# Patient Record
Sex: MALE | Race: WHITE | NOT HISPANIC OR LATINO | Employment: FULL TIME | URBAN - METROPOLITAN AREA
[De-identification: names, ages, dates, MRNs, and addresses within clinical notes are randomized per-mention and may not be internally consistent; named-entity substitution may affect disease eponyms.]

---

## 2017-07-18 ENCOUNTER — LAB VISIT (OUTPATIENT)
Dept: LAB | Facility: HOSPITAL | Age: 67
End: 2017-07-18
Attending: INTERNAL MEDICINE
Payer: COMMERCIAL

## 2017-07-18 DIAGNOSIS — I10 HYPERTENSION: Primary | ICD-10-CM

## 2017-07-18 LAB — POTASSIUM SERPL-SCNC: 3.1 MMOL/L

## 2017-07-18 PROCEDURE — 84132 ASSAY OF SERUM POTASSIUM: CPT

## 2017-07-18 PROCEDURE — 36415 COLL VENOUS BLD VENIPUNCTURE: CPT

## 2017-11-28 ENCOUNTER — LAB VISIT (OUTPATIENT)
Dept: LAB | Facility: HOSPITAL | Age: 67
End: 2017-11-28
Attending: INTERNAL MEDICINE
Payer: MEDICARE

## 2017-11-28 DIAGNOSIS — F52.21 ERECTILE DYSFUNCTION OF NONORGANIC ORIGIN: ICD-10-CM

## 2017-11-28 DIAGNOSIS — I25.118 ATHSCL HEART DISEASE OF NATIVE COR ART W OTH ANG PCTRS: ICD-10-CM

## 2017-11-28 DIAGNOSIS — I10 HYPERTENSION: Primary | ICD-10-CM

## 2017-11-28 LAB
ALBUMIN SERPL BCP-MCNC: 4 G/DL
ALP SERPL-CCNC: 83 U/L
ALT SERPL W/O P-5'-P-CCNC: 36 U/L
ANION GAP SERPL CALC-SCNC: 12 MMOL/L
AST SERPL-CCNC: 21 U/L
BILIRUB SERPL-MCNC: 0.6 MG/DL
BUN SERPL-MCNC: 17 MG/DL
CALCIUM SERPL-MCNC: 9.3 MG/DL
CHLORIDE SERPL-SCNC: 102 MMOL/L
CO2 SERPL-SCNC: 30 MMOL/L
CREAT SERPL-MCNC: 0.8 MG/DL
EST. GFR  (AFRICAN AMERICAN): >60 ML/MIN/1.73 M^2
EST. GFR  (NON AFRICAN AMERICAN): >60 ML/MIN/1.73 M^2
GLUCOSE SERPL-MCNC: 83 MG/DL
POTASSIUM SERPL-SCNC: 3 MMOL/L
PROT SERPL-MCNC: 7.1 G/DL
SODIUM SERPL-SCNC: 144 MMOL/L

## 2017-11-28 PROCEDURE — 36415 COLL VENOUS BLD VENIPUNCTURE: CPT

## 2017-11-28 PROCEDURE — 80053 COMPREHEN METABOLIC PANEL: CPT

## 2017-11-28 PROCEDURE — 84402 ASSAY OF FREE TESTOSTERONE: CPT

## 2017-11-29 ENCOUNTER — TELEPHONE (OUTPATIENT)
Dept: UROLOGY | Facility: CLINIC | Age: 67
End: 2017-11-29

## 2017-11-29 NOTE — TELEPHONE ENCOUNTER
----- Message from Lizzette Carmona sent at 11/29/2017 11:39 AM CST -----  Contact: pt  Pt states that he got a letter to come in 12/14 for his annual appointment also having kidney issues,only thing coming up in the system is 12/29.Pt would like to be seen sooner than that...[phone] (home)

## 2017-11-30 ENCOUNTER — OFFICE VISIT (OUTPATIENT)
Dept: UROLOGY | Facility: CLINIC | Age: 67
End: 2017-11-30
Payer: MEDICARE

## 2017-11-30 ENCOUNTER — HOSPITAL ENCOUNTER (OUTPATIENT)
Dept: RADIOLOGY | Facility: HOSPITAL | Age: 67
Discharge: HOME | End: 2017-11-30
Attending: INTERNAL MEDICINE
Payer: MEDICARE

## 2017-11-30 VITALS
WEIGHT: 251.13 LBS | RESPIRATION RATE: 18 BRPM | SYSTOLIC BLOOD PRESSURE: 151 MMHG | DIASTOLIC BLOOD PRESSURE: 87 MMHG | HEART RATE: 73 BPM | HEIGHT: 70 IN | BODY MASS INDEX: 35.95 KG/M2

## 2017-11-30 DIAGNOSIS — N40.0 BENIGN PROSTATIC HYPERPLASIA, UNSPECIFIED WHETHER LOWER URINARY TRACT SYMPTOMS PRESENT: ICD-10-CM

## 2017-11-30 DIAGNOSIS — Z87.442 PERSONAL HISTORY OF KIDNEY STONES: ICD-10-CM

## 2017-11-30 DIAGNOSIS — R10.9 ACUTE LEFT FLANK PAIN: Primary | ICD-10-CM

## 2017-11-30 DIAGNOSIS — R10.10 UPPER ABDOMINAL PAIN: ICD-10-CM

## 2017-11-30 DIAGNOSIS — R10.10 UPPER ABDOMINAL PAIN: Primary | ICD-10-CM

## 2017-11-30 LAB
BILIRUB SERPL-MCNC: NORMAL MG/DL
BLOOD URINE, POC: NORMAL
COLOR, POC UA: NORMAL
GLUCOSE UR QL STRIP: NORMAL
KETONES UR QL STRIP: NORMAL
LEUKOCYTE ESTERASE URINE, POC: NORMAL
NITRITE, POC UA: NORMAL
PH, POC UA: 5
POC RESIDUAL URINE VOLUME: 37 ML (ref 0–100)
PROTEIN, POC: NORMAL
SPECIFIC GRAVITY, POC UA: 1
UROBILINOGEN, POC UA: NORMAL

## 2017-11-30 PROCEDURE — 81001 URINALYSIS AUTO W/SCOPE: CPT | Mod: S$GLB,,, | Performed by: NURSE PRACTITIONER

## 2017-11-30 PROCEDURE — 51798 US URINE CAPACITY MEASURE: CPT | Mod: S$GLB,,, | Performed by: NURSE PRACTITIONER

## 2017-11-30 PROCEDURE — 74000 XR KUB: CPT | Mod: TC

## 2017-11-30 PROCEDURE — 99214 OFFICE O/P EST MOD 30 MIN: CPT | Mod: 25,S$GLB,, | Performed by: NURSE PRACTITIONER

## 2017-11-30 PROCEDURE — 99999 PR PBB SHADOW E&M-EST. PATIENT-LVL IV: CPT | Mod: PBBFAC,,, | Performed by: NURSE PRACTITIONER

## 2017-11-30 PROCEDURE — 74000 XR KUB: CPT | Mod: 26,,, | Performed by: RADIOLOGY

## 2017-11-30 NOTE — PROGRESS NOTES
[facility] Note  Staff: SU Campos    Referring provider and please cc:   PCP: Dr. Phill Balderrama    Chief Complaint: Annual exam, left flank pain    Subjective:        HPI: Sammy Muniz is a 67 y.o. male presents today for annual exam of prostate and recent onset of acute left flank pain which began three days ago.    The patient was last seen by Dr. Veronica on 12/15/16 for routine check in regards to history of BPH.  Pt underwent a laser vaporization of the prostate years ago, and in general he is doing well.  He   refers that he is urinating with a good flow and good control, have nocturia x2-x3.  Denies dysuria or hematuria.  He feels that he empties the bladder satisfactory with no hesitation or terminal dribbling, but his postvoid residual urine was measured today and was found to be at 37 mL.    The pt saw Dr. Phill Balderrama this am which ordered him a KUB and the results showed the following:  Findings: A stone is seen overlying the lower pole of the left kidney measuring 4 mm. No additional stones are seen overlying the renal shadows or along the expected course of ureters. Previously present stone in the midpole of the left kidney is not seen on this exam.  IMPRESSION:    Left nephrolithiasis.    Last PSA Screening:   Lab Results   Component Value Date    PSA 0.32 01/08/2013    PSADIAG 0.54 12/07/2016    PSADIAG 0.59 08/31/2015    PSADIAG 0.40 07/22/2014     REVIEW OF SYSTEMS:  Review of Systems   Constitutional: Negative for chills, diaphoresis, fever and weight loss.   HENT: Negative for congestion, hearing loss, nosebleeds and sore throat.    Eyes: Negative for blurred vision and pain.   Respiratory: Negative for cough and wheezing.    Cardiovascular: Negative for chest pain, palpitations and leg swelling.   Gastrointestinal: Negative for abdominal pain, heartburn, nausea and vomiting.   Genitourinary: Positive for flank pain (Left). Negative for dysuria,  frequency, hematuria and urgency.   Musculoskeletal: Negative for back pain, joint pain, myalgias and neck pain.   Skin: Negative for itching and rash.   Neurological: Negative for dizziness, tremors, sensory change, seizures, loss of consciousness, weakness and headaches.   Endo/Heme/Allergies: Does not bruise/bleed easily.   Psychiatric/Behavioral: Negative for depression and suicidal ideas. The patient is not nervous/anxious.      Physical Exam    PMHx:  Past Medical History:   Diagnosis Date    Arthritis     left knee; right shoulder    Bladder tumor     BPH (benign prostatic hyperplasia)     Hypertension     Sleep apnea     Doews not use C-pap     Kidney stones: Yes     PSHx:  Past Surgical History:   Procedure Laterality Date    CERVICAL FUSION      CYSTOSCOPY      GASTRIC BYPASS  2012    HERNIA REPAIR      ventral hernia    JOINT REPLACEMENT      Bilateral knees & Rt shoulder    PROSTATE SURGERY      greenlight laser    TONSILLECTOMY      TOTAL KNEE ARTHROPLASTY  left    TOTAL SHOULDER ARTHROPLASTY  right    TYMPANOPLASTY  left    VASECTOMY       Allergies:  Iodinated contrast- oral and iv dye    Medications: reviewed   Anticoagulation: No    Objective:     Vitals:    11/30/17 1339   BP: (!) 151/87   Pulse: 73   Resp: 18     General:WDWN in NAD  Eyes: PERRLA, normal conjunctiva  Respiratory: no increased work on breathing, clear to auscultation  Cardiovascular: regular rate and rhythm. No obvious extremity edema.  GI: palpation of masses. No tenderness. No hepatosplenomegaly to palpation.  Musculoskeletal: normal range of motion of bilateral upper extremities. Normal muscle strength and tone.  Skin: no obvious rashes or lesions. No tightening of skin noted.  Neurologic: CN grossly normal. Normal sensation.   Psychiatric: awake, alert and oriented x 3. Mood and affect normal. Cooperative.    :  Inspection of anus and perineum normal  No scrotal rashes, cysts or lesions  Epididymis normal in  size, no tenderness  Testes normal and size, equal size bilaterally, no masses, movable palpable pinpoint cysts felt.  Urethral meatus normal without discharge  Penis is circumcised,    ANDREW: Normal prostate gland without masses, tenderness. SV not palpable. Normal sphincter tone.   No bilateral inguinal hernias noted     LABS REVIEW:  UA today:  Color, UA  yellow,clear    Spec Grav UA 1.005    pH, UA 5.0    WBC, UA neg    Nitrite, UA neg    Protein trace    Glucose, UA norm    Ketones, UA neg\    Urobilinogen, UA neg    Bilirubin neg    Blood, UA neg      Assessment:       1. Acute left flank pain    2. Benign prostatic hyperplasia, unspecified whether lower urinary tract symptoms present    3. Personal history of kidney stones          Plan:     Left flank pain-due to pt's pain we will order CT RSS for further evaluation and consult Dr. Veronica if necessary for treatment if stone.    Draw PSA level today.  Continue Flomax as previously prescribed.    F/up:TBD after imaging results are reviewed.    Gita Lehman, BEVERLYP-C

## 2017-12-01 ENCOUNTER — HOSPITAL ENCOUNTER (OUTPATIENT)
Dept: RADIOLOGY | Facility: HOSPITAL | Age: 67
Discharge: HOME | End: 2017-12-01
Attending: NURSE PRACTITIONER
Payer: MEDICARE

## 2017-12-01 DIAGNOSIS — R10.9 ACUTE LEFT FLANK PAIN: ICD-10-CM

## 2017-12-01 DIAGNOSIS — N40.0 BENIGN PROSTATIC HYPERPLASIA, UNSPECIFIED WHETHER LOWER URINARY TRACT SYMPTOMS PRESENT: ICD-10-CM

## 2017-12-01 LAB — TESTOST FREE SERPL-MCNC: 5.5 PG/ML

## 2017-12-01 PROCEDURE — 74176 CT ABD & PELVIS W/O CONTRAST: CPT | Mod: TC

## 2017-12-01 PROCEDURE — 74176 CT ABD & PELVIS W/O CONTRAST: CPT | Mod: 26,,, | Performed by: RADIOLOGY

## 2018-01-17 ENCOUNTER — PATIENT MESSAGE (OUTPATIENT)
Dept: UROLOGY | Facility: CLINIC | Age: 68
End: 2018-01-17

## 2018-01-18 ENCOUNTER — HOSPITAL ENCOUNTER (OUTPATIENT)
Dept: RADIOLOGY | Facility: HOSPITAL | Age: 68
Discharge: HOME | End: 2018-01-18
Attending: NURSE PRACTITIONER
Payer: MEDICARE

## 2018-01-18 ENCOUNTER — OFFICE VISIT (OUTPATIENT)
Dept: UROLOGY | Facility: CLINIC | Age: 68
End: 2018-01-18
Payer: MEDICARE

## 2018-01-18 ENCOUNTER — DOCUMENTATION ONLY (OUTPATIENT)
Dept: UROLOGY | Facility: CLINIC | Age: 68
End: 2018-01-18

## 2018-01-18 VITALS
HEIGHT: 70 IN | HEART RATE: 77 BPM | SYSTOLIC BLOOD PRESSURE: 153 MMHG | WEIGHT: 251.13 LBS | DIASTOLIC BLOOD PRESSURE: 86 MMHG | RESPIRATION RATE: 18 BRPM | BODY MASS INDEX: 35.95 KG/M2

## 2018-01-18 DIAGNOSIS — R10.9 FLANK PAIN: ICD-10-CM

## 2018-01-18 DIAGNOSIS — N20.0 NEPHROLITHIASIS: ICD-10-CM

## 2018-01-18 DIAGNOSIS — R10.9 FLANK PAIN: Primary | ICD-10-CM

## 2018-01-18 LAB
BILIRUB SERPL-MCNC: ABNORMAL MG/DL
BLOOD URINE, POC: ABNORMAL
COLOR, POC UA: CLEAR
GLUCOSE UR QL STRIP: ABNORMAL
KETONES UR QL STRIP: ABNORMAL
LEUKOCYTE ESTERASE URINE, POC: ABNORMAL
NITRITE, POC UA: ABNORMAL
PH, POC UA: 5
PROTEIN, POC: 30
SPECIFIC GRAVITY, POC UA: 1.01
UROBILINOGEN, POC UA: ABNORMAL

## 2018-01-18 PROCEDURE — 99214 OFFICE O/P EST MOD 30 MIN: CPT | Mod: 25,S$GLB,, | Performed by: NURSE PRACTITIONER

## 2018-01-18 PROCEDURE — 81001 URINALYSIS AUTO W/SCOPE: CPT | Mod: S$GLB,,, | Performed by: NURSE PRACTITIONER

## 2018-01-18 PROCEDURE — 74018 RADEX ABDOMEN 1 VIEW: CPT | Mod: 26,,, | Performed by: RADIOLOGY

## 2018-01-18 PROCEDURE — 74018 RADEX ABDOMEN 1 VIEW: CPT | Mod: TC,FY

## 2018-01-18 PROCEDURE — 76770 US EXAM ABDO BACK WALL COMP: CPT | Mod: 26,,, | Performed by: RADIOLOGY

## 2018-01-18 PROCEDURE — 99999 PR PBB SHADOW E&M-EST. PATIENT-LVL V: CPT | Mod: PBBFAC,,, | Performed by: NURSE PRACTITIONER

## 2018-01-18 PROCEDURE — 76770 US EXAM ABDO BACK WALL COMP: CPT | Mod: TC

## 2018-01-18 RX ORDER — SULFAMETHOXAZOLE AND TRIMETHOPRIM 800; 160 MG/1; MG/1
1 TABLET ORAL 2 TIMES DAILY
Qty: 42 TABLET | Refills: 0 | Status: SHIPPED | OUTPATIENT
Start: 2018-01-18 | End: 2018-02-08

## 2018-01-18 RX ORDER — OXYCODONE AND ACETAMINOPHEN 5; 325 MG/1; MG/1
1 TABLET ORAL
Qty: 30 TABLET | Refills: 0 | Status: SHIPPED | OUTPATIENT
Start: 2018-01-18 | End: 2018-01-28

## 2018-01-18 NOTE — PROGRESS NOTES
I HAVE PERSONALLY CALLED THIS PATIENT MYSELF THIS EVENING AND REVIEWED RESULTS OF KUB XR AND RENAL US WITH HIM TODAY.  FROM A UROLOGICAL STANDPOINT I EXPLAINED TO PT THAT I AM UNSURE AT THIS TIME AFTER SEEING RESULTS IF HIS CURRENT PAIN IS UROLOGY RELATED.    AT THIS TIME, I AM SENDING IN A PRESCRIPTION FOR BACTRIM -160 1 P.O. BID X 3 WEEKS TO BEGIN TONIGHT TO RULE OUT ANY FORM OF PROSTATE/TESTICLE INFECTION RELATED TO SYMPTOMS OF PAIN GOING DOWN INTO LEFT TESTICLE AREA.  IF PATIENT SYMPTOMS ARE UNCHANGED AFTER ANTIBIOTICS I WILL SCHEDULE HIM TO SEE DR. HERRERA FOR OPINION AND MAY ORDER CT UROGRAM.  I ALSO EXPLAINED TO PT TODAY TO GO AHEAD AND CONSULT WITH GENERAL SURGEON FOR FURTHER EVALUATION OF UMBILICAL HERNIA  WHICH CONTAINS BOWEL FROM PREVIOUS CT SCAN.  PT VERBALIZED UNDERSTANDING.      WE WILL FOLLOW-UP ACCORDINGLY.    (IF NEEDED IN FUTURE, PT DID STATE ALLERGY CONTRAST IN PAST CONSISTED OF MILD ITCHING AFTER PROCEDURE)

## 2018-01-18 NOTE — PROGRESS NOTES
[facility] Progress Note  Staff: SU Campos    Chief Complaint:   Chief Complaint   Patient presents with    Follow-up    Flank Pain    Hx of kidney stones      HPI: Sammy Muniz is a 67 y.o. male here with complaints of left sided flank pain which he describes as intermittently sharp and sometimes radiates around to lower left stomach region and into left testicle.  This pain began one week ago and he is here today for further evaluation of symptoms.  Pt states today the pain varies--today it is localized in left back area.    Pt denies dysuria, hematuria, or problems with urinary habits.  No recent fever, nausea or vomiting.    The patient was last seen by me on 11/30/2017 for his annual exam with hx of BPH and kidney stones.  At last visit he was c/o left flank pain and CT RSS was performed:    CT Renal Stone Study:  IMPRESSION:  1. Nonobstructive left nephrolithiasis.  2. Hepatic steatosis.  3. Status post gastric sleeve.  4. Umbilical hernia containing a loop of small bowel without associated complication.    PSA tests:  12/01/2017:  0.37  12/07/2016:  0.54  08/31/2015   0.54    Relevant Labs:   UA today:    Color:  Clear, Yellow  Spec. Grav. 1.015  PH  5.0  +Protein    Review of Systems   Constitutional: Negative for chills, diaphoresis, fever and weight loss.   HENT: Negative for congestion, hearing loss, nosebleeds and sore throat.    Eyes: Negative for blurred vision and pain.   Respiratory: Negative for cough and wheezing.    Cardiovascular: Negative for chest pain, palpitations and leg swelling.   Gastrointestinal: Positive for abdominal pain. Negative for heartburn, nausea and vomiting.        Hx of gastric sleeve.  +Umbilical hernia containing loop of small bowel   Genitourinary: Positive for flank pain (Left sided flank pain radiating to left abdominal area.). Negative for dysuria, frequency, hematuria and urgency.   Musculoskeletal: Negative for back pain,  joint pain, myalgias and neck pain.   Skin: Negative for itching and rash.   Neurological: Negative for dizziness, tremors, sensory change, seizures, loss of consciousness, weakness and headaches.   Endo/Heme/Allergies: Does not bruise/bleed easily.   Psychiatric/Behavioral: Negative for depression and suicidal ideas. The patient is not nervous/anxious.      Physical Exam   Vitals reviewed.  Constitutional: He is oriented to person, place, and time. He appears well-developed and well-nourished.   HENT:   Head: Normocephalic and atraumatic.   Right Ear: External ear normal.   Left Ear: External ear normal.   Nose: Nose normal.   Mouth/Throat: Oropharynx is clear and moist.   Eyes: Conjunctivae and EOM are normal. Pupils are equal, round, and reactive to light.   Neck: Normal range of motion. Neck supple.   Cardiovascular: Normal rate, regular rhythm, normal heart sounds and intact distal pulses.    Pulmonary/Chest: Effort normal and breath sounds normal.   Abdominal: Soft. Bowel sounds are normal.   No CVA tenderness on palpation on exam.   Musculoskeletal: Normal range of motion.   Neurological: He is alert and oriented to person, place, and time. He has normal reflexes.   Skin: Skin is warm and dry.     Psychiatric: He has a normal mood and affect. His behavior is normal. Judgment and thought content normal.     A) Sammy Muniz is a 67 y.o. male with   1. Flank pain    2. Nephrolithiasis      Plan)   1. KUB and Renal US today.  2. In the meantime, I will prescribe pt Percocet 5-325 mg prn pain.    F/up after reviewing imaging results.    I have spent 30 minutes with this patient and over 50% of visit was spent counseling with the patient.    Gita BLUE

## 2018-01-18 NOTE — PATIENT INSTRUCTIONS
Uncertain Causes of Abdominal Pain (Male)  Based on your visit today, the exact cause of your abdominal pain is not clear. Your exam and tests do not suggest a dangerous cause at this time. However, the signs of a serious problem may take more time to appear. Although your evaluation was reassuring today, sometimes early in the course of many conditions, exam and lab tests can appear normal. Therefore, it is important for you to watch for any new symptoms or worsening of your condition.  It may not be obvious what caused your symptoms. Pay attention to things that do seem to make your symptoms worse or better and discuss this with your doctor when you follow up.  The evaluation of abdominal pain in the emergency department may only require an exam by the doctor or it may include blood, urine or imaging studies, depending on many factors. Sometimes exams and tests can identify a cause but in many cases, a clear cause is not found. Further testing at follow up visits may help to suggest a clear diagnosis.  Home care  · Rest as much as you can until your next exam.  · Try to avoid any medicines (unless otherwise directed by your doctor), foods, activities, or other factors that may have contributed to your symptoms.  · Try to eat foods that you know that you have tolerated well in the past. Certain diets may be recommended for some conditions that cause abdominal pain. However, since the cause of your symptoms may not be clear, discuss your diet more with your healthcare provider or specialist for further recommendations.   · If you have diarrhea, it may help to avoid dairy (lactose) for the time being. A low fat, low fiber diet can also help.  · Eating several small meals per day as opposed to 2 or 3 larger meals may help.  · Avoid dehydration. Make sure to drink plenty of water. Other options include broth, soup, gelatin, sports drinks, or other clear liquids.  · Watch closely for anything that may make your  symptoms worse or better. Pay close attention to symptoms below that may mean your condition is getting worse.  Follow-up care  Follow up with your healthcare provider if your symptoms are not improving, or as advised. In some cases, you may need more testing.  When to seek medical advice  Call your healthcare provider right away if any of these occur:  · Pain is becoming worse  · You are unable to take your medicines or can't keep water down due to excessive vomiting  · Swelling of the abdomen  · Fever of 100.4ºF (38ºC) or higher, or as directed by your healthcare provider  · Blood in vomit or bowel movements (dark red or black color)  · Jaundice (yellow color of eyes and skin)  · New onset of weakness, dizziness or fainting  · New onset of chest, arm, back, neck or jaw pain  Date Last Reviewed: 12/30/2015  © 0390-5993 [employer]. [address] [place] [address]. All rights reserved. This information is not intended as a substitute for professional medical care. Always follow your healthcare professional's instructions.        Flank Pain, Uncertain Cause  The flank is the area between your upper abdomen and your back. Pain there is often caused by a problem with your kidneys. It might be a kidney infection or a kidney stone. Other causes of flank pain include spinal arthritis, a pinched nerve from a back injury, or a back muscle strain or spasm.  The cause of your flank pain is not certain. You may need other tests.  Home care  Follow these tips when caring for yourself at home:  · You may use acetaminophen or ibuprofen to control pain, unless your health care provider prescribed another medicine. If you have chronic liver or kidney disease, talk with your provider before taking these medicines. Also talk with your provider first if youve ever had a stomach ulcer or GI bleeding.  · If the pain is coming from your muscles, you may get relief with ice or heat. During the first 2 days after  the injury, put an ice pack on the painful area for 20 minutes every 2 to 4 hours. This will reduce swelling and pain. A hot shower, hot bath, or heating pad works well for a muscle spasm. You can start with ice, then switch to heat after 2 days. You might find that alternating ice and heat works well. Use the method that feels the best to you.  Follow-up care  Follow up with your healthcare provider if your symptoms dont get better over the next few days.  When to seek medical advice  Call your healthcare provider right away if any of these happen:  · Repeated vomiting  · Fever of 100.4ºF (38ºC) or higher, or as directed by your health care provider  · Flank pain that gets worse  · Pain that spreads to the front of your belly (abdomen)  · Dizziness, weakness, or fainting  · Blood in your urine  · Burning feeling when you urinate or the need to urinate often  · Pain in one of your legs that gets worse  · Numbness or weakness in a leg  Date Last Reviewed: 10/1/2016  © 9676-3510 [employer]. [address] [place] [address], [place] [address]. All rights reserved. This information is not intended as a substitute for professional medical care. Always follow your healthcare professional's instructions.

## 2018-01-20 ENCOUNTER — HOSPITAL ENCOUNTER (EMERGENCY)
Facility: HOSPITAL | Age: 68
Discharge: HOME | End: 2018-01-20
Attending: EMERGENCY MEDICINE
Payer: MEDICARE

## 2018-01-20 VITALS
RESPIRATION RATE: 16 BRPM | TEMPERATURE: 98 F | SYSTOLIC BLOOD PRESSURE: 136 MMHG | DIASTOLIC BLOOD PRESSURE: 71 MMHG | BODY MASS INDEX: 36.01 KG/M2 | OXYGEN SATURATION: 98 % | HEART RATE: 77 BPM | WEIGHT: 251 LBS

## 2018-01-20 DIAGNOSIS — R10.9 RIGHT FLANK PAIN: Primary | ICD-10-CM

## 2018-01-20 LAB
ALBUMIN SERPL BCP-MCNC: 4.1 G/DL
ALP SERPL-CCNC: 81 U/L
ALT SERPL W/O P-5'-P-CCNC: 32 U/L
ANION GAP SERPL CALC-SCNC: 8 MMOL/L
AST SERPL-CCNC: 21 U/L
BACTERIA #/AREA URNS HPF: ABNORMAL /HPF
BASOPHILS # BLD AUTO: 0 K/UL
BASOPHILS NFR BLD: 0.5 %
BILIRUB SERPL-MCNC: 0.5 MG/DL
BILIRUB UR QL STRIP: ABNORMAL
BUN SERPL-MCNC: 12 MG/DL
CALCIUM SERPL-MCNC: 9.4 MG/DL
CHLORIDE SERPL-SCNC: 105 MMOL/L
CLARITY UR: CLEAR
CO2 SERPL-SCNC: 26 MMOL/L
COLOR UR: YELLOW
CREAT SERPL-MCNC: 0.8 MG/DL
DIFFERENTIAL METHOD: ABNORMAL
EOSINOPHIL # BLD AUTO: 0.1 K/UL
EOSINOPHIL NFR BLD: 1.5 %
ERYTHROCYTE [DISTWIDTH] IN BLOOD BY AUTOMATED COUNT: 12.9 %
EST. GFR  (AFRICAN AMERICAN): >60 ML/MIN/1.73 M^2
EST. GFR  (NON AFRICAN AMERICAN): >60 ML/MIN/1.73 M^2
GLUCOSE SERPL-MCNC: 143 MG/DL
GLUCOSE UR QL STRIP: NEGATIVE
HCT VFR BLD AUTO: 40.9 %
HGB BLD-MCNC: 14.3 G/DL
HGB UR QL STRIP: NEGATIVE
HYALINE CASTS #/AREA URNS LPF: 0 /LPF
KETONES UR QL STRIP: ABNORMAL
LEUKOCYTE ESTERASE UR QL STRIP: NEGATIVE
LYMPHOCYTES # BLD AUTO: 1.4 K/UL
LYMPHOCYTES NFR BLD: 24.4 %
MCH RBC QN AUTO: 31.3 PG
MCHC RBC AUTO-ENTMCNC: 34.9 G/DL
MCV RBC AUTO: 90 FL
MICROSCOPIC COMMENT: ABNORMAL
MONOCYTES # BLD AUTO: 0.3 K/UL
MONOCYTES NFR BLD: 4.7 %
NEUTROPHILS # BLD AUTO: 3.8 K/UL
NEUTROPHILS NFR BLD: 68.9 %
NITRITE UR QL STRIP: NEGATIVE
PH UR STRIP: 7 [PH] (ref 5–8)
PLATELET # BLD AUTO: 253 K/UL
PMV BLD AUTO: 7.4 FL
POTASSIUM SERPL-SCNC: 3.4 MMOL/L
PROT SERPL-MCNC: 7.1 G/DL
PROT UR QL STRIP: ABNORMAL
RBC # BLD AUTO: 4.56 M/UL
RBC #/AREA URNS HPF: 2 /HPF (ref 0–4)
SODIUM SERPL-SCNC: 139 MMOL/L
SP GR UR STRIP: 1.02 (ref 1–1.03)
URN SPEC COLLECT METH UR: ABNORMAL
UROBILINOGEN UR STRIP-ACNC: 1 EU/DL
WBC # BLD AUTO: 5.5 K/UL
WBC #/AREA URNS HPF: 3 /HPF (ref 0–5)

## 2018-01-20 PROCEDURE — 85025 COMPLETE CBC W/AUTO DIFF WBC: CPT

## 2018-01-20 PROCEDURE — 99284 EMERGENCY DEPT VISIT MOD MDM: CPT

## 2018-01-20 PROCEDURE — 80053 COMPREHEN METABOLIC PANEL: CPT

## 2018-01-20 PROCEDURE — 36415 COLL VENOUS BLD VENIPUNCTURE: CPT

## 2018-01-20 PROCEDURE — 81000 URINALYSIS NONAUTO W/SCOPE: CPT

## 2018-01-20 RX ORDER — ONDANSETRON 2 MG/ML
INJECTION INTRAMUSCULAR; INTRAVENOUS
Status: DISCONTINUED
Start: 2018-01-20 | End: 2018-01-20 | Stop reason: WASHOUT

## 2018-01-20 NOTE — ED PROVIDER NOTES
Encounter Date: 1/20/2018    SCRIBE #1 NOTE: I, Anthony Lopez, am scribing for, and in the presence of, Dr. Leong.       History     Chief Complaint   Patient presents with    Flank Pain     left. for almost two weeks. has been seeing Jeremías's NP. states labs, US and xrays. reports taking antibiotics for infection. pt thinks he was told he has a mass or cyst on kidney       01/20/2018 11:24 AM     Chief complaint:       Sammy Muniz is a 67 y.o. male with a history of HTN, Dm, CAD, and COPD who presents to the ED with an onset of left flank pain. The symptoms began about 2 weeks ago and have worsened. The patient states that the pain has begun to radiate down his left side. He adds that he has a history of kidney stones. An ultrasound last week showed that he has a cyst on top of his left kidney but no stone. He is currently taking Flomax.The patient denies any fever, coughing, or usage of Toradol to assist with the passing of a potential stone. No pertinent Shx noted.  No testicle pain or swelling.  No abdominal pain at this time.  No fevers or chills or vomiting.        The history is provided by the patient.     Review of patient's allergies indicates:   Allergen Reactions    Iodinated contrast- oral and iv dye Itching     Past Medical History:   Diagnosis Date    Arthritis     left knee; right shoulder    Bladder tumor     BPH (benign prostatic hyperplasia)     Hypertension     Sleep apnea     Doews not use C-pap     Past Surgical History:   Procedure Laterality Date    CERVICAL FUSION      CYSTOSCOPY      GASTRIC BYPASS  2012    HERNIA REPAIR      ventral hernia    JOINT REPLACEMENT      Bilateral knees & Rt shoulder    PROSTATE SURGERY      greenlight laser    TONSILLECTOMY      TOTAL KNEE ARTHROPLASTY  left    TOTAL SHOULDER ARTHROPLASTY  right    TYMPANOPLASTY  left    VASECTOMY       Family History   Problem Relation Age of Onset    Benign prostatic hyperplasia Brother     Heart  disease Mother     Kidney disease Mother     Arthritis Mother     Mental illness Father 84     dementia    Diabetes Father     Hypertension Father     Heart disease Father     Cancer Father 75     colon    Prostate cancer Neg Hx     Urolithiasis Neg Hx     Kidney cancer Neg Hx      Social History   Substance Use Topics    Smoking status: Never Smoker    Smokeless tobacco: Never Used    Alcohol use No     Review of Systems   Constitutional: Negative for fever.   Respiratory: Negative for cough and wheezing.    Cardiovascular: Negative for chest pain.   Gastrointestinal: Negative for abdominal pain.   Genitourinary: Positive for flank pain (left flank pain). Negative for dysuria.   Musculoskeletal: Negative for joint swelling.   Skin: Negative for rash.   All other systems reviewed and are negative.      Physical Exam     Initial Vitals [01/20/18 1102]   BP Pulse Resp Temp SpO2   (!) 146/93 77 16 97.6 °F (36.4 °C) 98 %      MAP       110.67         Physical Exam    Nursing note and vitals reviewed.  Constitutional: He appears well-developed and well-nourished. He is not diaphoretic.  Non-toxic appearance. He does not have a sickly appearance. He does not appear ill. No distress.   HENT:   Head: Normocephalic and atraumatic.   Eyes: EOM are normal.   Neck: Normal range of motion. Neck supple. Normal range of motion present. No neck rigidity.   Cardiovascular: Normal rate, regular rhythm and normal heart sounds. Exam reveals no gallop and no friction rub.    No murmur heard.  Pulmonary/Chest: Breath sounds normal. No respiratory distress. He has no wheezes. He has no rhonchi. He has no rales.   Abdominal: Soft. He exhibits no distension. There is no tenderness. There is no rebound and no guarding.   No abdominal tenderness   Musculoskeletal: Normal range of motion.   Neurological: He is alert and oriented to person, place, and time.   Skin: Skin is warm and dry. No rash noted.   Patient has no rash to the  left flank   Psychiatric: He has a normal mood and affect. His behavior is normal. Judgment and thought content normal.         ED Course   Procedures  Labs Reviewed   CBC W/ AUTO DIFFERENTIAL - Abnormal; Notable for the following:        Result Value    RBC 4.56 (*)     MCH 31.3 (*)     MPV 7.4 (*)     All other components within normal limits   COMPREHENSIVE METABOLIC PANEL   URINALYSIS             Medical Decision Making:   History:   Old Medical Records: I decided to obtain old medical records.  Clinical Tests:   Lab Tests: Ordered and Reviewed  Radiological Study: Ordered and Reviewed            Scribe Attestation:   Scribe #1: I performed the above scribed service and the documentation accurately describes the services I performed. I attest to the accuracy of the note.    I, Dr. Leong, personally performed the services described in this documentation. All medical record entries made by the scribe were at my direction and in my presence.  I have reviewed the chart and agree that the record reflects my personal performance and is accurate and complete.1:40 PM 01/20/2018            ED Course as of Jan 20 1339   Sat Jan 20, 2018   1333 67-year-old male presents to the ER with intermittent left flank pain radiating to the groin.  Patient has seen urology for this.  Workup today is unremarkable.  Patient states that the pain changes with movement making musculoskeletal cause more likely.  A urine culture has been sent.  The patient is comfortable and does not desire any pain medicine.  I do not suspect renal artery dissection I see no reason for a CT with contrast.  [EF]      ED Course User Index  [EF] Vladislav Leong MD     Clinical Impression:   No diagnosis found.                             Vladislav Leong MD  01/20/18 1343

## 2018-01-21 ENCOUNTER — PATIENT MESSAGE (OUTPATIENT)
Dept: UROLOGY | Facility: CLINIC | Age: 68
End: 2018-01-21

## 2018-04-12 ENCOUNTER — HOSPITAL ENCOUNTER (OUTPATIENT)
Dept: RADIOLOGY | Facility: HOSPITAL | Age: 68
Discharge: HOME | End: 2018-04-12
Attending: INTERNAL MEDICINE
Payer: MEDICARE

## 2018-04-12 DIAGNOSIS — J20.8 VIRAL BRONCHITIS: ICD-10-CM

## 2018-04-12 DIAGNOSIS — J20.8 VIRAL BRONCHITIS: Primary | ICD-10-CM

## 2018-04-12 PROCEDURE — 71046 X-RAY EXAM CHEST 2 VIEWS: CPT | Mod: TC,FY

## 2018-04-12 PROCEDURE — 71046 X-RAY EXAM CHEST 2 VIEWS: CPT | Mod: 26,,, | Performed by: RADIOLOGY

## 2019-01-25 ENCOUNTER — OFFICE VISIT (OUTPATIENT)
Dept: UROLOGY | Facility: CLINIC | Age: 69
End: 2019-01-25
Payer: MEDICARE

## 2019-01-25 ENCOUNTER — HOSPITAL ENCOUNTER (OUTPATIENT)
Dept: RADIOLOGY | Facility: HOSPITAL | Age: 69
Discharge: HOME | End: 2019-01-25
Attending: NURSE PRACTITIONER
Payer: MEDICARE

## 2019-01-25 ENCOUNTER — TELEPHONE (OUTPATIENT)
Dept: UROLOGY | Facility: CLINIC | Age: 69
End: 2019-01-25

## 2019-01-25 VITALS
BODY MASS INDEX: 36.83 KG/M2 | HEART RATE: 70 BPM | DIASTOLIC BLOOD PRESSURE: 72 MMHG | WEIGHT: 257.25 LBS | HEIGHT: 70 IN | SYSTOLIC BLOOD PRESSURE: 122 MMHG | RESPIRATION RATE: 18 BRPM | TEMPERATURE: 98 F

## 2019-01-25 DIAGNOSIS — N23 KIDNEY PAIN: ICD-10-CM

## 2019-01-25 DIAGNOSIS — R10.9 ACUTE LEFT FLANK PAIN: ICD-10-CM

## 2019-01-25 DIAGNOSIS — N23 KIDNEY PAIN: Primary | ICD-10-CM

## 2019-01-25 DIAGNOSIS — R10.9 LEFT FLANK PAIN: Primary | ICD-10-CM

## 2019-01-25 LAB
BILIRUB SERPL-MCNC: ABNORMAL MG/DL
BLOOD URINE, POC: ABNORMAL
COLOR, POC UA: YELLOW
GLUCOSE UR QL STRIP: ABNORMAL
KETONES UR QL STRIP: 15
LEUKOCYTE ESTERASE URINE, POC: ABNORMAL
NITRITE, POC UA: ABNORMAL
PH, POC UA: 5
PROTEIN, POC: ABNORMAL
SPECIFIC GRAVITY, POC UA: 1.01
UROBILINOGEN, POC UA: 0.2

## 2019-01-25 PROCEDURE — 81002 POCT URINE DIPSTICK WITHOUT MICROSCOPE: ICD-10-PCS | Mod: S$GLB,,, | Performed by: NURSE PRACTITIONER

## 2019-01-25 PROCEDURE — 99214 OFFICE O/P EST MOD 30 MIN: CPT | Mod: 25,S$GLB,, | Performed by: NURSE PRACTITIONER

## 2019-01-25 PROCEDURE — 99214 PR OFFICE/OUTPT VISIT, EST, LEVL IV, 30-39 MIN: ICD-10-PCS | Mod: 25,S$GLB,, | Performed by: NURSE PRACTITIONER

## 2019-01-25 PROCEDURE — 3074F SYST BP LT 130 MM HG: CPT | Mod: CPTII,S$GLB,, | Performed by: NURSE PRACTITIONER

## 2019-01-25 PROCEDURE — 3078F DIAST BP <80 MM HG: CPT | Mod: CPTII,S$GLB,, | Performed by: NURSE PRACTITIONER

## 2019-01-25 PROCEDURE — 99999 PR PBB SHADOW E&M-EST. PATIENT-LVL V: ICD-10-PCS | Mod: PBBFAC,,, | Performed by: NURSE PRACTITIONER

## 2019-01-25 PROCEDURE — 74018 XR ABDOMEN AP 1 VIEW: ICD-10-PCS | Mod: 26,,, | Performed by: RADIOLOGY

## 2019-01-25 PROCEDURE — 74018 RADEX ABDOMEN 1 VIEW: CPT | Mod: TC,FY

## 2019-01-25 PROCEDURE — 81002 URINALYSIS NONAUTO W/O SCOPE: CPT | Mod: S$GLB,,, | Performed by: NURSE PRACTITIONER

## 2019-01-25 PROCEDURE — 99999 PR PBB SHADOW E&M-EST. PATIENT-LVL V: CPT | Mod: PBBFAC,,, | Performed by: NURSE PRACTITIONER

## 2019-01-25 PROCEDURE — 74018 RADEX ABDOMEN 1 VIEW: CPT | Mod: 26,,, | Performed by: RADIOLOGY

## 2019-01-25 PROCEDURE — 3078F PR MOST RECENT DIASTOLIC BLOOD PRESSURE < 80 MM HG: ICD-10-PCS | Mod: CPTII,S$GLB,, | Performed by: NURSE PRACTITIONER

## 2019-01-25 PROCEDURE — 3074F PR MOST RECENT SYSTOLIC BLOOD PRESSURE < 130 MM HG: ICD-10-PCS | Mod: CPTII,S$GLB,, | Performed by: NURSE PRACTITIONER

## 2019-01-25 RX ORDER — PREDNISONE 50 MG/1
50 TABLET ORAL DAILY
Qty: 3 TABLET | Refills: 0 | Status: SHIPPED | OUTPATIENT
Start: 2019-01-25 | End: 2019-01-28

## 2019-01-25 RX ORDER — CELECOXIB 100 MG/1
100 CAPSULE ORAL DAILY
COMMUNITY
Start: 2018-12-27 | End: 2021-03-23 | Stop reason: SDUPTHER

## 2019-01-25 RX ORDER — SPIRONOLACTONE 100 MG/1
100 TABLET, FILM COATED ORAL DAILY
COMMUNITY
Start: 2018-12-21 | End: 2021-03-11 | Stop reason: DRUGHIGH

## 2019-01-25 NOTE — PATIENT INSTRUCTIONS
Flank Pain, Uncertain Cause  The flank is the area between your upper abdomen and your back. Pain there is often caused by a problem with your kidneys. It might be a kidney infection or a kidney stone. Other causes of flank pain include spinal arthritis, a pinched nerve from a back injury, or a back muscle strain or spasm.  The cause of your flank pain is not certain. You may need other tests.  Home care  Follow these tips when caring for yourself at home:  · You may use acetaminophen or ibuprofen to control pain, unless your health care provider prescribed another medicine. If you have chronic liver or kidney disease, talk with your provider before taking these medicines. Also talk with your provider first if youve ever had a stomach ulcer or GI bleeding.  · If the pain is coming from your muscles, you may get relief with ice or heat. During the first 2 days after the injury, put an ice pack on the painful area for 20 minutes every 2 to 4 hours. This will reduce swelling and pain. A hot shower, hot bath, or heating pad works well for a muscle spasm. You can start with ice, then switch to heat after 2 days. You might find that alternating ice and heat works well. Use the method that feels the best to you.  Follow-up care  Follow up with your healthcare provider if your symptoms dont get better over the next few days.  When to seek medical advice  Call your healthcare provider right away if any of these happen:  · Repeated vomiting  · Fever of 100.4ºF (38ºC) or higher, or as directed by your health care provider  · Flank pain that gets worse  · Pain that spreads to the front of your belly (abdomen)  · Dizziness, weakness, or fainting  · Blood in your urine  · Burning feeling when you urinate or the need to urinate often  · Pain in one of your legs that gets worse  · Numbness or weakness in a leg  Date Last Reviewed: 10/1/2016  © 7255-8229 [employer]. [address] [place] [address], [place] [address]. All  rights reserved. This information is not intended as a substitute for professional medical care. Always follow your healthcare professional's instructions.

## 2019-01-25 NOTE — TELEPHONE ENCOUNTER
Called patient to give result result of XR and there was no answer so left voice message for call back. Will attempt to call again later.

## 2019-01-25 NOTE — PROGRESS NOTES
[facility] Note  Staff: AMY Campos-C    PCP: Dr. Phill Balderrama    Chief Complaint: Kidney pain, Flank pain    Subjective:        HPI: Sammy Muniz is a 68 y.o. male presents with Left sided flank pain which began 3-4 months ago and has progressively worsened intermittently.  Pt describes pain as aching, deep and dull pains.  He currently denies hematuria, dysuria, but does admit to some recent increased urinary frequency in symptoms.    Last office visit with me was on 01/18/2018 with complaints of similar symptoms.  KUB, Renal US, CT RSS was all performed and from Urology standpoint everything showed normal findings at that time except for 2mm stone sitting within lower pole of left kidney.    The pt was seen by Dr. Veronica in the past for symptomatic BPH.    Last PSA Screening:   Lab Results   Component Value Date    PSA 0.32 01/08/2013    PSADIAG 0.37 12/01/2017    PSADIAG 0.54 12/07/2016    PSADIAG 0.59 08/31/2015     REVIEW OF SYSTEMS:  A comprehensive 10 system review was performed and is negative except as noted above in HPI    PMHx:  Past Medical History:   Diagnosis Date    Arthritis     left knee; right shoulder    Bladder tumor     BPH (benign prostatic hyperplasia)     Hypertension     Sleep apnea     Doews not use C-pap     PSHx:  Past Surgical History:   Procedure Laterality Date    CERVICAL FUSION      CYSTOSCOPY      CYSTOSCOPY WITH RETROGRADE PYELOGRAM Right 9/8/2015    [facility]    EXTRACTION-STONE-URETEROSCOPY Right 9/8/2015    [facility]    GASTRIC BYPASS  2012    HERNIA REPAIR      ventral hernia    JOINT REPLACEMENT      Bilateral knees & Rt shoulder    LITHOTRIPSY-EXTRACORPOREAL SHOCK WAVE Left 11/10/2015    [facility] OR    PROSTATE SURGERY      greenlight laser    RESECTION-MASS  2/7/2012    [facility]     TONSILLECTOMY      TOTAL KNEE ARTHROPLASTY  left    TOTAL SHOULDER ARTHROPLASTY  right    TURP, USING GREEN LIGHT LASER N/A 2/7/2012    [facility]    TYMPANOPLASTY  left    VASECTOMY       Allergies:  Iodinated contrast- oral and iv dye    Medications: reviewed   Anticoagulation: No    Objective:     Vitals:    01/25/19 1302   BP: 122/72   Pulse: 70   Resp: 18   Temp: 98 °F (36.7 °C)     General:WDWN in NAD  Eyes: PERRLA, normal conjunctiva  Respiratory: no increased work on breathing, clear to auscultation  Cardiovascular: regular rate and rhythm. No obvious extremity edema.  GI: palpation of masses. No tenderness. No hepatosplenomegaly to palpation.  Musculoskeletal: normal range of motion of bilateral upper extremities. Normal muscle strength and tone.  Skin: no obvious rashes or lesions. No tightening of skin noted.  Neurologic: CN grossly normal. Normal sensation.   Psychiatric: awake, alert and oriented x 3. Mood and affect normal. Cooperative.    LABS REVIEW:  UA today: Color:Clear, Yellow  Spec. Grav.  1.015  PH  5.0  15 Ketones  Small amt bilirubin  Cr:   Lab Results   Component Value Date    CREATININE 0.8 01/20/2018     Assessment:       1. Kidney pain    2. Acute left flank pain          Plan:   Chronic left sided flank/kidney pain vs. Other unknown pain origin:    KUB XR to be performed today.    F/u TBD determining the KUB results, may get CT Urogram with and without contrast in future.    [facility]: Active    Gita Lehman, SU

## 2019-01-28 ENCOUNTER — TELEPHONE (OUTPATIENT)
Dept: UROLOGY | Facility: CLINIC | Age: 69
End: 2019-01-28

## 2019-01-28 NOTE — TELEPHONE ENCOUNTER
Returned patient's phone call to give xray result and recommendation for further imaging. Patient voiced understanding and and scheduled for CT Urogram 1/29.

## 2019-01-28 NOTE — TELEPHONE ENCOUNTER
----- Message from Lyssa Machado sent at 1/28/2019  8:07 AM CST -----  Pt requesting results / returning call from Friday ... He is stating still in a lot of pain / call [phone] (home)

## 2019-01-29 ENCOUNTER — HOSPITAL ENCOUNTER (OUTPATIENT)
Dept: RADIOLOGY | Facility: HOSPITAL | Age: 69
Discharge: HOME | End: 2019-01-29
Attending: NURSE PRACTITIONER
Payer: MEDICARE

## 2019-01-29 ENCOUNTER — TELEPHONE (OUTPATIENT)
Dept: UROLOGY | Facility: CLINIC | Age: 69
End: 2019-01-29

## 2019-01-29 DIAGNOSIS — R10.9 LEFT FLANK PAIN: ICD-10-CM

## 2019-01-29 PROCEDURE — 25500020 PHARM REV CODE 255

## 2019-01-29 PROCEDURE — 74178 CT ABD&PLV WO CNTR FLWD CNTR: CPT | Mod: 26,,, | Performed by: RADIOLOGY

## 2019-01-29 PROCEDURE — 74178 CT UROGRAM ABD PELVIS W WO: ICD-10-PCS | Mod: 26,,, | Performed by: RADIOLOGY

## 2019-01-29 PROCEDURE — 74178 CT ABD&PLV WO CNTR FLWD CNTR: CPT | Mod: TC

## 2019-01-29 RX ADMIN — IOHEXOL 125 ML: 350 INJECTION, SOLUTION INTRAVENOUS at 09:01

## 2019-01-29 NOTE — TELEPHONE ENCOUNTER
----- Message from Sammy Little sent at 1/29/2019  3:08 PM CST -----  Contact: Patient  Advised returning call to office not sure to whom.  Please call [phone]

## 2019-01-29 NOTE — TELEPHONE ENCOUNTER
Called patient to give imaging results. Patient voiced understanding and agreed to f/u for further evaluation. He stating that he's still hurting often and was scheduled to come in 1/31 to f/u with Dr. Chow.

## 2019-01-31 ENCOUNTER — OFFICE VISIT (OUTPATIENT)
Dept: UROLOGY | Facility: CLINIC | Age: 69
End: 2019-01-31
Payer: MEDICARE

## 2019-01-31 VITALS
WEIGHT: 257.25 LBS | HEIGHT: 70 IN | DIASTOLIC BLOOD PRESSURE: 77 MMHG | SYSTOLIC BLOOD PRESSURE: 127 MMHG | BODY MASS INDEX: 36.83 KG/M2 | HEART RATE: 70 BPM | TEMPERATURE: 98 F | RESPIRATION RATE: 18 BRPM

## 2019-01-31 DIAGNOSIS — N20.0 NEPHROLITHIASIS: ICD-10-CM

## 2019-01-31 DIAGNOSIS — R10.9 FLANK PAIN: Primary | ICD-10-CM

## 2019-01-31 PROCEDURE — 99214 OFFICE O/P EST MOD 30 MIN: CPT | Mod: S$GLB,,, | Performed by: UROLOGY

## 2019-01-31 PROCEDURE — 1101F PR PT FALLS ASSESS DOC 0-1 FALLS W/OUT INJ PAST YR: ICD-10-PCS | Mod: CPTII,S$GLB,, | Performed by: UROLOGY

## 2019-01-31 PROCEDURE — 1101F PT FALLS ASSESS-DOCD LE1/YR: CPT | Mod: CPTII,S$GLB,, | Performed by: UROLOGY

## 2019-01-31 PROCEDURE — 99999 PR PBB SHADOW E&M-EST. PATIENT-LVL III: ICD-10-PCS | Mod: PBBFAC,,, | Performed by: UROLOGY

## 2019-01-31 PROCEDURE — 3074F SYST BP LT 130 MM HG: CPT | Mod: CPTII,S$GLB,, | Performed by: UROLOGY

## 2019-01-31 PROCEDURE — 3078F PR MOST RECENT DIASTOLIC BLOOD PRESSURE < 80 MM HG: ICD-10-PCS | Mod: CPTII,S$GLB,, | Performed by: UROLOGY

## 2019-01-31 PROCEDURE — 99214 PR OFFICE/OUTPT VISIT, EST, LEVL IV, 30-39 MIN: ICD-10-PCS | Mod: S$GLB,,, | Performed by: UROLOGY

## 2019-01-31 PROCEDURE — 99999 PR PBB SHADOW E&M-EST. PATIENT-LVL III: CPT | Mod: PBBFAC,,, | Performed by: UROLOGY

## 2019-01-31 PROCEDURE — 3078F DIAST BP <80 MM HG: CPT | Mod: CPTII,S$GLB,, | Performed by: UROLOGY

## 2019-01-31 PROCEDURE — 3074F PR MOST RECENT SYSTOLIC BLOOD PRESSURE < 130 MM HG: ICD-10-PCS | Mod: CPTII,S$GLB,, | Performed by: UROLOGY

## 2019-01-31 NOTE — PROGRESS NOTES
OFFICE NOTE    CHIEF COMPLAINT:  Renal calculi, left back pain.    HISTORY OF PRESENT ILLNESS:  This 68-year-old male has been experiencing pain in   his back over the past year, which is affected by posture and position.  A CT   urogram on 01/29/2019 did reveal number of left renal calculi, the largest 4 mm,   but none of them causing any obstruction and no renal or urologic cause for the   pain could be identified from the imaging study and in fact the CT scan   essentially unchanged from another one that was done a year prior.  The patient   does mention that the pain is affected by certain positions and postures and it   was mentioned to him this is most likely of a musculoskeletal etiology.    MEDICAL HISTORY UPDATE:  Reveals no other change since his recent visit.    UA done on 01/25/2019 was negative.    PHYSICAL EXAMINATION:  ABDOMEN:  Soft, benign, and nontender.  No masses.  EXTERNAL GENITAL:  Normal phallus with adequate meatus.  Testes descended and   feel normal.  No scrotal masses.  RECTAL:  25 g smooth prostate.  No nodules.  Normal sphincter tone.    His last PSA was 0.37 on 12/07/2017, but he states he has had another one done   recently at another labs and he will obtain the results for us.    FINAL IMPRESSION:   1.  Left renal calculi, nonobstructing.  2.  Left-sided back pain, but that appears to be a musculoskeletal etiology.  3.  BPH.    RECOMMENDATION:  The patient will obtain a copy of the PSA report that he has   had done otherwise he was instructed to follow up with his primary care   physician in terms of the back pain to check for musculoskeletal causes.    Otherwise, routine recheck in six months.      BRENNEN  dd: 01/31/2019 09:46:48 (CST)  td: 02/01/2019 04:37:33 (CST)  Doc ID   #[id number]  Job ID #[id number]    CC:

## 2019-02-08 ENCOUNTER — OFFICE VISIT (OUTPATIENT)
Dept: ORTHOPEDICS | Facility: CLINIC | Age: 69
End: 2019-02-08
Payer: MEDICARE

## 2019-02-08 VITALS
SYSTOLIC BLOOD PRESSURE: 108 MMHG | BODY MASS INDEX: 36.51 KG/M2 | HEART RATE: 67 BPM | WEIGHT: 255 LBS | HEIGHT: 70 IN | DIASTOLIC BLOOD PRESSURE: 72 MMHG

## 2019-02-08 DIAGNOSIS — M51.34 DEGENERATIVE DISC DISEASE, THORACIC: ICD-10-CM

## 2019-02-08 DIAGNOSIS — M54.6 ACUTE THORACIC BACK PAIN, UNSPECIFIED BACK PAIN LATERALITY: ICD-10-CM

## 2019-02-08 DIAGNOSIS — M51.36 DDD (DEGENERATIVE DISC DISEASE), LUMBAR: Primary | ICD-10-CM

## 2019-02-08 DIAGNOSIS — M51.36 DDD (DEGENERATIVE DISC DISEASE), LUMBAR: ICD-10-CM

## 2019-02-08 PROCEDURE — 1101F PR PT FALLS ASSESS DOC 0-1 FALLS W/OUT INJ PAST YR: ICD-10-PCS | Mod: ,,, | Performed by: ORTHOPAEDIC SURGERY

## 2019-02-08 PROCEDURE — 3078F PR MOST RECENT DIASTOLIC BLOOD PRESSURE < 80 MM HG: ICD-10-PCS | Mod: ,,, | Performed by: ORTHOPAEDIC SURGERY

## 2019-02-08 PROCEDURE — 1101F PT FALLS ASSESS-DOCD LE1/YR: CPT | Mod: ,,, | Performed by: ORTHOPAEDIC SURGERY

## 2019-02-08 PROCEDURE — 72070 X-RAY EXAM THORAC SPINE 2VWS: CPT | Mod: ,,, | Performed by: ORTHOPAEDIC SURGERY

## 2019-02-08 PROCEDURE — 72100 X-RAY EXAM L-S SPINE 2/3 VWS: CPT | Mod: ,,, | Performed by: ORTHOPAEDIC SURGERY

## 2019-02-08 PROCEDURE — 99213 PR OFFICE/OUTPT VISIT, EST, LEVL III, 20-29 MIN: ICD-10-PCS | Mod: 25,,, | Performed by: ORTHOPAEDIC SURGERY

## 2019-02-08 PROCEDURE — 3074F PR MOST RECENT SYSTOLIC BLOOD PRESSURE < 130 MM HG: ICD-10-PCS | Mod: ,,, | Performed by: ORTHOPAEDIC SURGERY

## 2019-02-08 PROCEDURE — 72100 PR  X-RAY LUMBAR SPINE 2/3 VW: ICD-10-PCS | Mod: ,,, | Performed by: ORTHOPAEDIC SURGERY

## 2019-02-08 PROCEDURE — 72070 PR  X-RAY THORACIC SPINE 2 VW: ICD-10-PCS | Mod: ,,, | Performed by: ORTHOPAEDIC SURGERY

## 2019-02-08 PROCEDURE — 3074F SYST BP LT 130 MM HG: CPT | Mod: ,,, | Performed by: ORTHOPAEDIC SURGERY

## 2019-02-08 PROCEDURE — 3078F DIAST BP <80 MM HG: CPT | Mod: ,,, | Performed by: ORTHOPAEDIC SURGERY

## 2019-02-08 PROCEDURE — 99213 OFFICE O/P EST LOW 20 MIN: CPT | Mod: 25,,, | Performed by: ORTHOPAEDIC SURGERY

## 2019-02-08 RX ORDER — HYDROCODONE BITARTRATE AND ACETAMINOPHEN 7.5; 325 MG/1; MG/1
1 TABLET ORAL EVERY 6 HOURS PRN
Qty: 30 TABLET | Refills: 0 | Status: SHIPPED | OUTPATIENT
Start: 2019-02-08 | End: 2019-02-15

## 2019-02-08 RX ORDER — TIZANIDINE 4 MG/1
4 TABLET ORAL 2 TIMES DAILY
Qty: 60 TABLET | Refills: 0 | Status: SHIPPED | OUTPATIENT
Start: 2019-02-08 | End: 2019-02-08 | Stop reason: SDUPTHER

## 2019-02-08 NOTE — PROGRESS NOTES
[facility]    Subjective:     Chief Complaint:   Chief Complaint   Patient presents with    Lumbar Spine - Pain     Patient states he only has flank pain x 1 year . Denies any lumbar or thoracic pain. He had Abdominal x ray and pelvis CT done at [facility] 1-25-19 which do show DDD in lumbar       Past Medical History:   Diagnosis Date    Arthritis     left knee; right shoulder    Bladder tumor     BPH (benign prostatic hyperplasia)     Hypertension     Sleep apnea     Doews not use C-pap       Past Surgical History:   Procedure Laterality Date    CERVICAL FUSION      CYSTOSCOPY      CYSTOSCOPY WITH RETROGRADE PYELOGRAM Right 9/8/2015    [facility]    EXTRACTION-STONE-URETEROSCOPY Right 9/8/2015    [facility] OR    GASTRIC BYPASS  2012    HERNIA REPAIR      ventral hernia    JOINT REPLACEMENT      Bilateral knees & Rt shoulder    LITHOTRIPSY-EXTRACORPOREAL SHOCK WAVE Left 11/10/2015    [facility]    PROSTATE SURGERY      greenlight laser    RESECTION-MASS  2/7/2012    [facility] at [facility] OR    TONSILLECTOMY      TOTAL KNEE ARTHROPLASTY  left    TOTAL SHOULDER ARTHROPLASTY  right    TURP, USING GREEN LIGHT LASER N/A 2/7/2012    [facility] OR    TYMPANOPLASTY  left    VASECTOMY         Current Outpatient Medications   Medication Sig    amlodipine-valsartan (EXFORGE)  mg per tablet Take 1 tablet by mouth once daily.     celecoxib (CELEBREX) 100 MG capsule     citalopram (CELEXA) 20 MG tablet Take 20 mg by mouth once daily.     fish oil-dha-epa (FISH OIL) 1,200-144-216 mg Cap Take 2 capsules by mouth every morning.      labetalol (NORMODYNE) 200 MG tablet Take 200 mg by mouth 2 (two) times daily. Twice a day    potassium chloride SA (K-DUR,KLOR-CON) 20 MEQ tablet Take 20 mEq by mouth 2 (two) times daily.     pravastatin  (PRAVACHOL) 40 MG tablet Take 40 mg by mouth once daily. Every morning    spironolactone (ALDACTONE) 100 MG tablet     tamsulosin (FLOMAX) 0.4 mg Cp24 Take 2 capsules (0.8 mg total) by mouth every evening. Every day     No current facility-administered medications for this visit.        Review of patient's allergies indicates:   Allergen Reactions    Iodinated contrast- oral and iv dye Itching       Family History   Problem Relation Age of Onset    Benign prostatic hyperplasia Brother     Heart disease Mother     Kidney disease Mother     Arthritis Mother     Mental illness Father 84        dementia    Diabetes Father     Hypertension Father     Heart disease Father     Cancer Father 75        colon    Prostate cancer Neg Hx     Urolithiasis Neg Hx     Kidney cancer Neg Hx        Social History     Socioeconomic History    Marital status:      Spouse name: Not on file    Number of children: Not on file    Years of education: Not on file    Highest education level: Not on file   Social Needs    Financial resource strain: Not on file    Food insecurity - worry: Not on file    Food insecurity - inability: Not on file    Transportation needs - medical: Not on file    Transportation needs - non-medical: Not on file   Occupational History    Not on file   Tobacco Use    Smoking status: Never Smoker    Smokeless tobacco: Never Used   Substance and Sexual Activity    Alcohol use: No    Drug use: No    Sexual activity: Not on file   Other Topics Concern    Not on file   Social History Narrative    Not on file       History of present illness: This man has some left flank type pain for about the last several months. He had that pain with no injury. He's had kidney stones doesn't feel like that. Pain is been getting more regular. He went the emergency room they did CT scan urologist checked him he has minimal calcifications in the kidney there is no evidence for active kidney issues. Nor  GI issues. His pain is left flank does not radiate down the leg. Is somewhat position dependent. He can walk without pain. Sitting hurts as much as anything. Slowly becoming more of an issue      Review of Systems:    Constitution: Negative for chills, fever, and sweats.  Negative for unexplained weight loss.    HENT:  Negative for headaches and blurry vision.    Cardiovascular:Negative for chest pain or irregular heart beat. Negative for hypertension.    Respiratory:  Negative for cough and shortness of breath.    Gastrointestinal: Negative for abdominal pain, heartburn, melena, nausea, and vomitting.    Genitourinary:  Negative bladder incontinence and dysuria.    Musculoskeletal:  See HPI for details.     Neurological: Negative for numbness.    Psychiatric/Behavioral: Negative for depression.  The patient is not nervous/anxious.      Endocrine: Negative for polyuria    Hematologic/Lymphatic: Negative for bleeding problem.  Does not bruise/bleed easily.    Skin: Negative for poor would healing and rash    Objective:      Physical Examination:    Vital Signs:    Vitals:    02/08/19 0905   BP: 108/72   Pulse: 67       Body mass index is 36.59 kg/m².    This a well-developed, well nourished patient in no acute distress.  They are alert and oriented and cooperative to examination.        Physical exam shows he does not have particular tenderness midline thoracic spine. Or lumbar spine. He has no pain around the hips he has no significant issues moving his knees. He does not have a significant straight leg raise he does have some spasm left lumbar and left upper lumbar. And extension to the left does give him some left lumbar and flank pain but no leg pain right side less impressive. He walks a little slowly but does not have a specific limp. She has a CT scan done of his abdomen and pelvis area and he has some degenerative changes noted lower lumbar spine no masses minimal calcifications within the left kidney. Have  an AP pelvis which is included with that AP abdomen and both hips appear to be normal does not have scoliosis lumbar.  Pertinent New Results:    XRAY Report / Interpretation:   AP lateral lumbar spine shows significant degenerative changes at L2-3 L3-4 and L4-5 with narrowing and spurring. At all 3 of those levels. There is no acute changes. There is no listhesis. There is also perhaps minimal wage formation at T12 indicative of would be an old healed minimal vertebral fracture. This does not look acute this is not marked change. AP lateral thoracic spine shows minimal degenerative changes in thoracic spine see no fractures other than that previously stated at T12. Thesis.Electronically Signed By Abad Miller JR, MD    Assessment/Plan:   So he appears to have some left thoracic type pain could be a high lateral disc. Not the typical lower lumbar sciatica. Plan is MRI lumbar and thoracic. Do think we need to include thoracic disease sore up at the T12 area as well. His pain is left flank it is not radicular. I don't have a firm diagnosis yet. I'm looking for some disc herniation or lateral stenosis upper lumbar. We are going to keep him on his anti-inflammatories had some Zanaflex muscle relaxer as well as Norco at night for pain. Works not an issue he is not working      This note was created using Dragon voice recognition software that occasionally misinterpreted phrases or words.

## 2019-02-11 RX ORDER — TIZANIDINE 4 MG/1
TABLET ORAL
Qty: 180 TABLET | Refills: 0 | Status: SHIPPED | OUTPATIENT
Start: 2019-02-11 | End: 2020-02-17

## 2019-02-20 ENCOUNTER — OFFICE VISIT (OUTPATIENT)
Dept: ORTHOPEDICS | Facility: CLINIC | Age: 69
End: 2019-02-20
Payer: MEDICARE

## 2019-02-20 VITALS
HEART RATE: 75 BPM | WEIGHT: 255 LBS | HEIGHT: 70 IN | DIASTOLIC BLOOD PRESSURE: 80 MMHG | BODY MASS INDEX: 36.51 KG/M2 | SYSTOLIC BLOOD PRESSURE: 128 MMHG

## 2019-02-20 DIAGNOSIS — M51.36 DDD (DEGENERATIVE DISC DISEASE), LUMBAR: Primary | ICD-10-CM

## 2019-02-20 DIAGNOSIS — M51.34 DEGENERATIVE DISC DISEASE, THORACIC: ICD-10-CM

## 2019-02-20 PROCEDURE — 3074F SYST BP LT 130 MM HG: CPT | Mod: ,,, | Performed by: ORTHOPAEDIC SURGERY

## 2019-02-20 PROCEDURE — 3074F PR MOST RECENT SYSTOLIC BLOOD PRESSURE < 130 MM HG: ICD-10-PCS | Mod: ,,, | Performed by: ORTHOPAEDIC SURGERY

## 2019-02-20 PROCEDURE — 99213 OFFICE O/P EST LOW 20 MIN: CPT | Mod: ,,, | Performed by: ORTHOPAEDIC SURGERY

## 2019-02-20 PROCEDURE — 1101F PT FALLS ASSESS-DOCD LE1/YR: CPT | Mod: ,,, | Performed by: ORTHOPAEDIC SURGERY

## 2019-02-20 PROCEDURE — 99213 PR OFFICE/OUTPT VISIT, EST, LEVL III, 20-29 MIN: ICD-10-PCS | Mod: ,,, | Performed by: ORTHOPAEDIC SURGERY

## 2019-02-20 PROCEDURE — 1101F PR PT FALLS ASSESS DOC 0-1 FALLS W/OUT INJ PAST YR: ICD-10-PCS | Mod: ,,, | Performed by: ORTHOPAEDIC SURGERY

## 2019-02-20 PROCEDURE — 3079F PR MOST RECENT DIASTOLIC BLOOD PRESSURE 80-89 MM HG: ICD-10-PCS | Mod: ,,, | Performed by: ORTHOPAEDIC SURGERY

## 2019-02-20 PROCEDURE — 3079F DIAST BP 80-89 MM HG: CPT | Mod: ,,, | Performed by: ORTHOPAEDIC SURGERY

## 2019-02-20 NOTE — PROGRESS NOTES
[facility]    Subjective:     Chief Complaint:   Chief Complaint   Patient presents with    Lumbar Spine - Pain     L spine MRI results f/u. States that his pain is about the same    Thoracic Spine - Pain     T spine MRI results f/u. States that the pain is about the same       Past Medical History:   Diagnosis Date    Arthritis     left knee; right shoulder    Bladder tumor     BPH (benign prostatic hyperplasia)     Hypertension     Sleep apnea     Doews not use C-pap       Past Surgical History:   Procedure Laterality Date    CERVICAL FUSION      CYSTOSCOPY      CYSTOSCOPY WITH RETROGRADE PYELOGRAM Right 9/8/2015    [facility]    EXTRACTION-STONE-URETEROSCOPY Right 9/8/2015    [facility]    GASTRIC BYPASS  2012    HERNIA REPAIR      ventral hernia    JOINT REPLACEMENT      Bilateral knees & Rt shoulder    LITHOTRIPSY-EXTRACORPOREAL SHOCK WAVE Left 11/10/2015    [facility]    PROSTATE SURGERY      greenlight laser    RESECTION-MASS  2/7/2012    [facility]    TONSILLECTOMY      TOTAL KNEE ARTHROPLASTY  left    TOTAL SHOULDER ARTHROPLASTY  right    TURP, USING GREEN LIGHT LASER N/A 2/7/2012    [facility] OR    TYMPANOPLASTY  left    VASECTOMY         Current Outpatient Medications   Medication Sig    amlodipine-valsartan (EXFORGE)  mg per tablet Take 1 tablet by mouth once daily.     celecoxib (CELEBREX) 100 MG capsule     citalopram (CELEXA) 20 MG tablet Take 20 mg by mouth once daily.     fish oil-dha-epa (FISH OIL) 1,200-144-216 mg Cap Take 2 capsules by mouth every morning.      labetalol (NORMODYNE) 200 MG tablet Take 200 mg by mouth 2 (two) times daily. Twice a day    potassium chloride SA (K-DUR,KLOR-CON) 20 MEQ tablet Take 20 mEq by mouth 2 (two) times daily.     pravastatin (PRAVACHOL) 40 MG  tablet Take 40 mg by mouth once daily. Every morning    spironolactone (ALDACTONE) 100 MG tablet     tamsulosin (FLOMAX) 0.4 mg Cp24 Take 2 capsules (0.8 mg total) by mouth every evening. Every day    tiZANidine (ZANAFLEX) 4 MG tablet TAKE 1 TABLET(4 MG) BY MOUTH TWICE DAILY     No current facility-administered medications for this visit.        Review of patient's allergies indicates:   Allergen Reactions    Iodinated contrast- oral and iv dye Itching       Family History   Problem Relation Age of Onset    Benign prostatic hyperplasia Brother     Heart disease Mother     Kidney disease Mother     Arthritis Mother     Mental illness Father 84        dementia    Diabetes Father     Hypertension Father     Heart disease Father     Cancer Father 75        colon    Prostate cancer Neg Hx     Urolithiasis Neg Hx     Kidney cancer Neg Hx        Social History     Socioeconomic History    Marital status:      Spouse name: Not on file    Number of children: Not on file    Years of education: Not on file    Highest education level: Not on file   Social Needs    Financial resource strain: Not on file    Food insecurity - worry: Not on file    Food insecurity - inability: Not on file    Transportation needs - medical: Not on file    Transportation needs - non-medical: Not on file   Occupational History    Not on file   Tobacco Use    Smoking status: Never Smoker    Smokeless tobacco: Never Used   Substance and Sexual Activity    Alcohol use: No    Drug use: No    Sexual activity: Not on file   Other Topics Concern    Not on file   Social History Narrative    Not on file       History of present illness: Looking at the MRIs thoracic and lumbar. He has unusual left lower thoracic pain. For a year. The thoracic MRI shows some Modic changes in one level the midthoracic spine some degenerative changes and thoracic spine there is no tumor there is no vertebral collapse is no significant  central stenosis the radiologist thought there may be some relative lateral stenosis at T10-11 left. But not super impressive. We also have an MRI lumbar which shows degenerative changes at the L4-5 disc without listhesis there is some Modic changes there there is no significant stenosis or ruptured disc in the lumbar spine only some generalized degenerative changes. His pain is still left lower thoracic. Seems to be worse when he stands up from sitting. He cannot put his finger on it. He's had negative  workup showing some history of kidney stones but no active disease.      Review of Systems:    Constitution: Negative for chills, fever, and sweats.  Negative for unexplained weight loss.    HENT:  Negative for headaches and blurry vision.    Cardiovascular:Negative for chest pain or irregular heart beat. Negative for hypertension.    Respiratory:  Negative for cough and shortness of breath.    Gastrointestinal: Negative for abdominal pain, heartburn, melena, nausea, and vomitting.    Genitourinary:  Negative bladder incontinence and dysuria.    Musculoskeletal:  See HPI for details.     Neurological: Negative for numbness.    Psychiatric/Behavioral: Negative for depression.  The patient is not nervous/anxious.      Endocrine: Negative for polyuria    Hematologic/Lymphatic: Negative for bleeding problem.  Does not bruise/bleed easily.    Skin: Negative for poor would healing and rash    Objective:      Physical Examination:    Vital Signs:    Vitals:    02/20/19 1124   BP: 128/80   Pulse: 75       Body mass index is 36.59 kg/m².    This a well-developed, well nourished patient in no acute distress.  They are alert and oriented and cooperative to examination.        Physical exam shows the area of tenderness is around T10 rib left and posterior. He is not particularly tender midline thoracic spine or lumbar. It is a no pain with rotations T spine or extension lumbar spine. we cannot put ar finger on a tender spot  in the spine or the rib. Pertinent New Results:    XRAY Report / Interpretation:       Assessment/Plan:      My impression he does have some degenerative thoracic issues and perhaps some relative lateral stenosis at HEENT 1011 on the left we don't have any other visceral causes for his pain. Probably is musculoskeletal from what I can see. I would recommend a trial of facet injections lower thoracic spine left side. He has seen Dr. Shaw in the past for neck injections. We will do a referral to Dr. jose rafael bledsoe to do some injections presumably lower T-spine set. The patient does not describe severe radicular pain around the rib cage. And certainly no lumbar radicular pain down the leg      This note was created using Dragon voice recognition software that occasionally misinterpreted phrases or words.

## 2019-08-26 ENCOUNTER — OFFICE VISIT (OUTPATIENT)
Dept: ORTHOPEDICS | Facility: CLINIC | Age: 69
End: 2019-08-26
Payer: MEDICARE

## 2019-08-26 VITALS
DIASTOLIC BLOOD PRESSURE: 70 MMHG | WEIGHT: 255 LBS | HEART RATE: 75 BPM | HEIGHT: 70 IN | BODY MASS INDEX: 36.51 KG/M2 | SYSTOLIC BLOOD PRESSURE: 120 MMHG

## 2019-08-26 DIAGNOSIS — M19.012 GLENOHUMERAL ARTHRITIS, LEFT: Primary | ICD-10-CM

## 2019-08-26 PROCEDURE — 3078F PR MOST RECENT DIASTOLIC BLOOD PRESSURE < 80 MM HG: ICD-10-PCS | Mod: S$GLB,,, | Performed by: ORTHOPAEDIC SURGERY

## 2019-08-26 PROCEDURE — 99214 PR OFFICE/OUTPT VISIT, EST, LEVL IV, 30-39 MIN: ICD-10-PCS | Mod: S$GLB,,, | Performed by: ORTHOPAEDIC SURGERY

## 2019-08-26 PROCEDURE — 1101F PR PT FALLS ASSESS DOC 0-1 FALLS W/OUT INJ PAST YR: ICD-10-PCS | Mod: S$GLB,,, | Performed by: ORTHOPAEDIC SURGERY

## 2019-08-26 PROCEDURE — 3074F SYST BP LT 130 MM HG: CPT | Mod: S$GLB,,, | Performed by: ORTHOPAEDIC SURGERY

## 2019-08-26 PROCEDURE — 3078F DIAST BP <80 MM HG: CPT | Mod: S$GLB,,, | Performed by: ORTHOPAEDIC SURGERY

## 2019-08-26 PROCEDURE — 1101F PT FALLS ASSESS-DOCD LE1/YR: CPT | Mod: S$GLB,,, | Performed by: ORTHOPAEDIC SURGERY

## 2019-08-26 PROCEDURE — 3074F PR MOST RECENT SYSTOLIC BLOOD PRESSURE < 130 MM HG: ICD-10-PCS | Mod: S$GLB,,, | Performed by: ORTHOPAEDIC SURGERY

## 2019-08-26 PROCEDURE — 99214 OFFICE O/P EST MOD 30 MIN: CPT | Mod: S$GLB,,, | Performed by: ORTHOPAEDIC SURGERY

## 2019-08-26 NOTE — PROGRESS NOTES
[facility]    Subjective:     Chief Complaint:   Chief Complaint   Patient presents with    Left Shoulder - Pain     Left shoulder pain x few years. States that he would like to set up surgery for his left shoulder. Pain is getting worse along with ROM.        Past Medical History:   Diagnosis Date    Arthritis     left knee; right shoulder    Bladder tumor     BPH (benign prostatic hyperplasia)     Hypertension     Sleep apnea     Doews not use C-pap       Past Surgical History:   Procedure Laterality Date    CERVICAL FUSION      CYSTOSCOPY      CYSTOSCOPY WITH RETROGRADE PYELOGRAM Right 9/8/2015    [facility]    EXTRACTION-STONE-URETEROSCOPY Right 9/8/2015    [facility] OR    GASTRIC BYPASS  2012    HERNIA REPAIR      ventral hernia    JOINT REPLACEMENT      Bilateral knees & Rt shoulder    LITHOTRIPSY-EXTRACORPOREAL SHOCK WAVE Left 11/10/2015    [facility] OR    PROSTATE SURGERY      greenlight laser    RESECTION-MASS  2/7/2012    [facility] OR    TONSILLECTOMY      TOTAL KNEE ARTHROPLASTY  left    TOTAL SHOULDER ARTHROPLASTY  right    TURP, USING GREEN LIGHT LASER N/A 2/7/2012    [facility] OR    TYMPANOPLASTY  left    VASECTOMY         Current Outpatient Medications   Medication Sig    amlodipine-valsartan (EXFORGE)  mg per tablet Take 1 tablet by mouth once daily.     celecoxib (CELEBREX) 100 MG capsule     citalopram (CELEXA) 20 MG tablet Take 20 mg by mouth once daily.     fish oil-dha-epa (FISH OIL) 1,200-144-216 mg Cap Take 2 capsules by mouth every morning.      labetalol (NORMODYNE) 200 MG tablet Take 200 mg by mouth 2 (two) times daily. Twice a day    potassium chloride SA (K-DUR,KLOR-CON) 20 MEQ tablet Take 20 mEq by mouth 2 (two) times daily.     pravastatin (PRAVACHOL) 40 MG tablet Take 40 mg  by mouth once daily. Every morning    spironolactone (ALDACTONE) 100 MG tablet     tamsulosin (FLOMAX) 0.4 mg Cp24 Take 2 capsules (0.8 mg total) by mouth every evening. Every day    tiZANidine (ZANAFLEX) 4 MG tablet TAKE 1 TABLET(4 MG) BY MOUTH TWICE DAILY     No current facility-administered medications for this visit.        Review of patient's allergies indicates:   Allergen Reactions    Iodinated contrast- oral and iv dye Itching       Family History   Problem Relation Age of Onset    Benign prostatic hyperplasia Brother     Heart disease Mother     Kidney disease Mother     Arthritis Mother     Mental illness Father 84        dementia    Diabetes Father     Hypertension Father     Heart disease Father     Cancer Father 75        colon    Prostate cancer Neg Hx     Urolithiasis Neg Hx     Kidney cancer Neg Hx        Social History     Socioeconomic History    Marital status:      Spouse name: Not on file    Number of children: Not on file    Years of education: Not on file    Highest education level: Not on file   Occupational History    Not on file   Social Needs    Financial resource strain: Not on file    Food insecurity:     Worry: Not on file     Inability: Not on file    Transportation needs:     Medical: Not on file     Non-medical: Not on file   Tobacco Use    Smoking status: Never Smoker    Smokeless tobacco: Never Used   Substance and Sexual Activity    Alcohol use: No    Drug use: No    Sexual activity: Not on file   Lifestyle    Physical activity:     Days per week: Not on file     Minutes per session: Not on file    Stress: Not on file   Relationships    Social connections:     Talks on phone: Not on file     Gets together: Not on file     Attends Zoroastrianism service: Not on file     Active member of club or organization: Not on file     Attends meetings of clubs or organizations: Not on file     Relationship status: Not on file   Other Topics Concern    Not on  file   Social History Narrative    Not on file       History of present illness: Left shoulder pain progressive for years. He got loss of motion with pain.      Review of Systems:    Constitution: Negative for chills, fever, and sweats.  Negative for unexplained weight loss.    HENT:  Negative for headaches and blurry vision.    Cardiovascular:Negative for chest pain or irregular heart beat. Negative for hypertension.    Respiratory:  Negative for cough and shortness of breath.    Gastrointestinal: Negative for abdominal pain, heartburn, melena, nausea, and vomitting.    Genitourinary:  Negative bladder incontinence and dysuria.    Musculoskeletal:  See HPI for details.     Neurological: Negative for numbness.    Psychiatric/Behavioral: Negative for depression.  The patient is not nervous/anxious.      Endocrine: Negative for polyuria    Hematologic/Lymphatic: Negative for bleeding problem.  Does not bruise/bleed easily.    Skin: Negative for poor would healing and rash    Objective:      Physical Examination:    Vital Signs:    Vitals:    08/26/19 1345   BP: 120/70   Pulse: 75       Body mass index is 36.59 kg/m².    This a well-developed, well nourished patient in no acute distress.  They are alert and oriented and cooperative to examination.        Physical physical exam left shoulder shows he scat decreased motion in all planes rotation is poor forward flexion 120 max abduction less than 90. His right shoulder also has decreased motion. He's had surgery on his right shoulder as well around 2013  Pertinent New Results:    XRAY Report / Interpretation:   AP x-ray left shoulder shows that there is very significant glenohumeral arthritis with the loss of the joint space some sclerosis but not much spurring on the humeral head. There is no superior migration. There are no acute findings. We also have AP x-ray right shoulder which shows a shoulder arthroplasty. They're to cervical lives wires around the proximal  humerus. There is a proximal humerus prosthesis which is slightly inferior compared to the greater tuberosity. And there is probably a glenoid implant on the scapular side of the shoulder joint. There are no acute findings.Electronically Signed By Abad Miller JR, MD    Assessment/Plan:      So my impression is glenohumeral arthritis needs total shoulder left routine anatomic shoulder. We are preparing to do that for his left shoulder. He needs a preop CT scan to show glenoid version. The right shoulder actually may have the may need future surgery as well to improve it. We will get the left shoulder fixed first,, thenknee would do a CT scan of the right shoulder to see if there is something to be done to improve the right shoulder. History and physical for anatomic shoulder left done today      This note was created using Dragon voice recognition software that occasionally misinterpreted phrases or words.

## 2019-08-27 DIAGNOSIS — Z01.818 PREOP EXAMINATION: ICD-10-CM

## 2019-08-27 DIAGNOSIS — M19.012 PRIMARY OSTEOARTHRITIS OF LEFT SHOULDER: Primary | ICD-10-CM

## 2019-08-27 DIAGNOSIS — M19.012 DEGENERATIVE ARTHRITIS OF LEFT SHOULDER REGION: ICD-10-CM

## 2019-08-27 RX ORDER — SODIUM CHLORIDE 0.9 % (FLUSH) 0.9 %
3 SYRINGE (ML) INJECTION
Status: CANCELLED | OUTPATIENT
Start: 2019-08-27

## 2019-08-27 RX ORDER — MUPIROCIN 20 MG/G
OINTMENT TOPICAL
Status: CANCELLED | OUTPATIENT
Start: 2019-08-27

## 2019-08-28 ENCOUNTER — TELEPHONE (OUTPATIENT)
Dept: ORTHOPEDICS | Facility: CLINIC | Age: 69
End: 2019-08-28

## 2019-09-05 ENCOUNTER — HOSPITAL ENCOUNTER (OUTPATIENT)
Dept: RADIOLOGY | Facility: HOSPITAL | Age: 69
Discharge: HOME | End: 2019-09-05
Attending: ORTHOPAEDIC SURGERY
Payer: MEDICARE

## 2019-09-05 DIAGNOSIS — M19.012 GLENOHUMERAL ARTHRITIS, LEFT: ICD-10-CM

## 2019-09-05 PROCEDURE — 73200 CT UPPER EXTREMITY W/O DYE: CPT | Mod: TC,PO,LT

## 2019-09-10 ENCOUNTER — HOSPITAL ENCOUNTER (OUTPATIENT)
Dept: PREADMISSION TESTING | Facility: HOSPITAL | Age: 69
Discharge: HOME | End: 2019-09-10
Attending: ORTHOPAEDIC SURGERY
Payer: MEDICARE

## 2019-09-10 ENCOUNTER — HOSPITAL ENCOUNTER (OUTPATIENT)
Dept: RADIOLOGY | Facility: HOSPITAL | Age: 69
Discharge: HOME | End: 2019-09-10
Attending: ORTHOPAEDIC SURGERY
Payer: MEDICARE

## 2019-09-10 VITALS — HEIGHT: 71 IN | WEIGHT: 248 LBS | BODY MASS INDEX: 34.72 KG/M2

## 2019-09-10 DIAGNOSIS — M19.012 PRIMARY OSTEOARTHRITIS OF LEFT SHOULDER: ICD-10-CM

## 2019-09-10 DIAGNOSIS — Z01.818 PREOP EXAMINATION: ICD-10-CM

## 2019-09-10 LAB
ABO + RH BLD: NORMAL
ALBUMIN SERPL BCP-MCNC: 4.4 G/DL (ref 3.5–5.2)
ALP SERPL-CCNC: 60 U/L (ref 55–135)
ALT SERPL W/O P-5'-P-CCNC: 29 U/L (ref 10–44)
ANION GAP SERPL CALC-SCNC: 10 MMOL/L (ref 8–16)
AST SERPL-CCNC: 19 U/L (ref 10–40)
BASOPHILS # BLD AUTO: 0.02 K/UL (ref 0–0.2)
BASOPHILS NFR BLD: 0.4 % (ref 0–1.9)
BILIRUB SERPL-MCNC: 0.4 MG/DL (ref 0.1–1)
BILIRUB UR QL STRIP: ABNORMAL
BLD GP AB SCN CELLS X3 SERPL QL: NORMAL
BUN SERPL-MCNC: 22 MG/DL (ref 8–23)
CALCIUM SERPL-MCNC: 9.5 MG/DL (ref 8.7–10.5)
CHLORIDE SERPL-SCNC: 107 MMOL/L (ref 95–110)
CLARITY UR: CLEAR
CO2 SERPL-SCNC: 27 MMOL/L (ref 23–29)
COLOR UR: YELLOW
CREAT SERPL-MCNC: 1.1 MG/DL (ref 0.5–1.4)
DIFFERENTIAL METHOD: ABNORMAL
EOSINOPHIL # BLD AUTO: 0.1 K/UL (ref 0–0.5)
EOSINOPHIL NFR BLD: 1.6 % (ref 0–8)
ERYTHROCYTE [DISTWIDTH] IN BLOOD BY AUTOMATED COUNT: 12 % (ref 11.5–14.5)
EST. GFR  (AFRICAN AMERICAN): >60 ML/MIN/1.73 M^2
EST. GFR  (NON AFRICAN AMERICAN): >60 ML/MIN/1.73 M^2
GLUCOSE SERPL-MCNC: 89 MG/DL (ref 70–110)
GLUCOSE UR QL STRIP: NEGATIVE
HCT VFR BLD AUTO: 37.8 % (ref 40–54)
HGB BLD-MCNC: 12.9 G/DL (ref 14–18)
HGB UR QL STRIP: NEGATIVE
IMM GRANULOCYTES # BLD AUTO: 0.03 K/UL (ref 0–0.04)
KETONES UR QL STRIP: ABNORMAL
LEUKOCYTE ESTERASE UR QL STRIP: NEGATIVE
LYMPHOCYTES # BLD AUTO: 1.5 K/UL (ref 1–4.8)
LYMPHOCYTES NFR BLD: 26.7 % (ref 18–48)
MCH RBC QN AUTO: 31 PG (ref 27–31)
MCHC RBC AUTO-ENTMCNC: 34.1 G/DL (ref 32–36)
MCV RBC AUTO: 91 FL (ref 82–98)
MONOCYTES # BLD AUTO: 0.4 K/UL (ref 0.3–1)
MONOCYTES NFR BLD: 6.8 % (ref 4–15)
NEUTROPHILS # BLD AUTO: 3.7 K/UL (ref 1.8–7.7)
NEUTROPHILS NFR BLD: 64 % (ref 38–73)
NITRITE UR QL STRIP: NEGATIVE
NRBC BLD-RTO: 0 /100 WBC
PH UR STRIP: 6 [PH] (ref 5–8)
PLATELET # BLD AUTO: 238 K/UL (ref 150–350)
PMV BLD AUTO: 10.2 FL (ref 9.2–12.9)
POTASSIUM SERPL-SCNC: 4.3 MMOL/L (ref 3.5–5.1)
PROT SERPL-MCNC: 6.9 G/DL (ref 6–8.4)
PROT UR QL STRIP: ABNORMAL
RBC # BLD AUTO: 4.16 M/UL (ref 4.6–6.2)
SODIUM SERPL-SCNC: 144 MMOL/L (ref 136–145)
SP GR UR STRIP: >=1.03 (ref 1–1.03)
URN SPEC COLLECT METH UR: ABNORMAL
UROBILINOGEN UR STRIP-ACNC: 1 EU/DL
WBC # BLD AUTO: 5.7 K/UL (ref 3.9–12.7)

## 2019-09-10 PROCEDURE — 99900103 DSU ONLY-NO CHARGE-INITIAL HR (STAT)

## 2019-09-10 PROCEDURE — 71046 X-RAY EXAM CHEST 2 VIEWS: CPT | Mod: 26,,, | Performed by: RADIOLOGY

## 2019-09-10 PROCEDURE — 36415 COLL VENOUS BLD VENIPUNCTURE: CPT

## 2019-09-10 PROCEDURE — 71046 XR CHEST PA AND LATERAL: ICD-10-PCS | Mod: 26,,, | Performed by: RADIOLOGY

## 2019-09-10 PROCEDURE — 71046 X-RAY EXAM CHEST 2 VIEWS: CPT | Mod: TC,FY

## 2019-09-10 PROCEDURE — 86850 RBC ANTIBODY SCREEN: CPT

## 2019-09-10 PROCEDURE — 87081 CULTURE SCREEN ONLY: CPT

## 2019-09-10 PROCEDURE — 85025 COMPLETE CBC W/AUTO DIFF WBC: CPT

## 2019-09-10 PROCEDURE — 80053 COMPREHEN METABOLIC PANEL: CPT

## 2019-09-10 PROCEDURE — 81003 URINALYSIS AUTO W/O SCOPE: CPT

## 2019-09-10 PROCEDURE — 99900104 DSU ONLY-NO CHARGE-EA ADD'L HR (STAT)

## 2019-09-10 NOTE — DISCHARGE INSTRUCTIONS

## 2019-09-12 LAB — MRSA SPEC QL CULT: NORMAL

## 2019-09-23 ENCOUNTER — ANESTHESIA EVENT (OUTPATIENT)
Dept: SURGERY | Facility: HOSPITAL | Age: 69
DRG: 483 | End: 2019-09-23
Payer: MEDICARE

## 2019-09-24 ENCOUNTER — HOSPITAL ENCOUNTER (INPATIENT)
Facility: HOSPITAL | Age: 69
LOS: 1 days | Discharge: HOME | DRG: 483 | End: 2019-09-25
Attending: ORTHOPAEDIC SURGERY | Admitting: ORTHOPAEDIC SURGERY
Payer: MEDICARE

## 2019-09-24 ENCOUNTER — ANESTHESIA (OUTPATIENT)
Dept: SURGERY | Facility: HOSPITAL | Age: 69
DRG: 483 | End: 2019-09-24
Payer: MEDICARE

## 2019-09-24 DIAGNOSIS — M19.012 PRIMARY OSTEOARTHRITIS OF LEFT SHOULDER: Primary | ICD-10-CM

## 2019-09-24 DIAGNOSIS — M19.012 DEGENERATIVE ARTHRITIS OF LEFT SHOULDER REGION: ICD-10-CM

## 2019-09-24 PROBLEM — I10 ESSENTIAL HYPERTENSION: Status: ACTIVE | Noted: 2019-09-24

## 2019-09-24 PROBLEM — N40.0 BPH (BENIGN PROSTATIC HYPERPLASIA): Status: ACTIVE | Noted: 2019-09-24

## 2019-09-24 PROBLEM — G47.30 SLEEP APNEA: Status: ACTIVE | Noted: 2019-09-24

## 2019-09-24 PROBLEM — E78.5 HYPERLIPIDEMIA: Status: ACTIVE | Noted: 2019-09-24

## 2019-09-24 PROCEDURE — D9220A PRA ANESTHESIA: Mod: ANES,,, | Performed by: ANESTHESIOLOGY

## 2019-09-24 PROCEDURE — 25000003 PHARM REV CODE 250: Performed by: ANESTHESIOLOGY

## 2019-09-24 PROCEDURE — 76942 ECHO GUIDE FOR BIOPSY: CPT | Performed by: ANESTHESIOLOGY

## 2019-09-24 PROCEDURE — 76942 LEFT INTERSCALENE NERVE BLOCK: ICD-10-PCS | Mod: 26,,, | Performed by: ANESTHESIOLOGY

## 2019-09-24 PROCEDURE — 37000009 HC ANESTHESIA EA ADD 15 MINS: Performed by: ORTHOPAEDIC SURGERY

## 2019-09-24 PROCEDURE — D9220A PRA ANESTHESIA: Mod: CRNA,,, | Performed by: NURSE ANESTHETIST, CERTIFIED REGISTERED

## 2019-09-24 PROCEDURE — 37000008 HC ANESTHESIA 1ST 15 MINUTES: Performed by: ORTHOPAEDIC SURGERY

## 2019-09-24 PROCEDURE — 99900103 DSU ONLY-NO CHARGE-INITIAL HR (STAT): Performed by: ORTHOPAEDIC SURGERY

## 2019-09-24 PROCEDURE — 64415 LEFT INTERSCALENE NERVE BLOCK: ICD-10-PCS | Mod: 59,LT,, | Performed by: ANESTHESIOLOGY

## 2019-09-24 PROCEDURE — 71000033 HC RECOVERY, INTIAL HOUR: Performed by: ORTHOPAEDIC SURGERY

## 2019-09-24 PROCEDURE — 27000221 HC OXYGEN, UP TO 24 HOURS

## 2019-09-24 PROCEDURE — 63600175 PHARM REV CODE 636 W HCPCS: Performed by: ORTHOPAEDIC SURGERY

## 2019-09-24 PROCEDURE — 99900035 HC TECH TIME PER 15 MIN (STAT)

## 2019-09-24 PROCEDURE — D9220A PRA ANESTHESIA: ICD-10-PCS | Mod: ANES,,, | Performed by: ANESTHESIOLOGY

## 2019-09-24 PROCEDURE — C1729 CATH, DRAINAGE: HCPCS | Performed by: ORTHOPAEDIC SURGERY

## 2019-09-24 PROCEDURE — 63600175 PHARM REV CODE 636 W HCPCS: Performed by: NURSE ANESTHETIST, CERTIFIED REGISTERED

## 2019-09-24 PROCEDURE — 63600175 PHARM REV CODE 636 W HCPCS: Performed by: ANESTHESIOLOGY

## 2019-09-24 PROCEDURE — 94799 UNLISTED PULMONARY SVC/PX: CPT

## 2019-09-24 PROCEDURE — 27201423 OPTIME MED/SURG SUP & DEVICES STERILE SUPPLY: Performed by: ORTHOPAEDIC SURGERY

## 2019-09-24 PROCEDURE — 99900104 DSU ONLY-NO CHARGE-EA ADD'L HR (STAT): Performed by: ORTHOPAEDIC SURGERY

## 2019-09-24 PROCEDURE — 64415 NJX AA&/STRD BRCH PLXS IMG: CPT | Mod: 59,LT,, | Performed by: ANESTHESIOLOGY

## 2019-09-24 PROCEDURE — 36000711: Performed by: ORTHOPAEDIC SURGERY

## 2019-09-24 PROCEDURE — 12000002 HC ACUTE/MED SURGE SEMI-PRIVATE ROOM

## 2019-09-24 PROCEDURE — D9220A PRA ANESTHESIA: ICD-10-PCS | Mod: CRNA,,, | Performed by: NURSE ANESTHETIST, CERTIFIED REGISTERED

## 2019-09-24 PROCEDURE — C1713 ANCHOR/SCREW BN/BN,TIS/BN: HCPCS | Performed by: ORTHOPAEDIC SURGERY

## 2019-09-24 PROCEDURE — C1776 JOINT DEVICE (IMPLANTABLE): HCPCS | Performed by: ORTHOPAEDIC SURGERY

## 2019-09-24 PROCEDURE — 25000003 PHARM REV CODE 250: Performed by: ORTHOPAEDIC SURGERY

## 2019-09-24 PROCEDURE — S0020 INJECTION, BUPIVICAINE HYDRO: HCPCS | Performed by: ANESTHESIOLOGY

## 2019-09-24 PROCEDURE — 63600175 PHARM REV CODE 636 W HCPCS

## 2019-09-24 PROCEDURE — 94761 N-INVAS EAR/PLS OXIMETRY MLT: CPT

## 2019-09-24 PROCEDURE — 25000003 PHARM REV CODE 250: Performed by: NURSE ANESTHETIST, CERTIFIED REGISTERED

## 2019-09-24 PROCEDURE — 71000039 HC RECOVERY, EACH ADD'L HOUR: Performed by: ORTHOPAEDIC SURGERY

## 2019-09-24 PROCEDURE — 36000710: Performed by: ORTHOPAEDIC SURGERY

## 2019-09-24 DEVICE — CEMENT BONE ANTIBIO SIMPLEX P: Type: IMPLANTABLE DEVICE | Site: SHOULDER | Status: FUNCTIONAL

## 2019-09-24 DEVICE — IMPLANTABLE DEVICE: Type: IMPLANTABLE DEVICE | Site: SHOULDER | Status: FUNCTIONAL

## 2019-09-24 DEVICE — POST GLENOID HYBRID MODULAR: Type: IMPLANTABLE DEVICE | Site: SHOULDER | Status: FUNCTIONAL

## 2019-09-24 DEVICE — HUMERAL HEAD STAND TAPER TI: Type: IMPLANTABLE DEVICE | Site: SHOULDER | Status: FUNCTIONAL

## 2019-09-24 RX ORDER — ONDANSETRON 2 MG/ML
4 INJECTION INTRAMUSCULAR; INTRAVENOUS DAILY PRN
Status: DISCONTINUED | OUTPATIENT
Start: 2019-09-24 | End: 2019-09-24 | Stop reason: HOSPADM

## 2019-09-24 RX ORDER — GLYCOPYRROLATE 0.2 MG/ML
INJECTION INTRAMUSCULAR; INTRAVENOUS
Status: DISCONTINUED | OUTPATIENT
Start: 2019-09-24 | End: 2019-09-24

## 2019-09-24 RX ORDER — SODIUM CHLORIDE 0.9 % (FLUSH) 0.9 %
3 SYRINGE (ML) INJECTION
Status: DISCONTINUED | OUTPATIENT
Start: 2019-09-24 | End: 2019-09-24 | Stop reason: HOSPADM

## 2019-09-24 RX ORDER — ASPIRIN 325 MG
325 TABLET ORAL DAILY
Status: DISCONTINUED | OUTPATIENT
Start: 2019-09-24 | End: 2019-09-25 | Stop reason: HOSPADM

## 2019-09-24 RX ORDER — MUPIROCIN 20 MG/G
1 OINTMENT TOPICAL 2 TIMES DAILY
Status: DISCONTINUED | OUTPATIENT
Start: 2019-09-24 | End: 2019-09-25 | Stop reason: HOSPADM

## 2019-09-24 RX ORDER — VALSARTAN 80 MG/1
320 TABLET ORAL DAILY
Status: DISCONTINUED | OUTPATIENT
Start: 2019-09-24 | End: 2019-09-25 | Stop reason: HOSPADM

## 2019-09-24 RX ORDER — DIPHENHYDRAMINE HYDROCHLORIDE 50 MG/ML
25 INJECTION INTRAMUSCULAR; INTRAVENOUS EVERY 6 HOURS PRN
Status: DISCONTINUED | OUTPATIENT
Start: 2019-09-24 | End: 2019-09-24 | Stop reason: HOSPADM

## 2019-09-24 RX ORDER — ONDANSETRON HYDROCHLORIDE 2 MG/ML
INJECTION, SOLUTION INTRAMUSCULAR; INTRAVENOUS
Status: DISCONTINUED | OUTPATIENT
Start: 2019-09-24 | End: 2019-09-24

## 2019-09-24 RX ORDER — BUPIVACAINE HYDROCHLORIDE 5 MG/ML
INJECTION, SOLUTION EPIDURAL; INTRACAUDAL
Status: COMPLETED | OUTPATIENT
Start: 2019-09-24 | End: 2019-09-24

## 2019-09-24 RX ORDER — FENTANYL CITRATE 50 UG/ML
25 INJECTION, SOLUTION INTRAMUSCULAR; INTRAVENOUS EVERY 5 MIN PRN
Status: DISCONTINUED | OUTPATIENT
Start: 2019-09-24 | End: 2019-09-24 | Stop reason: HOSPADM

## 2019-09-24 RX ORDER — HYDROMORPHONE HYDROCHLORIDE 2 MG/ML
0.2 INJECTION, SOLUTION INTRAMUSCULAR; INTRAVENOUS; SUBCUTANEOUS EVERY 5 MIN PRN
Status: DISCONTINUED | OUTPATIENT
Start: 2019-09-24 | End: 2019-09-24 | Stop reason: HOSPADM

## 2019-09-24 RX ORDER — FENTANYL CITRATE 50 UG/ML
INJECTION, SOLUTION INTRAMUSCULAR; INTRAVENOUS
Status: DISCONTINUED | OUTPATIENT
Start: 2019-09-24 | End: 2019-09-24

## 2019-09-24 RX ORDER — DEXAMETHASONE SODIUM PHOSPHATE 4 MG/ML
INJECTION, SOLUTION INTRA-ARTICULAR; INTRALESIONAL; INTRAMUSCULAR; INTRAVENOUS; SOFT TISSUE
Status: DISCONTINUED | OUTPATIENT
Start: 2019-09-24 | End: 2019-09-24

## 2019-09-24 RX ORDER — SODIUM CHLORIDE 0.9 % (FLUSH) 0.9 %
3 SYRINGE (ML) INJECTION EVERY 8 HOURS
Status: DISCONTINUED | OUTPATIENT
Start: 2019-09-24 | End: 2019-09-24 | Stop reason: HOSPADM

## 2019-09-24 RX ORDER — OXYCODONE HYDROCHLORIDE 5 MG/1
5 TABLET ORAL
Status: DISCONTINUED | OUTPATIENT
Start: 2019-09-24 | End: 2019-09-24 | Stop reason: HOSPADM

## 2019-09-24 RX ORDER — ONDANSETRON 8 MG/1
8 TABLET, ORALLY DISINTEGRATING ORAL EVERY 8 HOURS PRN
Status: DISCONTINUED | OUTPATIENT
Start: 2019-09-24 | End: 2019-09-25 | Stop reason: HOSPADM

## 2019-09-24 RX ORDER — AMOXICILLIN 250 MG
1 CAPSULE ORAL 2 TIMES DAILY
Status: DISCONTINUED | OUTPATIENT
Start: 2019-09-24 | End: 2019-09-25 | Stop reason: HOSPADM

## 2019-09-24 RX ORDER — AMLODIPINE BESYLATE 5 MG/1
10 TABLET ORAL DAILY
Status: DISCONTINUED | OUTPATIENT
Start: 2019-09-24 | End: 2019-09-25 | Stop reason: HOSPADM

## 2019-09-24 RX ORDER — PRAVASTATIN SODIUM 40 MG/1
40 TABLET ORAL DAILY
Status: DISCONTINUED | OUTPATIENT
Start: 2019-09-24 | End: 2019-09-25 | Stop reason: HOSPADM

## 2019-09-24 RX ORDER — AMLODIPINE AND VALSARTAN 10; 320 MG/1; MG/1
1 TABLET ORAL DAILY
Status: DISCONTINUED | OUTPATIENT
Start: 2019-09-24 | End: 2019-09-24 | Stop reason: SDUPTHER

## 2019-09-24 RX ORDER — TIZANIDINE 4 MG/1
4 TABLET ORAL EVERY 8 HOURS
Status: DISCONTINUED | OUTPATIENT
Start: 2019-09-24 | End: 2019-09-25 | Stop reason: HOSPADM

## 2019-09-24 RX ORDER — SODIUM CHLORIDE 0.9 % (FLUSH) 0.9 %
10 SYRINGE (ML) INJECTION
Status: DISCONTINUED | OUTPATIENT
Start: 2019-09-24 | End: 2019-09-25 | Stop reason: HOSPADM

## 2019-09-24 RX ORDER — POTASSIUM CHLORIDE 20 MEQ/1
20 TABLET, EXTENDED RELEASE ORAL 2 TIMES DAILY
Status: DISCONTINUED | OUTPATIENT
Start: 2019-09-24 | End: 2019-09-25 | Stop reason: HOSPADM

## 2019-09-24 RX ORDER — CEFAZOLIN SODIUM 2 G/50ML
2 SOLUTION INTRAVENOUS
Status: COMPLETED | OUTPATIENT
Start: 2019-09-24 | End: 2019-09-24

## 2019-09-24 RX ORDER — MIDAZOLAM HYDROCHLORIDE 1 MG/ML
INJECTION, SOLUTION INTRAMUSCULAR; INTRAVENOUS
Status: DISCONTINUED | OUTPATIENT
Start: 2019-09-24 | End: 2019-09-24

## 2019-09-24 RX ORDER — SPIRONOLACTONE 25 MG/1
100 TABLET ORAL DAILY
Status: DISCONTINUED | OUTPATIENT
Start: 2019-09-24 | End: 2019-09-25 | Stop reason: HOSPADM

## 2019-09-24 RX ORDER — PROPOFOL 10 MG/ML
VIAL (ML) INTRAVENOUS
Status: DISCONTINUED | OUTPATIENT
Start: 2019-09-24 | End: 2019-09-24

## 2019-09-24 RX ORDER — MUPIROCIN 20 MG/G
OINTMENT TOPICAL
Status: DISCONTINUED | OUTPATIENT
Start: 2019-09-24 | End: 2019-09-24 | Stop reason: HOSPADM

## 2019-09-24 RX ORDER — TAMSULOSIN HYDROCHLORIDE 0.4 MG/1
0.8 CAPSULE ORAL NIGHTLY
Status: DISCONTINUED | OUTPATIENT
Start: 2019-09-24 | End: 2019-09-25 | Stop reason: HOSPADM

## 2019-09-24 RX ORDER — CEFAZOLIN SODIUM 2 G/50ML
2 SOLUTION INTRAVENOUS
Status: COMPLETED | OUTPATIENT
Start: 2019-09-24 | End: 2019-09-25

## 2019-09-24 RX ORDER — OXYCODONE HYDROCHLORIDE 10 MG/1
10 TABLET ORAL EVERY 4 HOURS PRN
Status: DISCONTINUED | OUTPATIENT
Start: 2019-09-24 | End: 2019-09-25 | Stop reason: HOSPADM

## 2019-09-24 RX ORDER — EPHEDRINE SULFATE 50 MG/ML
INJECTION, SOLUTION INTRAVENOUS
Status: DISCONTINUED | OUTPATIENT
Start: 2019-09-24 | End: 2019-09-24

## 2019-09-24 RX ORDER — PHENYLEPHRINE HYDROCHLORIDE 10 MG/ML
INJECTION INTRAVENOUS
Status: DISCONTINUED | OUTPATIENT
Start: 2019-09-24 | End: 2019-09-24

## 2019-09-24 RX ORDER — HYDROCODONE BITARTRATE AND ACETAMINOPHEN 5; 325 MG/1; MG/1
1 TABLET ORAL EVERY 4 HOURS PRN
Status: DISCONTINUED | OUTPATIENT
Start: 2019-09-24 | End: 2019-09-25 | Stop reason: HOSPADM

## 2019-09-24 RX ORDER — SODIUM CHLORIDE, SODIUM LACTATE, POTASSIUM CHLORIDE, CALCIUM CHLORIDE 600; 310; 30; 20 MG/100ML; MG/100ML; MG/100ML; MG/100ML
INJECTION, SOLUTION INTRAVENOUS CONTINUOUS
Status: DISCONTINUED | OUTPATIENT
Start: 2019-09-24 | End: 2019-09-24

## 2019-09-24 RX ORDER — SUCCINYLCHOLINE CHLORIDE 20 MG/ML
INJECTION INTRAMUSCULAR; INTRAVENOUS
Status: DISCONTINUED | OUTPATIENT
Start: 2019-09-24 | End: 2019-09-24

## 2019-09-24 RX ORDER — LIDOCAINE HYDROCHLORIDE 10 MG/ML
1 INJECTION, SOLUTION EPIDURAL; INFILTRATION; INTRACAUDAL; PERINEURAL ONCE
Status: COMPLETED | OUTPATIENT
Start: 2019-09-24 | End: 2019-09-24

## 2019-09-24 RX ORDER — CELECOXIB 100 MG/1
200 CAPSULE ORAL 2 TIMES DAILY
Status: DISCONTINUED | OUTPATIENT
Start: 2019-09-24 | End: 2019-09-25 | Stop reason: HOSPADM

## 2019-09-24 RX ORDER — LABETALOL 200 MG/1
200 TABLET, FILM COATED ORAL 2 TIMES DAILY
Status: DISCONTINUED | OUTPATIENT
Start: 2019-09-24 | End: 2019-09-25 | Stop reason: HOSPADM

## 2019-09-24 RX ORDER — CITALOPRAM 10 MG/1
20 TABLET ORAL DAILY
Status: DISCONTINUED | OUTPATIENT
Start: 2019-09-24 | End: 2019-09-25 | Stop reason: HOSPADM

## 2019-09-24 RX ORDER — METOCLOPRAMIDE HYDROCHLORIDE 5 MG/ML
10 INJECTION INTRAMUSCULAR; INTRAVENOUS EVERY 10 MIN PRN
Status: DISCONTINUED | OUTPATIENT
Start: 2019-09-24 | End: 2019-09-24 | Stop reason: HOSPADM

## 2019-09-24 RX ORDER — ACETAMINOPHEN 10 MG/ML
INJECTION, SOLUTION INTRAVENOUS
Status: DISCONTINUED | OUTPATIENT
Start: 2019-09-24 | End: 2019-09-24

## 2019-09-24 RX ORDER — LIDOCAINE HCL/PF 100 MG/5ML
SYRINGE (ML) INTRAVENOUS
Status: DISCONTINUED | OUTPATIENT
Start: 2019-09-24 | End: 2019-09-24

## 2019-09-24 RX ORDER — SODIUM CHLORIDE, SODIUM LACTATE, POTASSIUM CHLORIDE, CALCIUM CHLORIDE 600; 310; 30; 20 MG/100ML; MG/100ML; MG/100ML; MG/100ML
INJECTION, SOLUTION INTRAVENOUS CONTINUOUS
Status: DISCONTINUED | OUTPATIENT
Start: 2019-09-24 | End: 2019-09-25

## 2019-09-24 RX ORDER — ROCURONIUM BROMIDE 10 MG/ML
INJECTION, SOLUTION INTRAVENOUS
Status: DISCONTINUED | OUTPATIENT
Start: 2019-09-24 | End: 2019-09-24

## 2019-09-24 RX ORDER — LOPERAMIDE HYDROCHLORIDE 2 MG/1
2 CAPSULE ORAL CONTINUOUS PRN
Status: DISCONTINUED | OUTPATIENT
Start: 2019-09-24 | End: 2019-09-25

## 2019-09-24 RX ORDER — MEPERIDINE HYDROCHLORIDE 50 MG/ML
12.5 INJECTION INTRAMUSCULAR; INTRAVENOUS; SUBCUTANEOUS ONCE AS NEEDED
Status: DISCONTINUED | OUTPATIENT
Start: 2019-09-24 | End: 2019-09-24 | Stop reason: HOSPADM

## 2019-09-24 RX ADMIN — FENTANYL CITRATE 50 MCG: 50 INJECTION, SOLUTION INTRAMUSCULAR; INTRAVENOUS at 10:09

## 2019-09-24 RX ADMIN — FENTANYL CITRATE 100 MCG: 50 INJECTION, SOLUTION INTRAMUSCULAR; INTRAVENOUS at 07:09

## 2019-09-24 RX ADMIN — ACETAMINOPHEN 1000 MG: 10 INJECTION, SOLUTION INTRAVENOUS at 11:09

## 2019-09-24 RX ADMIN — LABETALOL HYDROCHLORIDE 200 MG: 200 TABLET, FILM COATED ORAL at 09:09

## 2019-09-24 RX ADMIN — GLYCOPYRROLATE 0.2 MG: 0.2 INJECTION, SOLUTION INTRAMUSCULAR; INTRAVENOUS at 10:09

## 2019-09-24 RX ADMIN — ONDANSETRON 4 MG: 2 INJECTION, SOLUTION INTRAMUSCULAR; INTRAVENOUS at 10:09

## 2019-09-24 RX ADMIN — CEFAZOLIN SODIUM 2 G: 2 SOLUTION INTRAVENOUS at 10:09

## 2019-09-24 RX ADMIN — SENNOSIDES, DOCUSATE SODIUM 1 TABLET: 50; 8.6 TABLET, FILM COATED ORAL at 09:09

## 2019-09-24 RX ADMIN — MIDAZOLAM 2 MG: 1 INJECTION INTRAMUSCULAR; INTRAVENOUS at 07:09

## 2019-09-24 RX ADMIN — DEXAMETHASONE SODIUM PHOSPHATE 4 MG: 4 INJECTION, SOLUTION INTRAMUSCULAR; INTRAVENOUS at 10:09

## 2019-09-24 RX ADMIN — GLYCOPYRROLATE 0.2 MG: 0.2 INJECTION, SOLUTION INTRAMUSCULAR; INTRAVENOUS at 11:09

## 2019-09-24 RX ADMIN — PHENYLEPHRINE HYDROCHLORIDE 100 MCG: 10 INJECTION INTRAVENOUS at 11:09

## 2019-09-24 RX ADMIN — SODIUM CHLORIDE, SODIUM LACTATE, POTASSIUM CHLORIDE, AND CALCIUM CHLORIDE: .6; .31; .03; .02 INJECTION, SOLUTION INTRAVENOUS at 12:09

## 2019-09-24 RX ADMIN — SPIRONOLACTONE 100 MG: 25 TABLET ORAL at 03:09

## 2019-09-24 RX ADMIN — EPHEDRINE SULFATE 25 MG: 50 INJECTION, SOLUTION INTRAMUSCULAR; INTRAVENOUS; SUBCUTANEOUS at 10:09

## 2019-09-24 RX ADMIN — ROCURONIUM BROMIDE 30 MG: 10 INJECTION, SOLUTION INTRAVENOUS at 10:09

## 2019-09-24 RX ADMIN — OXYCODONE HYDROCHLORIDE 10 MG: 10 TABLET ORAL at 03:09

## 2019-09-24 RX ADMIN — TAMSULOSIN HYDROCHLORIDE 0.8 MG: 0.4 CAPSULE ORAL at 09:09

## 2019-09-24 RX ADMIN — LIDOCAINE HYDROCHLORIDE: 10 INJECTION, SOLUTION EPIDURAL; INFILTRATION; INTRACAUDAL; PERINEURAL at 06:09

## 2019-09-24 RX ADMIN — POTASSIUM CHLORIDE 20 MEQ: 20 TABLET, EXTENDED RELEASE ORAL at 09:09

## 2019-09-24 RX ADMIN — CEFAZOLIN SODIUM 2 G: 2 SOLUTION INTRAVENOUS at 07:09

## 2019-09-24 RX ADMIN — MUPIROCIN 1 G: 20 OINTMENT TOPICAL at 09:09

## 2019-09-24 RX ADMIN — PHENYLEPHRINE HYDROCHLORIDE 100 MCG: 10 INJECTION INTRAVENOUS at 12:09

## 2019-09-24 RX ADMIN — SUCCINYLCHOLINE CHLORIDE 140 MG: 20 INJECTION, SOLUTION INTRAMUSCULAR; INTRAVENOUS at 10:09

## 2019-09-24 RX ADMIN — AMLODIPINE BESYLATE 10 MG: 5 TABLET ORAL at 03:09

## 2019-09-24 RX ADMIN — VALSARTAN 320 MG: 80 TABLET ORAL at 03:09

## 2019-09-24 RX ADMIN — SUGAMMADEX 250 MG: 100 INJECTION, SOLUTION INTRAVENOUS at 12:09

## 2019-09-24 RX ADMIN — ROCURONIUM BROMIDE 10 MG: 10 INJECTION, SOLUTION INTRAVENOUS at 10:09

## 2019-09-24 RX ADMIN — CELECOXIB 200 MG: 100 CAPSULE ORAL at 09:09

## 2019-09-24 RX ADMIN — SODIUM CHLORIDE, SODIUM LACTATE, POTASSIUM CHLORIDE, AND CALCIUM CHLORIDE: .6; .31; .03; .02 INJECTION, SOLUTION INTRAVENOUS at 06:09

## 2019-09-24 RX ADMIN — LIDOCAINE HYDROCHLORIDE 100 MG: 20 INJECTION, SOLUTION INTRAVENOUS at 10:09

## 2019-09-24 RX ADMIN — CELECOXIB 200 MG: 100 CAPSULE ORAL at 03:09

## 2019-09-24 RX ADMIN — SODIUM CHLORIDE, SODIUM LACTATE, POTASSIUM CHLORIDE, AND CALCIUM CHLORIDE: .6; .31; .03; .02 INJECTION, SOLUTION INTRAVENOUS at 10:09

## 2019-09-24 RX ADMIN — PROPOFOL 180 MG: 10 INJECTION, EMULSION INTRAVENOUS at 10:09

## 2019-09-24 RX ADMIN — OXYCODONE HYDROCHLORIDE 10 MG: 10 TABLET ORAL at 09:09

## 2019-09-24 RX ADMIN — ASPIRIN 325 MG ORAL TABLET 325 MG: 325 PILL ORAL at 03:09

## 2019-09-24 RX ADMIN — TIZANIDINE 4 MG: 4 TABLET ORAL at 09:09

## 2019-09-24 RX ADMIN — GLYCOPYRROLATE 0.2 MG: 0.2 INJECTION, SOLUTION INTRAMUSCULAR; INTRAVENOUS at 12:09

## 2019-09-24 RX ADMIN — BUPIVACAINE HYDROCHLORIDE 5 ML: 5 INJECTION, SOLUTION EPIDURAL; INTRACAUDAL; PERINEURAL at 07:09

## 2019-09-24 RX ADMIN — MIDAZOLAM 2 MG: 1 INJECTION INTRAMUSCULAR; INTRAVENOUS at 10:09

## 2019-09-24 NOTE — H&P
[facility]  History & Physical    Patient Name: Sammy Muinz  MRN: [id number]  Admission Date: 9/24/2019  Attending Physician: Darrell Conteh MD   Primary Care Provider: Phill Balderrama MD    Patient information was obtained from records available    Subjective:     Principal Problem:Degenerative arthritis of left shoulder region, status post left shoulder arthroplasty    Chief Complaint:  Osteoarthritis of the left shoulder     HPI: This is a 68-year-old male who has a history of hypertension, sleep apnea, renal calculi, bladder tumor, and left shoulder osteoarthritis.  Today patient underwent a left total shoulder arthroplasty done by Dr. Miller.  Patient tolerated procedure well is currently post procedure.    Past Medical History:   Diagnosis Date    Arthritis     left knee; right shoulder    Bladder tumor     BPH (benign prostatic hyperplasia)     Hypertension     Sleep apnea     Doews not use C-pap       Past Surgical History:   Procedure Laterality Date    CERVICAL FUSION      CYSTOSCOPY      GASTRIC BYPASS  2012    HERNIA REPAIR      ventral hernia    JOINT REPLACEMENT      Bilateral knees & Rt shoulder    PROSTATE SURGERY      greenlight laser    TONSILLECTOMY      TOTAL KNEE ARTHROPLASTY  left    TOTAL SHOULDER ARTHROPLASTY  right    TYMPANOPLASTY  left    VASECTOMY         Review of patient's allergies indicates:   Allergen Reactions    Iodinated contrast media Itching       No current facility-administered medications on file prior to encounter.      Current Outpatient Medications on File Prior to Encounter   Medication Sig    amlodipine-valsartan (EXFORGE)  mg per tablet Take 1 tablet by mouth once daily.     citalopram (CELEXA) 20 MG tablet Take 20 mg by mouth once daily.     labetalol (NORMODYNE) 200 MG tablet Take 200 mg by mouth 2 (two) times daily. Twice a day    potassium chloride SA (K-DUR,KLOR-CON) 20 MEQ tablet Take 20 mEq by  mouth 2 (two) times daily.     pravastatin (PRAVACHOL) 40 MG tablet Take 40 mg by mouth once daily. Every morning    spironolactone (ALDACTONE) 100 MG tablet Take 100 mg by mouth once daily.     tamsulosin (FLOMAX) 0.4 mg Cp24 Take 2 capsules (0.8 mg total) by mouth every evening. Every day    celecoxib (CELEBREX) 100 MG capsule Take 100 mg by mouth once daily.     fish oil-dha-epa (FISH OIL) 1,200-144-216 mg Cap Take 1 capsule by mouth 2 (two) times daily.     tiZANidine (ZANAFLEX) 4 MG tablet TAKE 1 TABLET(4 MG) BY MOUTH TWICE DAILY     Family History     Problem Relation (Age of Onset)    Arthritis Mother    Benign prostatic hyperplasia Brother    Cancer Father (75)    Diabetes Father    Heart disease Mother, Father    Hypertension Father    Kidney disease Mother    Mental illness Father (84)        Tobacco Use    Smoking status: Never Smoker    Smokeless tobacco: Never Used   Substance and Sexual Activity    Alcohol use: No    Drug use: No    Sexual activity: Not on file     Review of Systems   Constitutional: Negative for chills, fatigue and fever.   HENT: Negative for ear pain and facial swelling.    Eyes: Negative for pain and redness.   Respiratory: Negative for cough and shortness of breath.    Gastrointestinal: Negative for abdominal pain, diarrhea and vomiting.   Endocrine: Negative for polydipsia and polyphagia.   Genitourinary: Negative for difficulty urinating, flank pain and hematuria.   Musculoskeletal: Negative for neck pain and neck stiffness.   Skin: Positive for wound.        Left shoulder surgical dressing intact   Allergic/Immunologic: Negative for food allergies.   Neurological: Negative for facial asymmetry, speech difficulty and weakness.   Hematological: Does not bruise/bleed easily.   Psychiatric/Behavioral: Negative for agitation, behavioral problems, confusion, hallucinations and suicidal ideas. The patient is not nervous/anxious.      Objective:     Vital Signs (Most  Recent):  Temp: 97.2 °F (36.2 °C) (09/24/19 1711)  Pulse: 71 (09/24/19 1711)  Resp: 18 (09/24/19 1711)  BP: 114/61 (09/24/19 1711)  SpO2: (!) 93 % (09/24/19 1711) Vital Signs (24h Range):  Temp:  [97.2 °F (36.2 °C)-98.8 °F (37.1 °C)] 97.2 °F (36.2 °C)  Pulse:  [61-78] 71  Resp:  [12-20] 18  SpO2:  [91 %-97 %] 93 %  BP: ()/(59-79) 114/61     Weight: 112.5 kg (248 lb)  Body mass index is 34.59 kg/m².    Physical Exam   Constitutional: He is oriented to person, place, and time. He appears well-developed and well-nourished. No distress.   HENT:   Head: Normocephalic and atraumatic.   Eyes: Pupils are equal, round, and reactive to light. Conjunctivae and EOM are normal. Right eye exhibits no discharge. Left eye exhibits no discharge.   Neck: Normal range of motion. Neck supple. No JVD present.   Cardiovascular: Normal rate, regular rhythm, normal heart sounds and intact distal pulses.   No murmur heard.  Pulmonary/Chest: Effort normal and breath sounds normal. No respiratory distress. He has no wheezes.   Abdominal: Soft. Bowel sounds are normal. He exhibits no distension. There is no tenderness. There is no guarding.   Genitourinary:   Genitourinary Comments: Not examined   Musculoskeletal:   Left upper extremity and range of motion not tested  Left upper extremity immobilizer in place   Neurological: He is alert and oriented to person, place, and time. No cranial nerve deficit.   Skin: Skin is warm and dry. Capillary refill takes less than 2 seconds. He is not diaphoretic.   Left shoulder surgical dressing intact with COLE drain noted   Psychiatric: He has a normal mood and affect. His behavior is normal. Judgment and thought content normal.         CRANIAL NERVES     CN III, IV, VI   Pupils are equal, round, and reactive to light.  Extraocular motions are normal.     Preop lab noted    Assessment/Plan:     Hyperlipidemia  Continue home medication      BPH (benign prostatic hyperplasia)  Continue home  medication      Osteoarthritis of left shoulder  09/24/2019 status post left shoulder arthroplasty-  Orders as per surgeon  Physical therapy and occupational therapy consulted  P.r.n. pain medication  Fall precautions      Sleep apnea  Monitor O2 sats  Okay to use home equipment      Essential hypertension  Continue home medication        VTE Risk Mitigation (From admission, onward)         Ordered     Place JW hose  Until discontinued      09/24/19 1400     Place sequential compression device  Until discontinued      09/24/19 1400               Time spent seeing patient( greater than 1/2 spent in direct contact) : 56 minutes    AMY Hancock  Department of Hospital Medicine   [facility]

## 2019-09-24 NOTE — PLAN OF CARE
Attempted to meet with pt to complete his assessment.  Nursing in room with pt.  CM will follow up with pt later.      09/24/19 6235   Discharge Assessment   Assessment Type Discharge Planning Assessment

## 2019-09-24 NOTE — OP NOTE
DATE OF PROCEDURE:  09/24/2019    PROCEDURE:  Total shoulder arthroplasty, left.    PREOPERATIVE DIAGNOSIS:  Osteoarthritis, left knee.    POSTOPERATIVE DIAGNOSIS:  Osteoarthritis, left shoulder.    ANESTHESIA:  General.    SURGEON:  Abad Miller Jr., M.D.    ASSISTANT:  Caitlin Machado.    PROCEDURE IN DETAIL:  The patient was taken to the Operating Room and placed   under general anesthesia.  He was then positioned in modified rollins chair   position, the left shoulder out free for manipulation and prepped and draped the   left shoulder in satisfactory manner.  We then made a deltopectoral incision,   left shoulder, went through skin and subQ, went through the deltopectoral   interval and then put our deltoid retractor under the deltoid.  A good   musculature identified, the rotator cuff was still intact.  Once we identified   it, we then opened the rotator interval and then peeled back the subscapularis   and the biceps from the bicipital groove and the lesser tuberosity and opened   the inferior interval of the subscap.  That way we could then entered the joint   and pull the subscap out of the way.  This exposed our humeral head.  We then   put an external cutting guide, made our proximal humeral cut at 30 degrees of   version, cut off the excess osteophytes inferior particularly and then with a   good exposure, we then began reaming our proximal humerus.  We reamed up to size   10 using a Biomet small straight stem size 10 broach.  All fit well.  We made   sure we had good rotation.  We left the broach in.  We then worked our way to   the glenoid elevating soft tissues around the glenoid until we had a good look   at the glenoid.  We then put our glenoid guide on, cheated for a little bit more   anteversion because there was some retroversion, but not severe in this glenoid   known from preop CT.  So we got the glenoid position right, put our guide pin   in, we then reamed our glenoid.  It was somewhere between  medium and large.  We   ended up with a medium size implant on the glenoid.  Bone stock was very good.    We reamed that.  We then reamed our center pegs for a Biomet system.  We then   put our 3 peripheral pegs.  All that went very well and the bone stock looked   very good.  So we then irrigated thoroughly.  We mixed some cement, cemented in   the 3 pegs, but not the center peg which is a Pressfit on our medium size   glenoid component.  All that fit in well.  We then went back to our humeral   head, sizing it between a 50 and a 46, because I had good rotator cuff to bring   around.  I downsize the 46, so we could easily get the rotator cuff put back in   place.  So, we ended with a 46 x 21 humeral head.  Cleaning up any phytes around   the edges.  There was an offset needed.  We then pull out our trials,   irrigated, put in a Pressfit stem size 10, fit very well and then put on our 46   x 18 humeral head with offset.  That fit nicely.  Prior to putting the stem in,   we did drill 4 holes through our tuberosity region and passed our 4 sutures to   replace the subscap and any remaining rotator cuff tissue.  Once everything was   in place and was happy with all our position, etc., we then closed our anterior   rotator interval with our Ti-Cron sutures, closed the interval itself with   Vicryl.  A rotator cuff came back into position nicely, looked good and tight   and I thought the overall position and mobility of the shoulder was reasonable.    We then closed our deltopectoral interval with #1 Vicryl.  We left a drain in   that and brought through a separate stab wound, closed subQ with 2-0 Vicryl and   closed the skin with running subcuticular Monocryl and Steri-Strips.  He was   then dressed, placed in simple sling, taken to Recovery in satisfactory   condition.  Blood loss maybe 250.      GRACIE/THADDEUS  dd: 09/24/2019 12:26:48 (CDT)  td: 09/24/2019 14:41:39 (CDT)  Doc ID   #[id number]  Job ID #[id number]    CC:

## 2019-09-24 NOTE — PLAN OF CARE
Patient aao x 4. Complains of L shoulder pain, controlled with oral prn medication. Plan of care reviewed with patient, verbalized understanding. Dressing clean dry and intact. Arm sling in place. COLE drain intact. Neurovascular checks, L radial pulse +2. Cryotherapy on. Tolerating diet. Bed in lowest position and call light within reach.

## 2019-09-24 NOTE — PLAN OF CARE
Pain 1/10 weak in l fingers co of numbness from block awake oriented skin w+d dr Martin released from pacu to room 422 via bed arm in immoblizer ice on shoulder ice man on and running + radial pulse Due to void  chi sips and chips no nausea no emesis

## 2019-09-24 NOTE — TRANSFER OF CARE
"Anesthesia Transfer of Care Note    Patient: Sammy Muniz    Procedure(s) Performed: Procedure(s) (LRB):  ARTHROPLASTY, SHOULDER (Left)    Patient location: PACU    Anesthesia Type: general    Transport from OR: Transported from OR on 2-3 L/min O2 by NC with adequate spontaneous ventilation    Post pain: adequate analgesia    Post assessment: no apparent anesthetic complications and tolerated procedure well    Post vital signs: stable    Level of consciousness: sedated and responds to stimulation    Nausea/Vomiting: no nausea/vomiting    Complications: none    Transfer of care protocol was followed      Last vitals:   Visit Vitals  /69   Pulse 61   Temp 37.1 °C (98.8 °F) (Skin)   Resp 16   Ht 5' 11" (1.803 m)   Wt 112.5 kg (248 lb)   SpO2 (!) 94%   BMI 34.59 kg/m²     "

## 2019-09-24 NOTE — ASSESSMENT & PLAN NOTE
09/24/2019 status post left shoulder arthroplasty-  Orders as per surgeon  Physical therapy and occupational therapy consulted  P.r.n. pain medication  Fall precautions

## 2019-09-24 NOTE — ANESTHESIA PROCEDURE NOTES
Left Interscalene Nerve Block    Patient location during procedure: pre-op   Block not for primary anesthetic.  Reason for block: at surgeon's request and post-op pain management   Post-op Pain Location: Left shoulder Pain  Start time: 9/24/2019 7:18 AM  Timeout: 9/24/2019 7:18 AM   End time: 9/24/2019 7:23 AM    Staffing  Authorizing Provider: Alonso Barajas MD  Performing Provider: Alonso Barajas MD    Preanesthetic Checklist  Completed: patient identified, site marked, surgical consent, pre-op evaluation, timeout performed, IV checked, risks and benefits discussed and monitors and equipment checked  Peripheral Block  Patient position: supine  Prep: ChloraPrep  Patient monitoring: heart rate, cardiac monitor, continuous pulse ox, continuous capnometry and frequent blood pressure checks  Block type: interscalene  Laterality: left  Injection technique: single shot  Needle  Needle type: Stimuplex   Needle gauge: 22 G  Needle length: 2 in  Needle localization: anatomical landmarks and ultrasound guidance  Needle insertion depth: 4 cm   -ultrasound image captured on disc.  Assessment  Injection assessment: negative aspiration, negative parasthesia and local visualized surrounding nerve  Paresthesia pain: none  Heart rate change: no  Slow fractionated injection: yes  Additional Notes  VSS.  [facility] RN monitoring vitals throughout procedure.  Patient tolerated procedure well.     Exparel 10ml dosed under US guidance.

## 2019-09-24 NOTE — ANESTHESIA PREPROCEDURE EVALUATION
09/24/2019  Sammy Muniz is a 68 y.o., male.    Anesthesia Evaluation    I have reviewed the Patient Summary Reports.    I have reviewed the Nursing Notes.   I have reviewed the Medications.     Review of Systems  Anesthesia Hx:  No problems with previous Anesthesia    Social:  Non-Smoker    Cardiovascular:   Hypertension, well controlled    Pulmonary:   Sleep Apnea    Renal/:   renal calculi Bladder tumor   Musculoskeletal:   Arthritis     Neurological:  Neurology Normal    Endocrine:  Endocrine Normal        Physical Exam  General:  Well nourished, Obesity    Airway/Jaw/Neck:  Airway Findings: Mouth Opening: Normal Tongue: Normal  General Airway Assessment: Adult  Oropharynx Findings:  Mallampati: II  Jaw/Neck Findings:  Neck ROM: Normal ROM     Eyes/Ears/Nose:  Eyes/Ears/Nose Findings:    Dental:  Dental Findings:   Chest/Lungs:  Chest/Lungs Findings: Normal Respiratory Rate     Heart/Vascular:  Heart Findings: Rate: Normal  Rhythm: Regular Rhythm        Mental Status:  Mental Status Findings:  Cooperative, Alert and Oriented         Anesthesia Plan  Type of Anesthesia, risks & benefits discussed:  Anesthesia Type:  general  Patient's Preference:   Intra-op Monitoring Plan: standard ASA monitors  Intra-op Monitoring Plan Comments:   Post Op Pain Control Plan: multimodal analgesia  Post Op Pain Control Plan Comments:   Induction:   IV  Beta Blocker:  Patient is not currently on a Beta-Blocker (No further documentation required).       Informed Consent: Patient understands risks and agrees with Anesthesia plan.  Questions answered. Anesthesia consent signed with patient.  ASA Score: 3     Day of Surgery Review of History & Physical:  There are no significant changes.   H&P completed by Anesthesiologist.       Ready For Surgery From Anesthesia Perspective.

## 2019-09-24 NOTE — SUBJECTIVE & OBJECTIVE
Past Medical History:   Diagnosis Date    Arthritis     left knee; right shoulder    Bladder tumor     BPH (benign prostatic hyperplasia)     Hypertension     Sleep apnea     Doews not use C-pap       Past Surgical History:   Procedure Laterality Date    CERVICAL FUSION      CYSTOSCOPY      GASTRIC BYPASS  2012    HERNIA REPAIR      ventral hernia    JOINT REPLACEMENT      Bilateral knees & Rt shoulder    PROSTATE SURGERY      greenlight laser    TONSILLECTOMY      TOTAL KNEE ARTHROPLASTY  left    TOTAL SHOULDER ARTHROPLASTY  right    TYMPANOPLASTY  left    VASECTOMY         Review of patient's allergies indicates:   Allergen Reactions    Iodinated contrast media Itching       No current facility-administered medications on file prior to encounter.      Current Outpatient Medications on File Prior to Encounter   Medication Sig    amlodipine-valsartan (EXFORGE)  mg per tablet Take 1 tablet by mouth once daily.     citalopram (CELEXA) 20 MG tablet Take 20 mg by mouth once daily.     labetalol (NORMODYNE) 200 MG tablet Take 200 mg by mouth 2 (two) times daily. Twice a day    potassium chloride SA (K-DUR,KLOR-CON) 20 MEQ tablet Take 20 mEq by mouth 2 (two) times daily.     pravastatin (PRAVACHOL) 40 MG tablet Take 40 mg by mouth once daily. Every morning    spironolactone (ALDACTONE) 100 MG tablet Take 100 mg by mouth once daily.     tamsulosin (FLOMAX) 0.4 mg Cp24 Take 2 capsules (0.8 mg total) by mouth every evening. Every day    celecoxib (CELEBREX) 100 MG capsule Take 100 mg by mouth once daily.     fish oil-dha-epa (FISH OIL) 1,200-144-216 mg Cap Take 1 capsule by mouth 2 (two) times daily.     tiZANidine (ZANAFLEX) 4 MG tablet TAKE 1 TABLET(4 MG) BY MOUTH TWICE DAILY     Family History     Problem Relation (Age of Onset)    Arthritis Mother    Benign prostatic hyperplasia Brother    Cancer Father (75)    Diabetes Father    Heart disease Mother, Father    Hypertension Father     Kidney disease Mother    Mental illness Father (84)        Tobacco Use    Smoking status: Never Smoker    Smokeless tobacco: Never Used   Substance and Sexual Activity    Alcohol use: No    Drug use: No    Sexual activity: Not on file     Review of Systems   Constitutional: Negative for chills, fatigue and fever.   HENT: Negative for ear pain and facial swelling.    Eyes: Negative for pain and redness.   Respiratory: Negative for cough and shortness of breath.    Gastrointestinal: Negative for abdominal pain, diarrhea and vomiting.   Endocrine: Negative for polydipsia and polyphagia.   Genitourinary: Negative for difficulty urinating, flank pain and hematuria.   Musculoskeletal: Negative for neck pain and neck stiffness.   Skin: Positive for wound.        Left shoulder surgical dressing intact   Allergic/Immunologic: Negative for food allergies.   Neurological: Negative for facial asymmetry, speech difficulty and weakness.   Hematological: Does not bruise/bleed easily.   Psychiatric/Behavioral: Negative for agitation, behavioral problems, confusion, hallucinations and suicidal ideas. The patient is not nervous/anxious.      Objective:     Vital Signs (Most Recent):  Temp: 97.2 °F (36.2 °C) (09/24/19 1711)  Pulse: 71 (09/24/19 1711)  Resp: 18 (09/24/19 1711)  BP: 114/61 (09/24/19 1711)  SpO2: (!) 93 % (09/24/19 1711) Vital Signs (24h Range):  Temp:  [97.2 °F (36.2 °C)-98.8 °F (37.1 °C)] 97.2 °F (36.2 °C)  Pulse:  [61-78] 71  Resp:  [12-20] 18  SpO2:  [91 %-97 %] 93 %  BP: ()/(59-79) 114/61     Weight: 112.5 kg (248 lb)  Body mass index is 34.59 kg/m².    Physical Exam   Constitutional: He is oriented to person, place, and time. He appears well-developed and well-nourished. No distress.   HENT:   Head: Normocephalic and atraumatic.   Eyes: Pupils are equal, round, and reactive to light. Conjunctivae and EOM are normal. Right eye exhibits no discharge. Left eye exhibits no discharge.   Neck: Normal range of  motion. Neck supple. No JVD present.   Cardiovascular: Normal rate, regular rhythm, normal heart sounds and intact distal pulses.   No murmur heard.  Pulmonary/Chest: Effort normal and breath sounds normal. No respiratory distress. He has no wheezes.   Abdominal: Soft. Bowel sounds are normal. He exhibits no distension. There is no tenderness. There is no guarding.   Genitourinary:   Genitourinary Comments: Not examined   Musculoskeletal:   Left upper extremity and range of motion not tested  Left upper extremity immobilizer in place   Neurological: He is alert and oriented to person, place, and time. No cranial nerve deficit.   Skin: Skin is warm and dry. Capillary refill takes less than 2 seconds. He is not diaphoretic.   Left shoulder surgical dressing intact with COLE drain noted   Psychiatric: He has a normal mood and affect. His behavior is normal. Judgment and thought content normal.         CRANIAL NERVES     CN III, IV, VI   Pupils are equal, round, and reactive to light.  Extraocular motions are normal.     Preop lab noted

## 2019-09-24 NOTE — BRIEF OP NOTE
[facility]  Brief Operative Note    SUMMARY     Surgery Date: 9/24/2019     Surgeon(s) and Role:     * Abad Miller Jr., MD - Primary    Assisting Surgeon: None    Pre-op Diagnosis:  Primary osteoarthritis of left shoulder [M19.012]    Post-op Diagnosis:  Post-Op Diagnosis Codes:     * Primary osteoarthritis of left shoulder [M19.012]    Procedure(s) (LRB):  ARTHROPLASTY, SHOULDER (Left)    Anesthesia: General    Description of the findings of the procedure:tsa left biomet  Estimated Blood Loss: 250* No values recorded between 9/24/2019 10:38 AM and 9/24/2019 12:21 PM *         Specimens:   Specimen (12h ago, onward)    None

## 2019-09-24 NOTE — HPI
This is a 68-year-old male who has a history of hypertension, sleep apnea, renal calculi, bladder tumor, and left shoulder osteoarthritis.  Today patient underwent a left total shoulder arthroplasty done by Dr. Miller.  Patient tolerated procedure well is currently post procedure.

## 2019-09-24 NOTE — ANESTHESIA POSTPROCEDURE EVALUATION
Anesthesia Post Evaluation    Patient: Sammy Muniz    Procedure(s) Performed: Procedure(s) (LRB):  ARTHROPLASTY, SHOULDER (Left)    Final Anesthesia Type: general  Patient location during evaluation: PACU  Patient participation: Yes- Able to Participate  Level of consciousness: awake and alert  Post-procedure vital signs: reviewed and stable  Pain management: adequate  Airway patency: patent  PONV status at discharge: No PONV  Anesthetic complications: no      Cardiovascular status: blood pressure returned to baseline  Respiratory status: unassisted  Hydration status: euvolemic  Follow-up not needed.          Vitals Value Taken Time   /79 9/24/2019  1:53 PM   Temp 36.6 °C (97.9 °F) 9/24/2019  1:53 PM   Pulse 65 9/24/2019  2:30 PM   Resp 16 9/24/2019  2:30 PM   SpO2 93 % 9/24/2019  2:30 PM         Event Time     Out of Recovery 14:00:00          Pain/Joel Score: Pain Rating Prior to Med Admin: 7 (9/24/2019  3:39 PM)  Joel Score: 9 (9/24/2019  1:45 PM)

## 2019-09-24 NOTE — PLAN OF CARE
Met with pt to complete his assessment.  Pt's wife was in the room.  Pt, who is independent with his self care, denies having any DME or home health services and lives with his wife.  Pt's PCP is Dr. Balderrama and insurance is [facility].  Pt's discharge disposition is home with no anticipated needs.      09/24/19 1635   Discharge Assessment   Assessment Type Discharge Planning Assessment   Confirmed/corrected address and phone number on facesheet? Yes   Assessment information obtained from? Patient   Communicated expected length of stay with patient/caregiver yes   Prior to hospitilization cognitive status: Alert/Oriented   Prior to hospitalization functional status: Independent   Current Functional Status: Independent   Lives With spouse   Able to Return to Prior Arrangements yes   Is patient able to care for self after discharge? Yes   Who are your caregiver(s) and their phone number(s)?   (spouse Jesenia Muniz, [phone])   Patient's perception of discharge disposition home or selfcare   Readmission Within the Last 30 Days no previous admission in last 30 days   Patient currently being followed by outpatient case management? No   Patient currently receives any other outside agency services? No   Equipment Currently Used at Home none   Do you have any problems affording any of your prescribed medications? No  (pharmacy is [facility] on [address])   Is the patient taking medications as prescribed? yes   Does the patient have transportation home? Yes   Transportation Anticipated family or friend will provide   Does the patient receive services at the [facility]? No   Discharge Plan A Home with family   DME Needed Upon Discharge  none   Patient/Family in Agreement with Plan yes

## 2019-09-24 NOTE — OR NURSING
Pt returned to [facility]. Waiting for rep to arrive. Pt on O2 @2L and monitored as given versed upon initially rolling.

## 2019-09-25 ENCOUNTER — TELEPHONE (OUTPATIENT)
Dept: MEDSURG UNIT | Facility: HOSPITAL | Age: 69
End: 2019-09-25

## 2019-09-25 LAB
ANION GAP SERPL CALC-SCNC: 9 MMOL/L (ref 8–16)
BASOPHILS # BLD AUTO: 0.01 K/UL (ref 0–0.2)
BASOPHILS NFR BLD: 0.1 % (ref 0–1.9)
BUN SERPL-MCNC: 26 MG/DL (ref 8–23)
CALCIUM SERPL-MCNC: 8.6 MG/DL (ref 8.7–10.5)
CHLORIDE SERPL-SCNC: 102 MMOL/L (ref 95–110)
CO2 SERPL-SCNC: 26 MMOL/L (ref 23–29)
CREAT SERPL-MCNC: 1.2 MG/DL (ref 0.5–1.4)
DIFFERENTIAL METHOD: ABNORMAL
EOSINOPHIL # BLD AUTO: 0 K/UL (ref 0–0.5)
EOSINOPHIL NFR BLD: 0 % (ref 0–8)
ERYTHROCYTE [DISTWIDTH] IN BLOOD BY AUTOMATED COUNT: 12.1 % (ref 11.5–14.5)
EST. GFR  (AFRICAN AMERICAN): >60 ML/MIN/1.73 M^2
EST. GFR  (NON AFRICAN AMERICAN): >60 ML/MIN/1.73 M^2
GLUCOSE SERPL-MCNC: 128 MG/DL (ref 70–110)
HCT VFR BLD AUTO: 34.6 % (ref 40–54)
HGB BLD-MCNC: 11.4 G/DL (ref 14–18)
IMM GRANULOCYTES # BLD AUTO: 0.03 K/UL (ref 0–0.04)
LYMPHOCYTES # BLD AUTO: 0.9 K/UL (ref 1–4.8)
LYMPHOCYTES NFR BLD: 8.3 % (ref 18–48)
MCH RBC QN AUTO: 31.3 PG (ref 27–31)
MCHC RBC AUTO-ENTMCNC: 32.9 G/DL (ref 32–36)
MCV RBC AUTO: 95 FL (ref 82–98)
MONOCYTES # BLD AUTO: 0.6 K/UL (ref 0.3–1)
MONOCYTES NFR BLD: 5.7 % (ref 4–15)
NEUTROPHILS # BLD AUTO: 8.7 K/UL (ref 1.8–7.7)
NEUTROPHILS NFR BLD: 85.6 % (ref 38–73)
NRBC BLD-RTO: 0 /100 WBC
PLATELET # BLD AUTO: 213 K/UL (ref 150–350)
PMV BLD AUTO: 10.2 FL (ref 9.2–12.9)
POTASSIUM SERPL-SCNC: 4.6 MMOL/L (ref 3.5–5.1)
RBC # BLD AUTO: 3.64 M/UL (ref 4.6–6.2)
SODIUM SERPL-SCNC: 137 MMOL/L (ref 136–145)
WBC # BLD AUTO: 10.21 K/UL (ref 3.9–12.7)

## 2019-09-25 PROCEDURE — G8988 SELF CARE GOAL STATUS: HCPCS | Mod: CI

## 2019-09-25 PROCEDURE — 94799 UNLISTED PULMONARY SVC/PX: CPT

## 2019-09-25 PROCEDURE — 80048 BASIC METABOLIC PNL TOTAL CA: CPT

## 2019-09-25 PROCEDURE — 97535 SELF CARE MNGMENT TRAINING: CPT

## 2019-09-25 PROCEDURE — 27000221 HC OXYGEN, UP TO 24 HOURS

## 2019-09-25 PROCEDURE — 94761 N-INVAS EAR/PLS OXIMETRY MLT: CPT

## 2019-09-25 PROCEDURE — 25000003 PHARM REV CODE 250: Performed by: ORTHOPAEDIC SURGERY

## 2019-09-25 PROCEDURE — G8989 SELF CARE D/C STATUS: HCPCS | Mod: CI

## 2019-09-25 PROCEDURE — 63600175 PHARM REV CODE 636 W HCPCS: Performed by: ORTHOPAEDIC SURGERY

## 2019-09-25 PROCEDURE — 97165 OT EVAL LOW COMPLEX 30 MIN: CPT

## 2019-09-25 PROCEDURE — 85025 COMPLETE CBC W/AUTO DIFF WBC: CPT

## 2019-09-25 PROCEDURE — G8987 SELF CARE CURRENT STATUS: HCPCS | Mod: CI

## 2019-09-25 PROCEDURE — 36415 COLL VENOUS BLD VENIPUNCTURE: CPT

## 2019-09-25 RX ORDER — AMOXICILLIN 250 MG
1 CAPSULE ORAL 2 TIMES DAILY
COMMUNITY
Start: 2019-09-25 | End: 2020-02-17

## 2019-09-25 RX ORDER — ASPIRIN 325 MG
325 TABLET ORAL DAILY
Refills: 0 | COMMUNITY
Start: 2019-09-26 | End: 2022-11-04

## 2019-09-25 RX ORDER — MUPIROCIN 20 MG/G
1 OINTMENT TOPICAL 2 TIMES DAILY
Start: 2019-09-25 | End: 2020-02-17

## 2019-09-25 RX ADMIN — SENNOSIDES, DOCUSATE SODIUM 1 TABLET: 50; 8.6 TABLET, FILM COATED ORAL at 09:09

## 2019-09-25 RX ADMIN — CEFAZOLIN SODIUM 2 G: 2 SOLUTION INTRAVENOUS at 02:09

## 2019-09-25 RX ADMIN — CELECOXIB 200 MG: 100 CAPSULE ORAL at 09:09

## 2019-09-25 RX ADMIN — MUPIROCIN 1 G: 20 OINTMENT TOPICAL at 09:09

## 2019-09-25 RX ADMIN — CITALOPRAM 20 MG: 10 TABLET, FILM COATED ORAL at 09:09

## 2019-09-25 RX ADMIN — TIZANIDINE 4 MG: 4 TABLET ORAL at 02:09

## 2019-09-25 RX ADMIN — PRAVASTATIN SODIUM 40 MG: 40 TABLET ORAL at 09:09

## 2019-09-25 RX ADMIN — HYDROCODONE BITARTRATE AND ACETAMINOPHEN 1 TABLET: 5; 325 TABLET ORAL at 02:09

## 2019-09-25 RX ADMIN — ASPIRIN 325 MG ORAL TABLET 325 MG: 325 PILL ORAL at 09:09

## 2019-09-25 RX ADMIN — TIZANIDINE 4 MG: 4 TABLET ORAL at 06:09

## 2019-09-25 RX ADMIN — POTASSIUM CHLORIDE 20 MEQ: 20 TABLET, EXTENDED RELEASE ORAL at 09:09

## 2019-09-25 NOTE — PLAN OF CARE
09/25/19 1117   Final Note   Assessment Type Final Discharge Note   Anticipated Discharge Disposition Home   Hospital Follow Up  Appt(s) scheduled? Yes

## 2019-09-25 NOTE — HOSPITAL COURSE
Mr. Muniz  is a 68-year-old male who has a history of hypertension, sleep apnea, renal calculi, bladder tumor, and left shoulder osteoarthritis.  He  underwent a left total shoulder arthroplasty done by Dr. Miller on 9/24.  Patient tolerated procedure and is dc home.   He had an episode of urinary retention post-op that required in/out allen but then was able to urinate completely emptying the bladder without difficulty so is dc home without complications and blood counts remained stable.   Alert, Nad. Left shoulder in sling/ice pack. Cor Rrr, abd soft. Ext no edema.     
No

## 2019-09-25 NOTE — DISCHARGE SUMMARY
[facility] Hospital Medicine  Discharge Summary      Patient Name: Sammy Muniz  MRN: [id number]  Admission Date: 9/24/2019  Hospital Length of Stay: 1 days  Discharge Date and Time: No discharge date for patient encounter.  Attending Physician: Ileana Silva MD   Discharging Provider: Ileana Silva MD  Primary Care Provider: Phill Balderrama MD      HPI:   This is a 68-year-old male who has a history of hypertension, sleep apnea, renal calculi, bladder tumor, and left shoulder osteoarthritis.  Today patient underwent a left total shoulder arthroplasty done by Dr. Miller.  Patient tolerated procedure well is currently post procedure.           Procedure(s) (LRB):  ARTHROPLASTY, SHOULDER (Left)      Hospital Course:   Mr. Muniz  is a 68-year-old male who has a history of hypertension, sleep apnea, renal calculi, bladder tumor, and left shoulder osteoarthritis.   He  underwent a left total shoulder arthroplasty done by Dr. Miller on 9/24.  Patient tolerated procedure and is dc home.   He had an episode of urinary retention post-op that required in/out allen but then was able to urinate completely emptying the bladder without difficulty so is dc home without complications and blood counts remained stable.   Alert, Nad. Left shoulder in sling/ice pack. Cor Rrr, abd soft. Ext no edema.        Consults:     No new Assessment & Plan notes have been filed under this hospital service since the last note was generated.  Service: Hospital Medicine    Final Active Diagnoses:    Diagnosis Date Noted POA    PRINCIPAL PROBLEM:  Degenerative arthritis of left shoulder region [M19.012] 08/27/2019 Yes    Essential hypertension [I10] 09/24/2019 Yes    Sleep apnea [G47.30] 09/24/2019 Yes    Osteoarthritis of left shoulder [M19.012] 09/24/2019 Yes    BPH (benign prostatic hyperplasia) [N40.0] 09/24/2019 Yes    Hyperlipidemia [E78.5] 09/24/2019 Yes      Problems Resolved During this Admission:        Discharged Condition: good    Disposition: Home or Self Care    Follow Up:  Follow-up Information     Abad Miller Jr, MD On 10/7/2019.    Specialties:  Orthopedic Surgery, Surgery  Why:  post op 10/7 @1115am  Contact information:  [address]  [address]  [place] [address]  [phone]             Phill Balderrama MD.    Specialty:  Internal Medicine  Why:  As needed  Contact information:  [address]  [place] [address]  [phone]                 Patient Instructions:      Other restrictions (specify):   Order Comments: Per Dr Miller     Notify your health care provider if you experience any of the following:  temperature >100.4     Notify your health care provider if you experience any of the following:  persistent nausea and vomiting or diarrhea     Notify your health care provider if you experience any of the following:  severe uncontrolled pain     Notify your health care provider if you experience any of the following:  redness, tenderness, or signs of infection (pain, swelling, redness, odor or green/yellow discharge around incision site)     Notify your health care provider if you experience any of the following:  difficulty breathing or increased cough       Significant Diagnostic Studies:   Results for FABRIZIO FLORES (MRN [id number]) as of 9/25/2019 12:12   Ref. Range 9/25/2019 04:35   WBC Latest Ref Range: 3.90 - 12.70 K/uL 10.21   RBC Latest Ref Range: 4.60 - 6.20 M/uL 3.64 (L)   Hemoglobin Latest Ref Range: 14.0 - 18.0 g/dL 11.4 (L)   Hematocrit Latest Ref Range: 40.0 - 54.0 % 34.6 (L)   MCV Latest Ref Range: 82 - 98 fL 95   MCH Latest Ref Range: 27.0 - 31.0 pg 31.3 (H)   MCHC Latest Ref Range: 32.0 - 36.0 g/dL 32.9   RDW Latest Ref Range: 11.5 - 14.5 % 12.1   Platelets Latest Ref Range: 150 - 350 K/uL 213   Results for FABRIZIO FLORES (MRN [id number]) as of 9/25/2019 12:12   Ref. Range 9/25/2019 04:35   Sodium Latest Ref Range: 136 - 145 mmol/L 137   Potassium Latest Ref  Range: 3.5 - 5.1 mmol/L 4.6   Chloride Latest Ref Range: 95 - 110 mmol/L 102   CO2 Latest Ref Range: 23 - 29 mmol/L 26   Anion Gap Latest Ref Range: 8 - 16 mmol/L 9   BUN, Bld Latest Ref Range: 8 - 23 mg/dL 26 (H)   Creatinine Latest Ref Range: 0.5 - 1.4 mg/dL 1.2   eGFR if non African American Latest Ref Range: >60 mL/min/1.73 m^2 >60   eGFR if  Latest Ref Range: >60 mL/min/1.73 m^2 >60   Glucose Latest Ref Range: 70 - 110 mg/dL 128 (H)   Calcium Latest Ref Range: 8.7 - 10.5 mg/dL 8.6 (L)       Pending Diagnostic Studies:     None         Medications:  Reconciled Home Medications:      Medication List      START taking these medications    aspirin 325 MG tablet  Take 1 tablet (325 mg total) by mouth once daily.  Start taking on:  September 26, 2019     mupirocin 2 % ointment  Commonly known as:  BACTROBAN  1 g by Nasal route 2 (two) times daily.     senna-docusate 8.6-50 mg 8.6-50 mg per tablet  Commonly known as:  PERICOLACE  Take 1 tablet by mouth 2 (two) times daily.        CONTINUE taking these medications    amlodipine-valsartan  mg per tablet  Commonly known as:  EXFORGE  Take 1 tablet by mouth once daily.     celecoxib 100 MG capsule  Commonly known as:  CeleBREX  Take 100 mg by mouth once daily.     citalopram 20 MG tablet  Commonly known as:  CELEXA  Take 20 mg by mouth once daily.     FISH OIL 1,200-144-216 mg Cap  Generic drug:  fish oil-dha-epa  Take 1 capsule by mouth 2 (two) times daily.     labetalol 200 MG tablet  Commonly known as:  NORMODYNE  Take 200 mg by mouth 2 (two) times daily. Twice a day     potassium chloride SA 20 MEQ tablet  Commonly known as:  K-DUR,KLOR-CON  Take 20 mEq by mouth 2 (two) times daily.     pravastatin 40 MG tablet  Commonly known as:  PRAVACHOL  Take 40 mg by mouth once daily. Every morning     spironolactone 100 MG tablet  Commonly known as:  ALDACTONE  Take 100 mg by mouth once daily.     tamsulosin 0.4 mg Cap  Commonly known as:  FLOMAX  Take 2  capsules (0.8 mg total) by mouth every evening. Every day     tiZANidine 4 MG tablet  Commonly known as:  ZANAFLEX  TAKE 1 TABLET(4 MG) BY MOUTH TWICE DAILY            Indwelling Lines/Drains at time of discharge:   Lines/Drains/Airways     None                 Time spent on the discharge of patient: 23 minutes  Patient was seen and examined on the date of discharge and determined to be suitable for discharge.         Ileana Silva MD  Department of Hospital Medicine  [facility]

## 2019-09-25 NOTE — PROGRESS NOTES
pod1   Block stil;l working,    Drain out,   hct 34,   Some prob voiding   ,,has urologist,    Hopefully d/c later today,   rx perc 7,  Asa,  rtc 2 wks

## 2019-09-25 NOTE — PLAN OF CARE
09/24/19 2145   Patient Assessment/Suction   Level of Consciousness (AVPU) alert   Respiratory Effort Unlabored   PRE-TX-O2   O2 Device (Oxygen Therapy) nasal cannula   $ Is the patient on Low Flow Oxygen? Yes   Flow (L/min) 3   SpO2 (!) 93 %   Pulse Oximetry Type Intermittent   $ Pulse Oximetry - Multiple Charge Pulse Oximetry - Multiple   Incentive Spirometer   $ Incentive Spirometer Charges done with encouragement   Administration (IS) proper technique demonstrated   Number of Repetitions (IS) 10   Level Incentive Spirometer (mL) 1500   Patient Tolerance (IS) good   Preset CPAP/BiPAP Settings   Mode Of Delivery other (see comments)  (pt does not wear machine and does not want one now in his ro)   $ Initial CPAP/BiPAP Setup? No   $ Is patient using? No/refused   Reason patient is not wearing? Patient refused   pt states he hasnt worn a cpap since his belly surgery and he does not want one now in his room. He does understand if he needs it we have it.

## 2019-09-25 NOTE — PLAN OF CARE
09/25/19 0716   PRE-TX-O2   O2 Device (Oxygen Therapy) nasal cannula   $ Is the patient on Low Flow Oxygen? Yes   Flow (L/min) 2  (decreased to 2 lpm )   SpO2 96 %   Pulse Oximetry Type Intermittent   $ Pulse Oximetry - Multiple Charge Pulse Oximetry - Multiple   Incentive Spirometer   $ Incentive Spirometer Charges done with encouragement   Administration (IS) proper technique demonstrated   Number of Repetitions (IS) 10   Level Incentive Spirometer (mL) 1500   Patient Tolerance (IS) good

## 2019-09-25 NOTE — PLAN OF CARE
No acute events overnight. Alert and oriented when awake, slept between care. Patient understands and agrees with plan of care. PIV in place, flushed and patent as ordered. Vitals stable and within patient's baseline since admission on 3L NC. Diligent coughing, deep breathing and incentive spirometer use encouraged. Pain controlled on current regimen. No nausea reported overnight. Bladder scanned and straight cath ordered b/c pt unable to void. Please see I&O's in flowsheet. No BM's overnight. Dressing L shoulder; clean, dry and intact. Instructed to call for help as needed, call light in reach. Bed in lowest position and brake set. Frequent rounds made for patient's safety. Continuing to monitor closely.

## 2019-09-25 NOTE — PT/OT/SLP EVAL
"Occupational Therapy   Evaluation and Discharge Note    Name: Sammy Muniz  MRN: [id number]  Admitting Diagnosis:  Degenerative arthritis of left shoulder region 1 Day Post-Op    Recommendations:     Discharge Recommendations: outpatient OT  Discharge Equipment Recommendations:  none  Barriers to discharge:  None    Assessment:     Sammy Muniz is a 68 y.o. male with a medical diagnosis of Degenerative arthritis of left shoulder region. At this time, patient is modified independent grooming tasks at sink and with transfers. Pt requires still assist with dressing but pt will receive assistance from spouse. Education and precautions provided to pt regarding ADL participation. Pt does not require further acute OT needs.     Plan:     During this hospitalization, patient does not require further acute OT services.  Please re-consult if situation changes.    · Plan of Care Reviewed with: patient, spouse    Subjective     Chief Complaint: none  Patient/Family Comments/goals: "This is not my first rodeo."    Occupational Profile:  Living Environment: Pt lives with wife in a [facility] with 1 LYN[address].   Previous level of function: Pt was independent with ADLs and mobility.   Equipment Used at home:  other (see comments)(pt has RW, BSC, and shower chair from previsous surgeries. )  Assistance upon Discharge: Pt will receive assistance from wife.    Pain/Comfort:  · Pain Rating 1: 0/10  · Pain Rating Post-Intervention 1: 0/10    Patients cultural, spiritual, Shinto conflicts given the current situation:      Objective:     Communicated with: nurse Pelayo prior to session.  Patient found HOB elevated with cryotherapy, peripheral IV, SCD upon OT entry to room.    General Precautions: Standard, fall   Orthopedic Precautions:LUE non weight bearing   Braces: Sling and swathe     Occupational Performance:    Bed Mobility:    · Patient completed Scooting/Bridging with modified independence  · Patient completed Supine to Sit with " modified independence  · Patient completed Sit to Supine with modified independence    Functional Mobility/Transfers:  · Patient completed Sit <> Stand Transfer with modified independence  with  no assistive device   · Patient completed Toilet Transfer Stand Pivot technique with modified independence with  no AD  · Functional Mobility: Pt ambulated in room with Mod I and no AD from bed>sink>bathroom>bed again. No LOB.    Activities of Daily Living:  · Grooming: modified independence with oral hygiene while standing at sink.  · Lower Body Dressing: moderate assistance to don/doff socks    Cognitive/Visual Perceptual:  Cognitive/Psychosocial Skills:     -       Oriented to: x4   -       Follows Commands/attention:Follows multistep  commands  -       Communication: clear/fluent  -       Safety awareness/insight to disability: intact   -       Mood/Affect/Coping skills/emotional control: Appropriate to situation and Cooperative  Visual/Perceptual:      -Intact     Physical Exam:  Postural examination/scapula alignment:    -       Rounded shoulders  -       Forward head  Upper Extremity Range of Motion:     -       Right Upper Extremity: WNL  -       Left Upper Extremity: Not assessed 2/2 UE sling   Upper Extremity Strength:    -       Right Upper Extremity: 4/5  -       Left Upper Extremity: Not assessed 2/2 UE sling   Strength:    -       Right Upper Extremity: WNL  -       Left Upper Extremity: WNL  Fine Motor Coordination:    -       Intact  Gross motor coordination:   Limited in UE's 2/2 R UE in sling    AMPAC 6 Click ADL:  AMPAC Total Score: 22    Treatment & Education:  Pt demonstrated knowledge on proper one hand techniques with LB dressing and grooming, as well as shoulder brace removal and replacement.     Pt was also knowledgeable regarding AROM exercises and L UE placement to minimize edema.     OT educated pt on keeping hand at level of elbow or higher to minimize edema.    OT ed pt on OT role &  discharge recommendations.        Education:    Patient left HOB elevated with call button in reach, nurse notified and wife present    GOALS:   Multidisciplinary Problems     Occupational Therapy Goals     Not on file                History:     Past Medical History:   Diagnosis Date    Arthritis     left knee; right shoulder    Bladder tumor     BPH (benign prostatic hyperplasia)     Hypertension     Sleep apnea     Doews not use C-pap       Past Surgical History:   Procedure Laterality Date    CERVICAL FUSION      CYSTOSCOPY      GASTRIC BYPASS  2012    HERNIA REPAIR      ventral hernia    JOINT REPLACEMENT      Bilateral knees & Rt shoulder    PROSTATE SURGERY      greenlight laser    TONSILLECTOMY      TOTAL KNEE ARTHROPLASTY  left    TOTAL SHOULDER ARTHROPLASTY  right    TYMPANOPLASTY  left    VASECTOMY         Time Tracking:     OT Date of Treatment: 09/25/19  OT Start Time: 1005  OT Stop Time: 1021  OT Total Time (min): 16 min    Billable Minutes:Evaluation 8  Self Care/Home Management 8    Cameron Paris, OT  9/25/2019

## 2019-09-26 ENCOUNTER — PATIENT OUTREACH (OUTPATIENT)
Dept: ADMINISTRATIVE | Facility: CLINIC | Age: 69
End: 2019-09-26

## 2019-09-26 ENCOUNTER — TELEPHONE (OUTPATIENT)
Dept: MEDSURG UNIT | Facility: HOSPITAL | Age: 69
End: 2019-09-26

## 2019-09-26 VITALS
DIASTOLIC BLOOD PRESSURE: 63 MMHG | BODY MASS INDEX: 34.72 KG/M2 | OXYGEN SATURATION: 95 % | HEIGHT: 71 IN | WEIGHT: 248 LBS | TEMPERATURE: 98 F | SYSTOLIC BLOOD PRESSURE: 119 MMHG | RESPIRATION RATE: 20 BRPM | HEART RATE: 68 BPM

## 2019-09-26 NOTE — PATIENT INSTRUCTIONS
Recognizing and Treating Wound Infection  Wounds can become infected with harmful bacteria. This prevents healing and increases the risk of scars. In some cases, the infection may spread to other parts of the body. And infection with the bacteria that cause tetanus can be fatal. Know what to watch for and get prompt treatment for infection.     Risk Factors  A wound is more likely to become infected if it:  Results from a puncture, such as from a nail or piece of glass.  Results from a human or animal bite.  Isn't cleaned or treated within 8 hours.  Occurs in your hand, foot, leg, armpit, or groin.  Contains dirt or saliva.  Heals very slowly.  Occurs in a person with diabetes, alcoholism, or a compromised immune system.    Symptoms of Infection  Call your healthcare provider at the first sign of infection, such as:  Yellow, yellow-green, or foul-smelling drainage from a wound  Increased pain, swelling, or redness in or near a wound  A change in the color or size of a wound  Red streaks in the skin around the wound  Fever    Treatment  Treatment is likely to depend on the type and extent of your infection. Your healthcare provider may prescribe oral antibiotics to help fight bacteria. He or she may also flush the wound with an antibiotic solution or apply an antibiotic ointment. Sometimes an abscess (a pocket of pus) may form. In that case, the abscess will be opened and the fluid drained. You may need hospital care if the infection is very severe.    Preventing Wound Infection  Follow these steps to help keep wounds from developing infection:  Wash the wound right away with soap and water.  Apply a small amount of antibiotic ointment. You can buy this at the drugstore without a prescription.  Cover wounds with a bandage or gauze dressing.  Keep the wound clean and dry for the first 24hrs  Change the dressing daily using sterile gloves  © 3705-6732 [employer], [address] [place] [address], [place] [address]. All  rights reserved. This information is not intended as a substitute for professional medical care. Always follow your healthcare professional's instructions.

## 2019-09-26 NOTE — NURSING
PIV removed. Surgical dsg changed. Discharge wound care orders discussed with pt and pts wife.Supplies given.  Both verbalized understanding.  Ice cooler instructions explained and given to pt for home use. Follow up appnts discussed. D/C instructions given and explained. Medications reviewed and discussed. Hard copy Rx given to patient.All questions answered.  Patient and patients wife verbalized understanding. Pt left unit via w/c with ALESHIA Reyna. No distress noted and denies pain at this time.

## 2019-10-07 ENCOUNTER — OFFICE VISIT (OUTPATIENT)
Dept: ORTHOPEDICS | Facility: CLINIC | Age: 69
End: 2019-10-07
Payer: MEDICARE

## 2019-10-07 VITALS
HEIGHT: 71 IN | SYSTOLIC BLOOD PRESSURE: 130 MMHG | HEART RATE: 75 BPM | WEIGHT: 248 LBS | BODY MASS INDEX: 34.72 KG/M2 | DIASTOLIC BLOOD PRESSURE: 60 MMHG

## 2019-10-07 DIAGNOSIS — Z96.612 S/P SHOULDER REPLACEMENT, LEFT: Primary | ICD-10-CM

## 2019-10-07 PROCEDURE — 99024 PR POST-OP FOLLOW-UP VISIT: ICD-10-PCS | Mod: S$GLB,,, | Performed by: ORTHOPAEDIC SURGERY

## 2019-10-07 PROCEDURE — 99024 POSTOP FOLLOW-UP VISIT: CPT | Mod: S$GLB,,, | Performed by: ORTHOPAEDIC SURGERY

## 2019-10-07 RX ORDER — OXYCODONE AND ACETAMINOPHEN 7.5; 325 MG/1; MG/1
TABLET ORAL
Refills: 0 | COMMUNITY
Start: 2019-09-25 | End: 2019-10-07 | Stop reason: CLARIF

## 2019-10-07 RX ORDER — HYDROCODONE BITARTRATE AND ACETAMINOPHEN 7.5; 325 MG/1; MG/1
1 TABLET ORAL EVERY 6 HOURS PRN
Qty: 28 TABLET | Refills: 0 | Status: SHIPPED | OUTPATIENT
Start: 2019-10-07 | End: 2019-10-14

## 2019-10-07 NOTE — PROGRESS NOTES
[facility] ORTHOPEDICS POST-OP NOTE    Subjective:           Chief Complaint:   Chief Complaint   Patient presents with    Left Shoulder - Post-op Evaluation     Left TSA 9.24.19. Suture removal. States that his shoulder is doing ok, just has soreness.        Past Medical History:   Diagnosis Date    Arthritis     left knee; right shoulder    Bladder tumor     BPH (benign prostatic hyperplasia)     Hypertension     Sleep apnea     Doews not use C-pap       Past Surgical History:   Procedure Laterality Date    ARTHROPLASTY OF SHOULDER Left 9/24/2019    Procedure: ARTHROPLASTY, SHOULDER;  Surgeon: Abad Miller Jr., MD;  Location: [facility];  Service: Orthopedics;  Laterality: Left;  [employer]-Ranjan Lopes notified 9/20-tcb    CERVICAL FUSION      CYSTOSCOPY      GASTRIC BYPASS  2012    HERNIA REPAIR      ventral hernia    JOINT REPLACEMENT      Bilateral knees & Rt shoulder    PROSTATE SURGERY      greenlight laser    TONSILLECTOMY      TOTAL KNEE ARTHROPLASTY  left    TOTAL SHOULDER ARTHROPLASTY  right    TYMPANOPLASTY  left    VASECTOMY         Current Outpatient Medications   Medication Sig    amlodipine-valsartan (EXFORGE)  mg per tablet Take 1 tablet by mouth once daily.     aspirin 325 MG tablet Take 1 tablet (325 mg total) by mouth once daily.    celecoxib (CELEBREX) 100 MG capsule Take 100 mg by mouth once daily.     citalopram (CELEXA) 20 MG tablet Take 20 mg by mouth once daily.     fish oil-dha-epa (FISH OIL) 1,200-144-216 mg Cap Take 1 capsule by mouth 2 (two) times daily.     labetalol (NORMODYNE) 200 MG tablet Take 200 mg by mouth 2 (two) times daily. Twice a day    potassium chloride SA (K-DUR,KLOR-CON) 20 MEQ tablet Take 20 mEq by mouth 2 (two) times daily.     pravastatin (PRAVACHOL) 40 MG tablet Take 40 mg by mouth once daily. Every morning    spironolactone (ALDACTONE) 100 MG tablet Take 100 mg by mouth once daily.     tamsulosin (FLOMAX) 0.4 mg Cp24 Take 2 capsules  (0.8 mg total) by mouth every evening. Every day    tiZANidine (ZANAFLEX) 4 MG tablet TAKE 1 TABLET(4 MG) BY MOUTH TWICE DAILY    mupirocin (BACTROBAN) 2 % ointment 1 g by Nasal route 2 (two) times daily. (Patient not taking: Reported on 10/7/2019)    oxyCODONE-acetaminophen (PERCOCET) 7.5-325 mg per tablet TK 1 T PO Q 4 HOURS PRN    senna-docusate 8.6-50 mg (PERICOLACE) 8.6-50 mg per tablet Take 1 tablet by mouth 2 (two) times daily. (Patient not taking: Reported on 10/7/2019)     No current facility-administered medications for this visit.        Review of patient's allergies indicates:   Allergen Reactions    Iodinated contrast media Itching       Family History   Problem Relation Age of Onset    Benign prostatic hyperplasia Brother     Heart disease Mother     Kidney disease Mother     Arthritis Mother     Mental illness Father 84        dementia    Diabetes Father     Hypertension Father     Heart disease Father     Cancer Father 75        colon    Prostate cancer Neg Hx     Urolithiasis Neg Hx     Kidney cancer Neg Hx        Social History     Socioeconomic History    Marital status:      Spouse name: Not on file    Number of children: Not on file    Years of education: Not on file    Highest education level: Not on file   Occupational History    Not on file   Social Needs    Financial resource strain: Not on file    Food insecurity:     Worry: Not on file     Inability: Not on file    Transportation needs:     Medical: Not on file     Non-medical: Not on file   Tobacco Use    Smoking status: Never Smoker    Smokeless tobacco: Never Used   Substance and Sexual Activity    Alcohol use: No    Drug use: No    Sexual activity: Not on file   Lifestyle    Physical activity:     Days per week: Not on file     Minutes per session: Not on file    Stress: Not on file   Relationships    Social connections:     Talks on phone: Not on file     Gets together: Not on file     Attends  Adventism service: Not on file     Active member of club or organization: Not on file     Attends meetings of clubs or organizations: Not on file     Relationship status: Not on file   Other Topics Concern    Not on file   Social History Narrative    Not on file       History of present illness: 2 weeks follow-up total shoulder left. Seems to be doing okay. Medicines affected his head little bit so is cut back on his pain pills. Not having too much pain and left arm at this time      Review of Systems:    Musculoskeletal:  See HPI      Objective:        Physical Examination:    Vital Signs:    Vitals:    10/07/19 1113   BP: 130/60   Pulse: 75       Body mass index is 34.59 kg/m².    This a well-developed, well nourished patient in no acute distress.  They are alert and oriented and cooperative to examination.        Left shoulder shows a incision looks good sutures are out. The arms handwork and fine. Did not test range of motion.  Pertinent New Results:    XRAY Report / Interpretation:   AP x-ray left shoulder shows a total shoulder arthroplasty no acute findings. Is is is significantly oblique picture so it's hard to get proper orientation of height. Overall alignment looks appropriate.Electronically Signed By Abad Miller JR, MD    Assessment/Plan:      Plan is a circumduction next couple weeks start PT at the four-week arc. Norco 7 for pain. I would like a different AP x-ray of her shoulder next time    This note was created using Dragon voice recognition software that occasionally misinterpreted phrases or words.

## 2019-11-04 ENCOUNTER — OFFICE VISIT (OUTPATIENT)
Dept: ORTHOPEDICS | Facility: CLINIC | Age: 69
End: 2019-11-04
Payer: MEDICARE

## 2019-11-04 VITALS
OXYGEN SATURATION: 95 % | HEART RATE: 73 BPM | WEIGHT: 250 LBS | SYSTOLIC BLOOD PRESSURE: 136 MMHG | BODY MASS INDEX: 35 KG/M2 | DIASTOLIC BLOOD PRESSURE: 90 MMHG | HEIGHT: 71 IN

## 2019-11-04 DIAGNOSIS — Z96.612 S/P SHOULDER REPLACEMENT, LEFT: Primary | ICD-10-CM

## 2019-11-04 PROCEDURE — 99024 POSTOP FOLLOW-UP VISIT: CPT | Mod: S$GLB,,, | Performed by: ORTHOPAEDIC SURGERY

## 2019-11-04 PROCEDURE — 99024 PR POST-OP FOLLOW-UP VISIT: ICD-10-PCS | Mod: S$GLB,,, | Performed by: ORTHOPAEDIC SURGERY

## 2019-11-04 NOTE — PROGRESS NOTES
[facility]OPED[facility]    Subjective:     Chief Complaint:   Chief Complaint   Patient presents with    Left Shoulder - Post-op Evaluation     PT has helped pt, pain is a 2-3/10 on a pain scale, when experiencing pain takes NSAIDs that has helped.       Past Medical History:   Diagnosis Date    Arthritis     left knee; right shoulder    Bladder tumor     BPH (benign prostatic hyperplasia)     Hypertension     Sleep apnea     Doews not use C-pap       Past Surgical History:   Procedure Laterality Date    ARTHROPLASTY OF SHOULDER Left 9/24/2019    Procedure: ARTHROPLASTY, SHOULDER;  Surgeon: Abad Miller Jr., MD;  Location: [facility];  Service: Orthopedics;  Laterality: Left;  Keren[employer]-Ranjan Lopes notified 9/20-tcb    CERVICAL FUSION      CYSTOSCOPY      GASTRIC BYPASS  2012    HERNIA REPAIR      ventral hernia    JOINT REPLACEMENT      Bilateral knees & Rt shoulder    PROSTATE SURGERY      greenlight laser    TONSILLECTOMY      TOTAL KNEE ARTHROPLASTY  left    TOTAL SHOULDER ARTHROPLASTY  right    TYMPANOPLASTY  left    VASECTOMY         Current Outpatient Medications   Medication Sig    amlodipine-valsartan (EXFORGE)  mg per tablet Take 1 tablet by mouth once daily.     aspirin 325 MG tablet Take 1 tablet (325 mg total) by mouth once daily.    celecoxib (CELEBREX) 100 MG capsule Take 100 mg by mouth once daily.     citalopram (CELEXA) 20 MG tablet Take 20 mg by mouth once daily.     fish oil-dha-epa (FISH OIL) 1,200-144-216 mg Cap Take 1 capsule by mouth 2 (two) times daily.     labetalol (NORMODYNE) 200 MG tablet Take 200 mg by mouth 2 (two) times daily. Twice a day    mupirocin (BACTROBAN) 2 % ointment 1 g by Nasal route 2 (two) times daily.    potassium chloride SA (K-DUR,KLOR-CON) 20 MEQ tablet Take 20 mEq by mouth 2 (two) times daily.     pravastatin (PRAVACHOL) 40 MG tablet Take 40 mg by mouth once daily. Every morning    senna-docusate 8.6-50 mg (PERICOLACE) 8.6-50 mg per  tablet Take 1 tablet by mouth 2 (two) times daily.    spironolactone (ALDACTONE) 100 MG tablet Take 100 mg by mouth once daily.     tamsulosin (FLOMAX) 0.4 mg Cp24 Take 2 capsules (0.8 mg total) by mouth every evening. Every day    tiZANidine (ZANAFLEX) 4 MG tablet TAKE 1 TABLET(4 MG) BY MOUTH TWICE DAILY     No current facility-administered medications for this visit.        Review of patient's allergies indicates:   Allergen Reactions    Iodinated contrast media Itching       Family History   Problem Relation Age of Onset    Benign prostatic hyperplasia Brother     Heart disease Mother     Kidney disease Mother     Arthritis Mother     Mental illness Father 84        dementia    Diabetes Father     Hypertension Father     Heart disease Father     Cancer Father 75        colon    Prostate cancer Neg Hx     Urolithiasis Neg Hx     Kidney cancer Neg Hx        Social History     Socioeconomic History    Marital status:      Spouse name: Not on file    Number of children: Not on file    Years of education: Not on file    Highest education level: Not on file   Occupational History    Not on file   Social Needs    Financial resource strain: Not on file    Food insecurity:     Worry: Not on file     Inability: Not on file    Transportation needs:     Medical: Not on file     Non-medical: Not on file   Tobacco Use    Smoking status: Never Smoker    Smokeless tobacco: Never Used   Substance and Sexual Activity    Alcohol use: No    Drug use: No    Sexual activity: Not on file   Lifestyle    Physical activity:     Days per week: Not on file     Minutes per session: Not on file    Stress: Not on file   Relationships    Social connections:     Talks on phone: Not on file     Gets together: Not on file     Attends Yarsani service: Not on file     Active member of club or organization: Not on file     Attends meetings of clubs or organizations: Not on file     Relationship status: Not on  file   Other Topics Concern    Not on file   Social History Narrative    Not on file       History of present illness:  6 weeks follow-up total shoulder left.  He has been going to therapy.  He has a simple sling.  Very little pain      Review of Systems:    Constitution: Negative for chills, fever, and sweats.  Negative for unexplained weight loss.    HENT:  Negative for headaches and blurry vision.    Cardiovascular:Negative for chest pain or irregular heart beat. Negative for hypertension.    Respiratory:  Negative for cough and shortness of breath.    Gastrointestinal: Negative for abdominal pain, heartburn, melena, nausea, and vomitting.    Genitourinary:  Negative bladder incontinence and dysuria.    Musculoskeletal:  See HPI for details.     Neurological: Negative for numbness.    Psychiatric/Behavioral: Negative for depression.  The patient is not nervous/anxious.      Endocrine: Negative for polyuria    Hematologic/Lymphatic: Negative for bleeding problem.  Does not bruise/bleed easily.    Skin: Negative for poor would healing and rash    Objective:      Physical Examination:    Vital Signs:    Vitals:    11/04/19 1025   BP: (!) 136/90   Pulse: 73       Body mass index is 34.87 kg/m².    This a well-developed, well nourished patient in no acute distress.  They are alert and oriented and cooperative to examination.        Physical exam shows that he has forward flexion to 90 without pain rotates about a 3rd normal nice smooth motion. Wound looks good lower arm looks great  Pertinent New Results:    XRAY Report / Interpretation:   AP x-ray left shoulder shows total shoulder arthroplasty in good position. Length and position looked appropriate no acute findings.  Electronically Signed By Abad Miller JR, MD    Assessment/Plan:      He is my impression total shoulder 6 weeks doing well.  Plans continue physical therapy wean out of the sling does not require pain meds today will see him back at the 6 weeks.   We will need an AP x-ray at that point.  So far doing well.  Work not an issue      This note was created using Dragon voice recognition software that occasionally misinterpreted phrases or words.

## 2019-12-16 ENCOUNTER — OFFICE VISIT (OUTPATIENT)
Dept: ORTHOPEDICS | Facility: CLINIC | Age: 69
End: 2019-12-16
Payer: MEDICARE

## 2019-12-16 VITALS
WEIGHT: 250 LBS | BODY MASS INDEX: 35 KG/M2 | SYSTOLIC BLOOD PRESSURE: 136 MMHG | HEIGHT: 71 IN | DIASTOLIC BLOOD PRESSURE: 79 MMHG | HEART RATE: 90 BPM

## 2019-12-16 DIAGNOSIS — Z96.612 S/P SHOULDER REPLACEMENT, LEFT: Primary | ICD-10-CM

## 2019-12-16 PROCEDURE — 99024 PR POST-OP FOLLOW-UP VISIT: ICD-10-PCS | Mod: S$GLB,,, | Performed by: ORTHOPAEDIC SURGERY

## 2019-12-16 PROCEDURE — 99024 POSTOP FOLLOW-UP VISIT: CPT | Mod: S$GLB,,, | Performed by: ORTHOPAEDIC SURGERY

## 2019-12-16 NOTE — PROGRESS NOTES
[facility] ORTHOPEDICS POST-OP NOTE    Subjective:           Chief Complaint:   Chief Complaint   Patient presents with    Left Shoulder - Post-op Evaluation     Left TSA 9.24.19. States that his shoulder is doing good, still in PT        Past Medical History:   Diagnosis Date    Arthritis     left knee; right shoulder    Bladder tumor     BPH (benign prostatic hyperplasia)     Hypertension     Sleep apnea     Doews not use C-pap       Past Surgical History:   Procedure Laterality Date    ARTHROPLASTY OF SHOULDER Left 9/24/2019    Procedure: ARTHROPLASTY, SHOULDER;  Surgeon: Abad Miller Jr., MD;  Location: [facility];  Service: Orthopedics;  Laterality: Left;  [employer]-Ranjan Lopes notified 9/20-tcb    CERVICAL FUSION      CYSTOSCOPY      GASTRIC BYPASS  2012    HERNIA REPAIR      ventral hernia    JOINT REPLACEMENT      Bilateral knees & Rt shoulder    PROSTATE SURGERY      greenlight laser    TONSILLECTOMY      TOTAL KNEE ARTHROPLASTY  left    TOTAL SHOULDER ARTHROPLASTY  right    TYMPANOPLASTY  left    VASECTOMY         Current Outpatient Medications   Medication Sig    amlodipine-valsartan (EXFORGE)  mg per tablet Take 1 tablet by mouth once daily.     aspirin 325 MG tablet Take 1 tablet (325 mg total) by mouth once daily.    celecoxib (CELEBREX) 100 MG capsule Take 100 mg by mouth once daily.     citalopram (CELEXA) 20 MG tablet Take 20 mg by mouth once daily.     fish oil-dha-epa (FISH OIL) 1,200-144-216 mg Cap Take 1 capsule by mouth 2 (two) times daily.     labetalol (NORMODYNE) 200 MG tablet Take 200 mg by mouth 2 (two) times daily. Twice a day    potassium chloride SA (K-DUR,KLOR-CON) 20 MEQ tablet Take 20 mEq by mouth 2 (two) times daily.     pravastatin (PRAVACHOL) 40 MG tablet Take 40 mg by mouth once daily. Every morning    spironolactone (ALDACTONE) 100 MG tablet Take 100 mg by mouth once daily.     tamsulosin (FLOMAX) 0.4 mg Cp24 Take 2 capsules (0.8 mg total) by mouth  every evening. Every day    mupirocin (BACTROBAN) 2 % ointment 1 g by Nasal route 2 (two) times daily. (Patient not taking: Reported on 12/16/2019)    senna-docusate 8.6-50 mg (PERICOLACE) 8.6-50 mg per tablet Take 1 tablet by mouth 2 (two) times daily. (Patient not taking: Reported on 12/16/2019)    tiZANidine (ZANAFLEX) 4 MG tablet TAKE 1 TABLET(4 MG) BY MOUTH TWICE DAILY (Patient not taking: Reported on 12/16/2019)     No current facility-administered medications for this visit.        Review of patient's allergies indicates:   Allergen Reactions    Iodinated contrast media Itching       Family History   Problem Relation Age of Onset    Benign prostatic hyperplasia Brother     Heart disease Mother     Kidney disease Mother     Arthritis Mother     Mental illness Father 84        dementia    Diabetes Father     Hypertension Father     Heart disease Father     Cancer Father 75        colon    Prostate cancer Neg Hx     Urolithiasis Neg Hx     Kidney cancer Neg Hx        Social History     Socioeconomic History    Marital status:      Spouse name: Not on file    Number of children: Not on file    Years of education: Not on file    Highest education level: Not on file   Occupational History    Not on file   Social Needs    Financial resource strain: Not on file    Food insecurity:     Worry: Not on file     Inability: Not on file    Transportation needs:     Medical: Not on file     Non-medical: Not on file   Tobacco Use    Smoking status: Never Smoker    Smokeless tobacco: Never Used   Substance and Sexual Activity    Alcohol use: No    Drug use: No    Sexual activity: Not on file   Lifestyle    Physical activity:     Days per week: Not on file     Minutes per session: Not on file    Stress: Not on file   Relationships    Social connections:     Talks on phone: Not on file     Gets together: Not on file     Attends Adventism service: Not on file     Active member of club or  organization: Not on file     Attends meetings of clubs or organizations: Not on file     Relationship status: Not on file   Other Topics Concern    Not on file   Social History Narrative    Not on file       History of present illness:  3 months follow-up left total shoulder.  Of he is improving weekly with therapy.  Pain is not much.  She still has some stiffness still has weakness.    Review of Systems:    Musculoskeletal:  See HPI      Objective:        Physical Examination:    Vital Signs:    Vitals:    12/16/19 0836   BP: 136/79   Pulse: 90       Body mass index is 34.87 kg/m².    This a well-developed, well nourished patient in no acute distress.  They are alert and oriented and cooperative to examination.        Physical exam shows he lacks a few degrees of forward flexion but the other side stiff as well can barely get the hand to the waist.  External rotation is 50% or more.  He does not have pain motion at the side. He does have gross weakness to abduction.  He cannot abduct against gravity.  Passively we can abduct to 90 without pain. Does not have numbness in the axillary nerve distribution  Pertinent New Results:    XRAY Report / Interpretation:   AP x-ray left shoulder shows a total shoulder arthroplasty.  Humeral component is in good position humeral head of compound slightly higher than the greater tuberosity.  Good fit and fill on the proximal humerus.  A nicely preserved subdeltoid space.  Glenoid component metal peg appears to be appropriately positioned.  No acute findings.  Signature    Assessment/Plan:      So my impression is he is slowly getting his motion x-ray looks good my question is the abduction weakness. Potentially axillary nerve potentially of rotator cuff issues.  He is relatively happy recovering so far so were going to wait 3 more months.  One more round of therapy.  Three months from now evaluate the weakness to see if we need to work that up with the MRI and/or nerve test.   No medicines required    This note was created using Dragon voice recognition software that occasionally misinterpreted phrases or words.

## 2020-02-03 ENCOUNTER — OFFICE VISIT (OUTPATIENT)
Dept: ORTHOPEDICS | Facility: CLINIC | Age: 70
End: 2020-02-03
Payer: MEDICARE

## 2020-02-03 VITALS
BODY MASS INDEX: 35 KG/M2 | HEART RATE: 76 BPM | DIASTOLIC BLOOD PRESSURE: 69 MMHG | WEIGHT: 250 LBS | SYSTOLIC BLOOD PRESSURE: 115 MMHG | HEIGHT: 71 IN

## 2020-02-03 DIAGNOSIS — S44.32XA INJURY OF LEFT AXILLARY NERVE, INITIAL ENCOUNTER: ICD-10-CM

## 2020-02-03 DIAGNOSIS — Z96.612 HISTORY OF TOTAL SHOULDER REPLACEMENT, LEFT: Primary | ICD-10-CM

## 2020-02-03 DIAGNOSIS — M75.102 NONTRAUMATIC TEAR OF LEFT ROTATOR CUFF, UNSPECIFIED TEAR EXTENT: ICD-10-CM

## 2020-02-03 PROCEDURE — 1101F PR PT FALLS ASSESS DOC 0-1 FALLS W/OUT INJ PAST YR: ICD-10-PCS | Mod: S$GLB,,, | Performed by: ORTHOPAEDIC SURGERY

## 2020-02-03 PROCEDURE — 1159F PR MEDICATION LIST DOCUMENTED IN MEDICAL RECORD: ICD-10-PCS | Mod: S$GLB,,, | Performed by: ORTHOPAEDIC SURGERY

## 2020-02-03 PROCEDURE — 99213 OFFICE O/P EST LOW 20 MIN: CPT | Mod: 25,S$GLB,, | Performed by: ORTHOPAEDIC SURGERY

## 2020-02-03 PROCEDURE — 1125F AMNT PAIN NOTED PAIN PRSNT: CPT | Mod: S$GLB,,, | Performed by: ORTHOPAEDIC SURGERY

## 2020-02-03 PROCEDURE — 3078F PR MOST RECENT DIASTOLIC BLOOD PRESSURE < 80 MM HG: ICD-10-PCS | Mod: S$GLB,,, | Performed by: ORTHOPAEDIC SURGERY

## 2020-02-03 PROCEDURE — 1125F PR PAIN SEVERITY QUANTIFIED, PAIN PRESENT: ICD-10-PCS | Mod: S$GLB,,, | Performed by: ORTHOPAEDIC SURGERY

## 2020-02-03 PROCEDURE — 3078F DIAST BP <80 MM HG: CPT | Mod: S$GLB,,, | Performed by: ORTHOPAEDIC SURGERY

## 2020-02-03 PROCEDURE — 3074F SYST BP LT 130 MM HG: CPT | Mod: S$GLB,,, | Performed by: ORTHOPAEDIC SURGERY

## 2020-02-03 PROCEDURE — 99213 PR OFFICE/OUTPT VISIT, EST, LEVL III, 20-29 MIN: ICD-10-PCS | Mod: 25,S$GLB,, | Performed by: ORTHOPAEDIC SURGERY

## 2020-02-03 PROCEDURE — 1159F MED LIST DOCD IN RCRD: CPT | Mod: S$GLB,,, | Performed by: ORTHOPAEDIC SURGERY

## 2020-02-03 PROCEDURE — 3074F PR MOST RECENT SYSTOLIC BLOOD PRESSURE < 130 MM HG: ICD-10-PCS | Mod: S$GLB,,, | Performed by: ORTHOPAEDIC SURGERY

## 2020-02-03 PROCEDURE — 1101F PT FALLS ASSESS-DOCD LE1/YR: CPT | Mod: S$GLB,,, | Performed by: ORTHOPAEDIC SURGERY

## 2020-02-03 NOTE — PROGRESS NOTES
[facility]    Subjective:     Chief Complaint:   Chief Complaint   Patient presents with    Left Shoulder - Pain     Left shoulder pain x a while. States that he had a TSA 9.24.19.  States that he is still not able to raise his arm,  He is still in PT. Very painful at times,        Past Medical History:   Diagnosis Date    Arthritis     left knee; right shoulder    Bladder tumor     BPH (benign prostatic hyperplasia)     Hypertension     Sleep apnea     Doews not use C-pap       Past Surgical History:   Procedure Laterality Date    ARTHROPLASTY OF SHOULDER Left 9/24/2019    Procedure: ARTHROPLASTY, SHOULDER;  Surgeon: Abad Miller Jr., MD;  [facility];  Service: Orthopedics;  Laterality: Left;  [employer] L[employer] notified 9/20-tcb    CERVICAL FUSION      CYSTOSCOPY      GASTRIC BYPASS  2012    HERNIA REPAIR      ventral hernia    JOINT REPLACEMENT      Bilateral knees & Rt shoulder    PROSTATE SURGERY      greenlight laser    TONSILLECTOMY      TOTAL KNEE ARTHROPLASTY  left    TOTAL SHOULDER ARTHROPLASTY  right    TYMPANOPLASTY  left    VASECTOMY         Current Outpatient Medications   Medication Sig    amlodipine-valsartan (EXFORGE)  mg per tablet Take 1 tablet by mouth once daily.     aspirin 325 MG tablet Take 1 tablet (325 mg total) by mouth once daily.    celecoxib (CELEBREX) 100 MG capsule Take 100 mg by mouth once daily.     citalopram (CELEXA) 20 MG tablet Take 20 mg by mouth once daily.     fish oil-dha-epa (FISH OIL) 1,200-144-216 mg Cap Take 1 capsule by mouth 2 (two) times daily.     labetalol (NORMODYNE) 200 MG tablet Take 200 mg by mouth 2 (two) times daily. Twice a day    potassium chloride SA (K-DUR,KLOR-CON) 20 MEQ tablet Take 20 mEq by mouth 2 (two) times daily.     pravastatin (PRAVACHOL) 40 MG tablet Take 40 mg by mouth once daily. Every morning    spironolactone (ALDACTONE) 100 MG tablet Take 100 mg by mouth once daily.     tamsulosin  (FLOMAX) 0.4 mg Cp24 Take 2 capsules (0.8 mg total) by mouth every evening. Every day    tiZANidine (ZANAFLEX) 4 MG tablet TAKE 1 TABLET(4 MG) BY MOUTH TWICE DAILY    mupirocin (BACTROBAN) 2 % ointment 1 g by Nasal route 2 (two) times daily. (Patient not taking: Reported on 12/16/2019)    senna-docusate 8.6-50 mg (PERICOLACE) 8.6-50 mg per tablet Take 1 tablet by mouth 2 (two) times daily. (Patient not taking: Reported on 12/16/2019)     No current facility-administered medications for this visit.        Review of patient's allergies indicates:   Allergen Reactions    Iodinated contrast media Itching       Family History   Problem Relation Age of Onset    Benign prostatic hyperplasia Brother     Heart disease Mother     Kidney disease Mother     Arthritis Mother     Mental illness Father 84        dementia    Diabetes Father     Hypertension Father     Heart disease Father     Cancer Father 75        colon    Prostate cancer Neg Hx     Urolithiasis Neg Hx     Kidney cancer Neg Hx        Social History     Socioeconomic History    Marital status:      Spouse name: Not on file    Number of children: Not on file    Years of education: Not on file    Highest education level: Not on file   Occupational History    Not on file   Social Needs    Financial resource strain: Not on file    Food insecurity:     Worry: Not on file     Inability: Not on file    Transportation needs:     Medical: Not on file     Non-medical: Not on file   Tobacco Use    Smoking status: Never Smoker    Smokeless tobacco: Never Used   Substance and Sexual Activity    Alcohol use: No    Drug use: No    Sexual activity: Not on file   Lifestyle    Physical activity:     Days per week: Not on file     Minutes per session: Not on file    Stress: Not on file   Relationships    Social connections:     Talks on phone: Not on file     Gets together: Not on file     Attends Sikhism service: Not on file     Active  member of club or organization: Not on file     Attends meetings of clubs or organizations: Not on file     Relationship status: Not on file   Other Topics Concern    Not on file   Social History Narrative    Not on file       History of present illness:  ID is a total shoulder left September.  He is having difficulty with power particularly abduction power sounds like rotator cuff type of issues.  He has rehabbed to get good forward range of motion but does not have power for overhead activities.  There is some discomfort but not severe      Review of Systems:    Constitution: Negative for chills, fever, and sweats.  Negative for unexplained weight loss.    HENT:  Negative for headaches and blurry vision.    Cardiovascular:Negative for chest pain or irregular heart beat. Negative for hypertension.    Respiratory:  Negative for cough and shortness of breath.    Gastrointestinal: Negative for abdominal pain, heartburn, melena, nausea, and vomitting.    Genitourinary:  Negative bladder incontinence and dysuria.    Musculoskeletal:  See HPI for details.     Neurological: Negative for numbness.    Psychiatric/Behavioral: Negative for depression.  The patient is not nervous/anxious.      Endocrine: Negative for polyuria    Hematologic/Lymphatic: Negative for bleeding problem.  Does not bruise/bleed easily.    Skin: Negative for poor would healing and rash    Objective:      Physical Examination:    Vital Signs:    Vitals:    02/03/20 1205   BP: 115/69   Pulse: 76       Body mass index is 34.87 kg/m².    This a well-developed, well nourished patient in no acute distress.  They are alert and oriented and cooperative to examination.        So physical exam shows a passively we can get very good forward flexion and we get abduction to 90 but he cannot hold it abducted at 90°.  He has pain on the way down he has a grade 2-3 abduction power.  Rotation fairly normal no pain slightly limited behind his back.  Does not have  numbness around the shoulder.  Pertinent New Results:    XRAY Report / Interpretation:   AP x-ray left shoulder shows a total shoulder arthroplasty in good position there is no superior migration the humeral component looks to be appropriate length. No irregularities around the tuberosity.  Glenoid component appears to be in good position. Electronically Signed By Abad Miller JR, MD    Assessment/Plan:      So my impression he has got clearly abduction issues this could be rotator cuff could be axillary nerve injury.  Plan is nerve conduction checked the axillary nerve and then need to see the rotator cuff so an MRI with metal subtraction to see if we can identify the rotator cuff tissue.  There is no superior migration today but he act mechanically as if the rotator cuff may have a problem.  He has because he has good passive motion I do not know that future therapy would help him so we will hold therapy for now      This note was created using Dragon voice recognition software that occasionally misinterpreted phrases or words.

## 2020-02-10 ENCOUNTER — HOSPITAL ENCOUNTER (OUTPATIENT)
Dept: RADIOLOGY | Facility: HOSPITAL | Age: 70
Discharge: HOME | End: 2020-02-10
Attending: ORTHOPAEDIC SURGERY
Payer: MEDICARE

## 2020-02-10 DIAGNOSIS — Z96.612 HISTORY OF TOTAL SHOULDER REPLACEMENT, LEFT: ICD-10-CM

## 2020-02-10 DIAGNOSIS — S44.32XA INJURY OF LEFT AXILLARY NERVE, INITIAL ENCOUNTER: ICD-10-CM

## 2020-02-10 DIAGNOSIS — M75.102 NONTRAUMATIC TEAR OF LEFT ROTATOR CUFF, UNSPECIFIED TEAR EXTENT: ICD-10-CM

## 2020-02-10 PROCEDURE — 73221 MRI JOINT UPR EXTREM W/O DYE: CPT | Mod: TC,PO,LT

## 2020-02-14 ENCOUNTER — OFFICE VISIT (OUTPATIENT)
Dept: PHYSICAL MEDICINE AND REHAB | Facility: CLINIC | Age: 70
End: 2020-02-14
Payer: MEDICARE

## 2020-02-14 DIAGNOSIS — Z96.612 HISTORY OF TOTAL SHOULDER REPLACEMENT, LEFT: ICD-10-CM

## 2020-02-14 DIAGNOSIS — G56.02 CARPAL TUNNEL SYNDROME, LEFT: ICD-10-CM

## 2020-02-14 DIAGNOSIS — S44.32XA INJURY OF LEFT AXILLARY NERVE, INITIAL ENCOUNTER: ICD-10-CM

## 2020-02-14 DIAGNOSIS — G56.82 SUPRASCAPULAR NEUROPATHY, LEFT: Primary | ICD-10-CM

## 2020-02-14 PROCEDURE — 95886 PR EMG COMPLETE, W/ NERVE CONDUCTION STUDIES, 5+ MUSCLES: ICD-10-PCS | Mod: S$GLB,,, | Performed by: PHYSICAL MEDICINE & REHABILITATION

## 2020-02-14 PROCEDURE — 99499 UNLISTED E&M SERVICE: CPT | Mod: S$GLB,,, | Performed by: PHYSICAL MEDICINE & REHABILITATION

## 2020-02-14 PROCEDURE — 95909 PR NERVE CONDUCTION STUDY; 5-6 STUDIES: ICD-10-PCS | Mod: S$GLB,,, | Performed by: PHYSICAL MEDICINE & REHABILITATION

## 2020-02-14 PROCEDURE — 95909 NRV CNDJ TST 5-6 STUDIES: CPT | Mod: S$GLB,,, | Performed by: PHYSICAL MEDICINE & REHABILITATION

## 2020-02-14 PROCEDURE — 95886 MUSC TEST DONE W/N TEST COMP: CPT | Mod: S$GLB,,, | Performed by: PHYSICAL MEDICINE & REHABILITATION

## 2020-02-14 PROCEDURE — 99499 NO LOS: ICD-10-PCS | Mod: S$GLB,,, | Performed by: PHYSICAL MEDICINE & REHABILITATION

## 2020-02-14 NOTE — PROCEDURES
Procedures         [facility]  Physical Medicine and Rehabilitation   [address] [place] [address], [address]  [place] [address]             Full Name: Sammy Muniz Patient ID: [id number]      Visit Date: 2/14/2020 07:47  Examining Physician: Kenyon Owusu D.O.       Sensory NCS      Nerve / Sites Rec. Site Onset Lat Peak Lat NP Amp Segments Distance Velocity     ms ms µV  cm m/s   L Median - Digit II (Antidromic)      Wrist Dig II 3.13 4.11 13.4 Wrist - Dig II 14 45   L Ulnar - Digit V (Antidromic)      Wrist Dig V 2.50 3.39 2.9 Wrist - Dig V 14 56   L Radial - Anatomical snuff box (Forearm)      Forearm Wrist 1.61 2.29 10.1 Forearm - Wrist 10 62       Motor NCS      Nerve / Sites Muscle Latency Amplitude Amp % Duration Segments Distance Lat Diff Velocity     ms mV % ms  cm ms m/s   L Median - APB      Wrist APB 4.58 1.6 100 3.39 Elbow - Wrist 19 5.21 36      Elbow APB 9.79 0.2 14.6 5.78       L Ulnar - ADM      Wrist ADM 2.92 8.2 100 5.73 Wrist - ADM 8        B.Elbow ADM 6.82 4.9 60.1 5.99 B.Elbow - Wrist 21 3.91 54      A.Elbow ADM 8.39 5.6 68.5 6.20 A.Elbow - B.Elbow 8 1.56 51       Motor NCS      Nerve / Sites Muscle Latency Amplitude Amp % Duration Segments Distance Lat Diff Velocity     ms mV % ms  cm ms m/s   L Radial - EIP      Forearm EIP 2.86 5.2 100 6.35 Forearm - EIP         Elbow EIP 4.38 3.6 69.5 7.55 Elbow - Forearm 12 1.51 79       EMG Summary Table     Spontaneous MUAP Recruitment   Muscle IA Fib PSW Fasc Other Amp Dur. PPP Pattern   L. Deltoid N None None None . N N N N   L. Biceps brachii N None None None . N N N N   L. Infraspinatus 1+ None None None . N 2+ 2+ Reduced   L. Supraspinatus N None None None . 2+ 2+ N Reduced   L. Triceps brachii N None None None . N N N N   L. Pronator teres N None None None . N N N N   L. Abductor pollicis brevis N None None None . N N N N   L. First dorsal interosseous N None None None . N N N N   L. Cervical paraspinals N None None None . N N N N        Summary    The motor conduction test was performed on 3 nerve(s). The results were normal in 1 nerve(s): L Ulnar - ADM. Findings were unremarkable in 1 nerve(s): L Radial - EIP. Results outside the specified normal range were found in 1 nerve(s), as follows:   In the L Median - APB study  o the take off latency result was increased for Wrist stimulation  o the peak amplitude result was reduced for Wrist stimulation  o the take off velocity result was reduced for Elbow - Wrist segment    The sensory conduction test had results outside of the specified normal range in all 3 of the tested nerves:   In the L Median - Digit II (Antidromic) study  o the peak latency result was increased for Wrist stimulation  o the peak amplitude result was reduced for Wrist stimulation   In the L Ulnar - Digit V (Antidromic) study  o the peak amplitude result was reduced for Wrist stimulation   In the L Radial - Anatomical snuff box (Forearm) study  o the peak amplitude result was reduced for Forearm stimulation    The needle EMG examination was performed in 9 muscles. It was normal in 7 muscle(s): L. Deltoid, L. Biceps brachii, L. Triceps brachii, L. Pronator teres, L. Abductor pollicis brevis, L. First dorsal interosseous, L. Cervical paraspinals. The study was abnormal in 2 muscle(s), with the following distribution:   Abnormal spontaneous/insertional activity was found in L. Infraspinatus.   The MUP waveform abnormality was found in L. Infraspinatus, L. Supraspinatus.   Abnormal interference pattern was found in L. Infraspinatus, L. Supraspinatus.          Impression:  Abnormal examination.  There is electrodiagnostic evidence of:  1. A chronic left suprascapular neuropathy with evidence of reinnervation in the supraspinatus and infraspinatus muscles.  There is no evidence of active denervation.  2. 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 There is no evidence of cervical radiculopathy in the muscles tested of the left upper extremity and associated paraspinals.      Clinical history:  The chief complaint of left shoulder pain and weakness is possibly secondary to a chronic suprascapular neuropathy.  This EMG pattern is not uncommon after total shoulder arthroplasty likely secondary to manipulation of the soft tissues. Mr. Muniz could potentially benefit from a suprascapular nerve block for diagnostic and therapeutic purposes and assess response in pain and function.    I performed a diagnostic ultrasound examination of the left shoulder which showed attachment of the supraspinatus and infraspinatus tendons on the greater tuberosity.  The subscapularis tendon appeared irregular at the attachment on the lesser tuberosity.  The long head of the biceps tendon was not visualized.  There is no evidence of external compression on the suprascapular notch.      He also has the incidental finding of moderate to severe left carpal tunnel syndrome.      ____________________________  Kenyon Owusu D.O.  Board-Certified by the American Board of Physical Medicine and Rehabilitation  Board-Certified by the American Board of Electrodiagnostic Medicine

## 2020-02-14 NOTE — LETTER
February 14, 2020      Abad Miller Jr., MD  [address] [facility]  [address]  [place] [address]           [place] [facility]  [address] [place] [address] [address]  [place] [address]  Phone: [phone]  Fax: [phone]          Patient: Sammy Muniz   MR Number: [id number]   YOB: 1950   Date of Visit: 2/14/2020       Dear Dr. Abad Miller Jr.:    Thank you for referring Sammy Muniz to me for evaluation. Attached you will find relevant portions of my assessment and plan of care.    If you have questions, please do not hesitate to call me. I look forward to following Sammy Muniz along with you.    Sincerely,    Kenyon Owusu MD    Enclosure  CC:  No Recipients    If you would like to receive this communication electronically, please contact [email or web address] or [phone] to request more information on [employer] access.    For providers and/or their staff who would like to refer a patient to [facility], please contact us through our one-stop-shop provider referral line, [facility] [place], at [phone].    If you feel you have received this communication in error or would no longer like to receive these types of communications, please e-mail [email or web address]

## 2020-02-17 ENCOUNTER — OFFICE VISIT (OUTPATIENT)
Dept: ORTHOPEDICS | Facility: CLINIC | Age: 70
End: 2020-02-17
Payer: MEDICARE

## 2020-02-17 VITALS
SYSTOLIC BLOOD PRESSURE: 128 MMHG | HEIGHT: 71 IN | WEIGHT: 250 LBS | HEART RATE: 75 BPM | DIASTOLIC BLOOD PRESSURE: 79 MMHG | BODY MASS INDEX: 35 KG/M2

## 2020-02-17 DIAGNOSIS — G56.82 SUPRASCAPULAR NEUROPATHY, LEFT: ICD-10-CM

## 2020-02-17 DIAGNOSIS — Z96.612 HISTORY OF TOTAL SHOULDER REPLACEMENT, LEFT: Primary | ICD-10-CM

## 2020-02-17 PROCEDURE — 1159F MED LIST DOCD IN RCRD: CPT | Mod: S$GLB,,, | Performed by: ORTHOPAEDIC SURGERY

## 2020-02-17 PROCEDURE — 3074F SYST BP LT 130 MM HG: CPT | Mod: S$GLB,,, | Performed by: ORTHOPAEDIC SURGERY

## 2020-02-17 PROCEDURE — 3074F PR MOST RECENT SYSTOLIC BLOOD PRESSURE < 130 MM HG: ICD-10-PCS | Mod: S$GLB,,, | Performed by: ORTHOPAEDIC SURGERY

## 2020-02-17 PROCEDURE — 1101F PT FALLS ASSESS-DOCD LE1/YR: CPT | Mod: S$GLB,,, | Performed by: ORTHOPAEDIC SURGERY

## 2020-02-17 PROCEDURE — 99213 PR OFFICE/OUTPT VISIT, EST, LEVL III, 20-29 MIN: ICD-10-PCS | Mod: S$GLB,,, | Performed by: ORTHOPAEDIC SURGERY

## 2020-02-17 PROCEDURE — 1101F PR PT FALLS ASSESS DOC 0-1 FALLS W/OUT INJ PAST YR: ICD-10-PCS | Mod: S$GLB,,, | Performed by: ORTHOPAEDIC SURGERY

## 2020-02-17 PROCEDURE — 3078F DIAST BP <80 MM HG: CPT | Mod: S$GLB,,, | Performed by: ORTHOPAEDIC SURGERY

## 2020-02-17 PROCEDURE — 99213 OFFICE O/P EST LOW 20 MIN: CPT | Mod: S$GLB,,, | Performed by: ORTHOPAEDIC SURGERY

## 2020-02-17 PROCEDURE — 1125F PR PAIN SEVERITY QUANTIFIED, PAIN PRESENT: ICD-10-PCS | Mod: S$GLB,,, | Performed by: ORTHOPAEDIC SURGERY

## 2020-02-17 PROCEDURE — 1159F PR MEDICATION LIST DOCUMENTED IN MEDICAL RECORD: ICD-10-PCS | Mod: S$GLB,,, | Performed by: ORTHOPAEDIC SURGERY

## 2020-02-17 PROCEDURE — 3078F PR MOST RECENT DIASTOLIC BLOOD PRESSURE < 80 MM HG: ICD-10-PCS | Mod: S$GLB,,, | Performed by: ORTHOPAEDIC SURGERY

## 2020-02-17 PROCEDURE — 1125F AMNT PAIN NOTED PAIN PRSNT: CPT | Mod: S$GLB,,, | Performed by: ORTHOPAEDIC SURGERY

## 2020-02-17 NOTE — PROGRESS NOTES
[facility]    Subjective:     Chief Complaint:   Chief Complaint   Patient presents with    Left Shoulder - Pain     Left shoulder TSA 9.24.19. F/u MRI/NCS States that is pain is about the same no change.       Past Medical History:   Diagnosis Date    Arthritis     left knee; right shoulder    Bladder tumor     BPH (benign prostatic hyperplasia)     Hypertension     Sleep apnea     Doews not use C-pap       Past Surgical History:   Procedure Laterality Date    ARTHROPLASTY OF SHOULDER Left 9/24/2019    Procedure: ARTHROPLASTY, SHOULDER;  Surgeon: Abad Miller Jr., MD;  Location: [facility];  Service: Orthopedics;  Laterality: Left;  [employer] Lopes notified 9/20-tcb    CERVICAL FUSION      CYSTOSCOPY      GASTRIC BYPASS  2012    HERNIA REPAIR      ventral hernia    JOINT REPLACEMENT      Bilateral knees & Rt shoulder    PROSTATE SURGERY      greenlight laser    TONSILLECTOMY      TOTAL KNEE ARTHROPLASTY  left    TOTAL SHOULDER ARTHROPLASTY  right    TYMPANOPLASTY  left    VASECTOMY         Current Outpatient Medications   Medication Sig    amlodipine-valsartan (EXFORGE)  mg per tablet Take 1 tablet by mouth once daily.     aspirin 325 MG tablet Take 1 tablet (325 mg total) by mouth once daily.    celecoxib (CELEBREX) 100 MG capsule Take 100 mg by mouth once daily.     citalopram (CELEXA) 20 MG tablet Take 20 mg by mouth once daily.     fish oil-dha-epa (FISH OIL) 1,200-144-216 mg Cap Take 1 capsule by mouth 2 (two) times daily.     labetalol (NORMODYNE) 200 MG tablet Take 200 mg by mouth 2 (two) times daily. Twice a day    potassium chloride SA (K-DUR,KLOR-CON) 20 MEQ tablet Take 20 mEq by mouth 2 (two) times daily.     pravastatin (PRAVACHOL) 40 MG tablet Take 40 mg by mouth once daily. Every morning    spironolactone (ALDACTONE) 100 MG tablet Take 100 mg by mouth once daily.     tamsulosin (FLOMAX) 0.4 mg Cp24 Take 2 capsules (0.8 mg total) by mouth every evening.  Every day     No current facility-administered medications for this visit.        Review of patient's allergies indicates:   Allergen Reactions    Iodinated contrast media Itching       Family History   Problem Relation Age of Onset    Benign prostatic hyperplasia Brother     Heart disease Mother     Kidney disease Mother     Arthritis Mother     Mental illness Father 84        dementia    Diabetes Father     Hypertension Father     Heart disease Father     Cancer Father 75        colon    Prostate cancer Neg Hx     Urolithiasis Neg Hx     Kidney cancer Neg Hx        Social History     Socioeconomic History    Marital status:      Spouse name: Not on file    Number of children: Not on file    Years of education: Not on file    Highest education level: Not on file   Occupational History    Not on file   Social Needs    Financial resource strain: Not on file    Food insecurity:     Worry: Not on file     Inability: Not on file    Transportation needs:     Medical: Not on file     Non-medical: Not on file   Tobacco Use    Smoking status: Never Smoker    Smokeless tobacco: Never Used   Substance and Sexual Activity    Alcohol use: No    Drug use: No    Sexual activity: Not on file   Lifestyle    Physical activity:     Days per week: Not on file     Minutes per session: Not on file    Stress: Not on file   Relationships    Social connections:     Talks on phone: Not on file     Gets together: Not on file     Attends Taoist service: Not on file     Active member of club or organization: Not on file     Attends meetings of clubs or organizations: Not on file     Relationship status: Not on file   Other Topics Concern    Not on file   Social History Narrative    Not on file       History of present illness:  So we have a MRI left shoulder and we are concerned about abduction weakness after a anatomic total shoulder.  There is some mild fatty atrophy in the rotator cuff muscle the  rotator cuff itself is not obviously ruptured but hard to interpret.  We have a nerve conduction indicates axillary nerve is working but the suprascapular nerve had chronic changes and reinnervation without active denervation.  They neurologist also did a ultrasound and confirm the rotator cuff appeared to have connected in the supraspinatus and irregular in the subscapularis area consistent with our subscapularis repair. So that information indicates that the majority the weakness is rotator cuff but is coming from the suprascapular nerve which is returning.  The patient indicates he has a little bit of improvement in his power but not normal.      Review of Systems:    Constitution: Negative for chills, fever, and sweats.  Negative for unexplained weight loss.    HENT:  Negative for headaches and blurry vision.    Cardiovascular:Negative for chest pain or irregular heart beat. Negative for hypertension.    Respiratory:  Negative for cough and shortness of breath.    Gastrointestinal: Negative for abdominal pain, heartburn, melena, nausea, and vomitting.    Genitourinary:  Negative bladder incontinence and dysuria.    Musculoskeletal:  See HPI for details.     Neurological: Negative for numbness.    Psychiatric/Behavioral: Negative for depression.  The patient is not nervous/anxious.      Endocrine: Negative for polyuria    Hematologic/Lymphatic: Negative for bleeding problem.  Does not bruise/bleed easily.    Skin: Negative for poor would healing and rash    Objective:      Physical Examination:    Vital Signs:    Vitals:    02/17/20 0809   BP: 128/79   Pulse: 75       Body mass index is 34.87 kg/m².    This a well-developed, well nourished patient in no acute distress.  They are alert and oriented and cooperative to examination.        So physical exam shows he can now abduct to about 90 forward but not abducted of 90.  At his side. He still has weakness but reasonable passive range of motion.  There is still  weakness to abduction is still his biggest issue.  His passive range of motion is reasonable.  But he does feel some improvement in this forward flexion.  Pertinent New Results:    XRAY Report / Interpretation:     Assessment/Plan:      So based on the subscapularis nerve out and returning with an intact rotator cuff as best we can determine that I would wait and most likely he will get some further return of his rotator cuff function as a subscapularis nerve get stronger.  We do not have any major findings in the subscapularis notch region on MRI.  He will do stretching at home to continue his motion see him back in 3 months to see what kind of function he has.  I do not think there is reason to convert to a reverse at this time      This note was created using Dragon voice recognition software that occasionally misinterpreted phrases or words.

## 2020-08-12 ENCOUNTER — OFFICE VISIT (OUTPATIENT)
Dept: PODIATRY | Facility: CLINIC | Age: 70
End: 2020-08-12
Payer: MEDICARE

## 2020-08-12 ENCOUNTER — HOSPITAL ENCOUNTER (OUTPATIENT)
Dept: RADIOLOGY | Facility: CLINIC | Age: 70
Discharge: HOME | End: 2020-08-12
Attending: PODIATRIST
Payer: MEDICARE

## 2020-08-12 VITALS
RESPIRATION RATE: 16 BRPM | TEMPERATURE: 100 F | DIASTOLIC BLOOD PRESSURE: 77 MMHG | HEART RATE: 71 BPM | BODY MASS INDEX: 36.12 KG/M2 | HEIGHT: 71 IN | WEIGHT: 258 LBS | SYSTOLIC BLOOD PRESSURE: 121 MMHG

## 2020-08-12 DIAGNOSIS — M79.672 LEFT FOOT PAIN: ICD-10-CM

## 2020-08-12 DIAGNOSIS — M76.72 PERONEAL TENDINITIS OF LEFT LOWER EXTREMITY: Primary | ICD-10-CM

## 2020-08-12 PROCEDURE — 3008F BODY MASS INDEX DOCD: CPT | Mod: S$GLB,,, | Performed by: PODIATRIST

## 2020-08-12 PROCEDURE — 1101F PR PT FALLS ASSESS DOC 0-1 FALLS W/OUT INJ PAST YR: ICD-10-PCS | Mod: S$GLB,,, | Performed by: PODIATRIST

## 2020-08-12 PROCEDURE — 73630 XR FOOT COMPLETE 3 VIEW LEFT: ICD-10-PCS | Mod: LT,S$GLB,, | Performed by: PODIATRIST

## 2020-08-12 PROCEDURE — 1101F PT FALLS ASSESS-DOCD LE1/YR: CPT | Mod: S$GLB,,, | Performed by: PODIATRIST

## 2020-08-12 PROCEDURE — 3074F SYST BP LT 130 MM HG: CPT | Mod: S$GLB,,, | Performed by: PODIATRIST

## 2020-08-12 PROCEDURE — 99203 OFFICE O/P NEW LOW 30 MIN: CPT | Mod: S$GLB,,, | Performed by: PODIATRIST

## 2020-08-12 PROCEDURE — 1125F PR PAIN SEVERITY QUANTIFIED, PAIN PRESENT: ICD-10-PCS | Mod: S$GLB,,, | Performed by: PODIATRIST

## 2020-08-12 PROCEDURE — 73630 X-RAY EXAM OF FOOT: CPT | Mod: LT,S$GLB,, | Performed by: PODIATRIST

## 2020-08-12 PROCEDURE — 1125F AMNT PAIN NOTED PAIN PRSNT: CPT | Mod: S$GLB,,, | Performed by: PODIATRIST

## 2020-08-12 PROCEDURE — 1159F PR MEDICATION LIST DOCUMENTED IN MEDICAL RECORD: ICD-10-PCS | Mod: S$GLB,,, | Performed by: PODIATRIST

## 2020-08-12 PROCEDURE — 3008F PR BODY MASS INDEX (BMI) DOCUMENTED: ICD-10-PCS | Mod: S$GLB,,, | Performed by: PODIATRIST

## 2020-08-12 PROCEDURE — 3078F PR MOST RECENT DIASTOLIC BLOOD PRESSURE < 80 MM HG: ICD-10-PCS | Mod: S$GLB,,, | Performed by: PODIATRIST

## 2020-08-12 PROCEDURE — 1159F MED LIST DOCD IN RCRD: CPT | Mod: S$GLB,,, | Performed by: PODIATRIST

## 2020-08-12 PROCEDURE — 99203 PR OFFICE/OUTPT VISIT, NEW, LEVL III, 30-44 MIN: ICD-10-PCS | Mod: S$GLB,,, | Performed by: PODIATRIST

## 2020-08-12 PROCEDURE — 3074F PR MOST RECENT SYSTOLIC BLOOD PRESSURE < 130 MM HG: ICD-10-PCS | Mod: S$GLB,,, | Performed by: PODIATRIST

## 2020-08-12 PROCEDURE — 3078F DIAST BP <80 MM HG: CPT | Mod: S$GLB,,, | Performed by: PODIATRIST

## 2020-08-12 RX ORDER — PANTOPRAZOLE SODIUM 40 MG/1
TABLET, DELAYED RELEASE ORAL
COMMUNITY
Start: 2020-06-10 | End: 2021-03-23 | Stop reason: SDUPTHER

## 2020-08-12 RX ORDER — METHYLPREDNISOLONE 4 MG/1
TABLET ORAL
Qty: 1 PACKAGE | Refills: 0 | Status: SHIPPED | OUTPATIENT
Start: 2020-08-12 | End: 2021-03-11 | Stop reason: ALTCHOICE

## 2020-08-12 NOTE — PROGRESS NOTES
[address] [facility] [address]  [place] [address]  Phone: [phone]   [phone]    Patient's PCP:Phill Balderrama MD  Referring Provider: No ref. provider found    Subjective:      Chief Complaint:: Foot Pain (left top of foot pain)    HPI  Sammy Muniz is a 69 y.o. male who presents with a complaint of pain to lateral aspect of left foot lasting for two months. Onset of the symptoms was throbbing pain.  Current symptoms include throbbing pain lateral aspect of foot, increased pain at night. Symptoms have progressed.Treatment to date have included aleve Patients rates pain 6/10 on pain scale.    Systemic Doctor: Phill Balderrama MD  Date Last Seen: June 2020 Telemedicine visit    Vitals:    08/12/20 1516   BP: 121/77   Pulse: 71   Resp: 16   Temp: 99.7 °F (37.6 °C)     Shoe Size: 11-11.5    Past Surgical History:   Procedure Laterality Date    ARTHROPLASTY OF SHOULDER Left 9/24/2019    Procedure: ARTHROPLASTY, SHOULDER;  Surgeon: Abad Miller Jr., MD;  Location: [facility];  Service: Orthopedics;  Laterality: Left;  [employer] Lopes notified 9/20-tcb    CERVICAL FUSION      CYSTOSCOPY      GASTRIC BYPASS  2012    HERNIA REPAIR      ventral hernia    JOINT REPLACEMENT      Bilateral knees & Rt shoulder    PROSTATE SURGERY      greenlight laser    TONSILLECTOMY      TOTAL KNEE ARTHROPLASTY  left    TOTAL SHOULDER ARTHROPLASTY  right    TYMPANOPLASTY  left    VASECTOMY       Past Medical History:   Diagnosis Date    Arthritis     left knee; right shoulder    Bladder tumor     BPH (benign prostatic hyperplasia)     Hypertension     Sleep apnea     Doews not use C-pap     Family History   Problem Relation Age of Onset    Benign prostatic hyperplasia Brother     Heart disease Mother     Kidney disease Mother     Arthritis Mother     Mental illness Father 84        dementia    Diabetes Father     Hypertension Father     Heart disease Father     Cancer Father 75        colon     Prostate cancer Neg Hx     Urolithiasis Neg Hx     Kidney cancer Neg Hx         Social History:   Marital Status:   Alcohol History:  reports no history of alcohol use.  Tobacco History:  reports that he has never smoked. He has never used smokeless tobacco.  Drug History:  reports no history of drug use.    Review of patient's allergies indicates:   Allergen Reactions    Iodinated contrast media Itching       Current Outpatient Medications   Medication Sig Dispense Refill    amlodipine-valsartan (EXFORGE)  mg per tablet Take 1 tablet by mouth once daily.       aspirin 325 MG tablet Take 1 tablet (325 mg total) by mouth once daily.  0    celecoxib (CELEBREX) 100 MG capsule Take 100 mg by mouth once daily.       citalopram (CELEXA) 20 MG tablet Take 20 mg by mouth once daily.       fish oil-dha-epa (FISH OIL) 1,200-144-216 mg Cap Take 1 capsule by mouth 2 (two) times daily.       labetalol (NORMODYNE) 200 MG tablet Take 200 mg by mouth 2 (two) times daily. Twice a day      methylPREDNISolone (MEDROL DOSEPACK) 4 mg tablet use as directed 1 Package 0    pantoprazole (PROTONIX) 40 MG tablet TK 1 T PO QD      potassium chloride SA (K-DUR,KLOR-CON) 20 MEQ tablet Take 20 mEq by mouth 2 (two) times daily.       pravastatin (PRAVACHOL) 40 MG tablet Take 40 mg by mouth once daily. Every morning      spironolactone (ALDACTONE) 100 MG tablet Take 100 mg by mouth once daily.       tamsulosin (FLOMAX) 0.4 mg Cp24 Take 2 capsules (0.8 mg total) by mouth every evening. Every day 90 capsule 3     No current facility-administered medications for this visit.        Review of Systems      Objective:        Physical Exam:   Foot Exam    General  General Appearance: appears stated age and healthy   Orientation: alert and oriented to person, place, and time   Affect: appropriate   Gait: unimpaired       Left Foot/Ankle      Inspection and Palpation  Ecchymosis: none  Tenderness: (Tenderness to palpation along  the distal peroneus brevis tendon)  Swelling: (Mild edema of the lower extremity)  Arch: normal  Skin Exam: skin intact;     Neurovascular  Dorsalis pedis: 2+  Posterior tibial: 2+  Saphenous nerve sensation: normal  Tibial nerve sensation: normal  Superficial peroneal nerve sensation: normal  Deep peroneal nerve sensation: normal  Sural nerve sensation: normal    Muscle Strength  Ankle dorsiflexion: 5  Ankle plantar flexion: 5  Ankle inversion: 5  Ankle eversion: 5  Great toe extension: 5  Great toe flexion: 5    Range of Motion    Passive  Ankle inversion: pain  1st MTP extension: 10  1st MTP flexion: 5            Physical Exam   Cardiovascular:   Pulses:       Dorsalis pedis pulses are 2+ on the left side.        Posterior tibial pulses are 2+ on the left side.       Imaging: X-ray 3 views of the left foot were taken AP, lateral, and oblique, weight bearing showing:  No fractures or dislocations.  Significant degenerative changes noted at the 1st metatarsophalangeal joint.  Mild tailor's bunion deformities present.  No bone tumors or soft tissue masses.      Electronically Signed by: Parish Yi DPM             Assessment:       1. Peroneal tendinitis of left lower extremity    2. Left foot pain      Plan:   Peroneal tendinitis of left lower extremity  -     methylPREDNISolone (MEDROL DOSEPACK) 4 mg tablet; use as directed  Dispense: 1 Package; Refill: 0    Left foot pain  -     X-Ray Foot Complete Left  -     methylPREDNISolone (MEDROL DOSEPACK) 4 mg tablet; use as directed  Dispense: 1 Package; Refill: 0      Follow up if symptoms worsen or fail to improve.    Procedures - None    Counseling:     I provided patient education verbally regarding:   Patient diagnosis, treatment options, as well as alternatives, risks, and benefits.     Treatment of tendonitis with rest, ice, oral NSAID, topical antiinflammatory creams, cam walker boot if needed, and MRI if needed.      This note was created using Dragon voice  recognition software that occasionally misinterpreted phrases or words.

## 2020-08-26 LAB
CHOLEST SERPL-MSCNC: 164 MG/DL (ref 0–200)
HDLC SERPL-MCNC: 41 MG/DL
LDLC SERPL CALC-MCNC: 94 MG/DL
TRIGL SERPL-MCNC: 191 MG/DL

## 2020-11-16 ENCOUNTER — TELEPHONE (OUTPATIENT)
Dept: CARDIOLOGY | Facility: CLINIC | Age: 70
End: 2020-11-16

## 2020-11-16 NOTE — TELEPHONE ENCOUNTER
----- Message from Lee Low sent at 11/16/2020  2:22 PM CST -----  Regarding: sob  Short winded SOB fatigue     [phone]

## 2020-11-16 NOTE — TELEPHONE ENCOUNTER
lmom asking pt to return my call go to er or get medical attention if sx's worsen left contact info on his vmail to call me back

## 2020-12-03 ENCOUNTER — OFFICE VISIT (OUTPATIENT)
Dept: ORTHOPEDICS | Facility: CLINIC | Age: 70
End: 2020-12-03
Payer: MEDICARE

## 2020-12-03 VITALS
SYSTOLIC BLOOD PRESSURE: 148 MMHG | BODY MASS INDEX: 36.51 KG/M2 | WEIGHT: 255 LBS | HEART RATE: 78 BPM | HEIGHT: 70 IN | DIASTOLIC BLOOD PRESSURE: 88 MMHG

## 2020-12-03 DIAGNOSIS — Z96.612 HISTORY OF TOTAL SHOULDER REPLACEMENT, LEFT: Primary | ICD-10-CM

## 2020-12-03 DIAGNOSIS — Z96.651 HISTORY OF PROSTHETIC UNICOMPARTMENTAL ARTHROPLASTY OF RIGHT KNEE: ICD-10-CM

## 2020-12-03 DIAGNOSIS — Z96.652 HISTORY OF TOTAL KNEE ARTHROPLASTY, LEFT: ICD-10-CM

## 2020-12-03 DIAGNOSIS — M25.562 ACUTE PAIN OF BOTH KNEES: ICD-10-CM

## 2020-12-03 DIAGNOSIS — M25.561 ACUTE PAIN OF BOTH KNEES: ICD-10-CM

## 2020-12-03 PROCEDURE — 3079F DIAST BP 80-89 MM HG: CPT | Mod: S$GLB,,, | Performed by: ORTHOPAEDIC SURGERY

## 2020-12-03 PROCEDURE — 1125F AMNT PAIN NOTED PAIN PRSNT: CPT | Mod: S$GLB,,, | Performed by: ORTHOPAEDIC SURGERY

## 2020-12-03 PROCEDURE — 1157F ADVNC CARE PLAN IN RCRD: CPT | Mod: S$GLB,,, | Performed by: ORTHOPAEDIC SURGERY

## 2020-12-03 PROCEDURE — 3077F PR MOST RECENT SYSTOLIC BLOOD PRESSURE >= 140 MM HG: ICD-10-PCS | Mod: S$GLB,,, | Performed by: ORTHOPAEDIC SURGERY

## 2020-12-03 PROCEDURE — 3008F PR BODY MASS INDEX (BMI) DOCUMENTED: ICD-10-PCS | Mod: S$GLB,,, | Performed by: ORTHOPAEDIC SURGERY

## 2020-12-03 PROCEDURE — 99213 OFFICE O/P EST LOW 20 MIN: CPT | Mod: S$GLB,,, | Performed by: ORTHOPAEDIC SURGERY

## 2020-12-03 PROCEDURE — 1157F PR ADVANCE CARE PLAN OR EQUIV PRESENT IN MEDICAL RECORD: ICD-10-PCS | Mod: S$GLB,,, | Performed by: ORTHOPAEDIC SURGERY

## 2020-12-03 PROCEDURE — 3077F SYST BP >= 140 MM HG: CPT | Mod: S$GLB,,, | Performed by: ORTHOPAEDIC SURGERY

## 2020-12-03 PROCEDURE — 99213 PR OFFICE/OUTPT VISIT, EST, LEVL III, 20-29 MIN: ICD-10-PCS | Mod: S$GLB,,, | Performed by: ORTHOPAEDIC SURGERY

## 2020-12-03 PROCEDURE — 1159F PR MEDICATION LIST DOCUMENTED IN MEDICAL RECORD: ICD-10-PCS | Mod: S$GLB,,, | Performed by: ORTHOPAEDIC SURGERY

## 2020-12-03 PROCEDURE — 1159F MED LIST DOCD IN RCRD: CPT | Mod: S$GLB,,, | Performed by: ORTHOPAEDIC SURGERY

## 2020-12-03 PROCEDURE — 3008F BODY MASS INDEX DOCD: CPT | Mod: S$GLB,,, | Performed by: ORTHOPAEDIC SURGERY

## 2020-12-03 PROCEDURE — 3079F PR MOST RECENT DIASTOLIC BLOOD PRESSURE 80-89 MM HG: ICD-10-PCS | Mod: S$GLB,,, | Performed by: ORTHOPAEDIC SURGERY

## 2020-12-03 PROCEDURE — 1125F PR PAIN SEVERITY QUANTIFIED, PAIN PRESENT: ICD-10-PCS | Mod: S$GLB,,, | Performed by: ORTHOPAEDIC SURGERY

## 2020-12-03 NOTE — PROGRESS NOTES
[facility]    Subjective:     Chief Complaint:   Chief Complaint   Patient presents with    Right Knee - Pain    Left Knee - Pain    Left Shoulder - Pain     Left TSA 9.2019 by Dr Miller having increased pain with certain movements    Right Knee Unicompartmental 10 yrs Dr Miller   Left TKA 8 yrs  Dr Miller having pain cuauhtemoc knees        Past Medical History:   Diagnosis Date    Arthritis     left knee; right shoulder    Bladder tumor     BPH (benign prostatic hyperplasia)     Hypertension     Sleep apnea     Doews not use C-pap       Past Surgical History:   Procedure Laterality Date    ARTHROPLASTY OF SHOULDER Left 9/24/2019    Procedure: ARTHROPLASTY, SHOULDER;  Surgeon: Abad Miller Jr., MD;  Location: [facility];  Service: Orthopedics;  Laterality: Left;  [employer] L[employer] notified 9/20-tcb    CERVICAL FUSION      CYSTOSCOPY      GASTRIC BYPASS  2012    HERNIA REPAIR      ventral hernia    JOINT REPLACEMENT      Bilateral knees & Rt shoulder    PROSTATE SURGERY      greenlight laser    TONSILLECTOMY      TOTAL KNEE ARTHROPLASTY  left    TOTAL SHOULDER ARTHROPLASTY  right    TYMPANOPLASTY  left    VASECTOMY         Current Outpatient Medications   Medication Sig    amlodipine-valsartan (EXFORGE)  mg per tablet Take 1 tablet by mouth once daily.     celecoxib (CELEBREX) 100 MG capsule Take 100 mg by mouth once daily.     citalopram (CELEXA) 20 MG tablet Take 20 mg by mouth once daily.     fish oil-dha-epa (FISH OIL) 1,200-144-216 mg Cap Take 1 capsule by mouth 2 (two) times daily.     labetalol (NORMODYNE) 200 MG tablet Take 200 mg by mouth 2 (two) times daily. Twice a day    methylPREDNISolone (MEDROL DOSEPACK) 4 mg tablet use as directed    pantoprazole (PROTONIX) 40 MG tablet TK 1 T PO QD    potassium chloride SA (K-DUR,KLOR-CON) 20 MEQ tablet Take 20 mEq by mouth 2 (two) times daily.     pravastatin (PRAVACHOL) 40 MG tablet Take 40 mg by mouth once daily. Every morning     spironolactone (ALDACTONE) 100 MG tablet Take 100 mg by mouth once daily.     tamsulosin (FLOMAX) 0.4 mg Cp24 Take 2 capsules (0.8 mg total) by mouth every evening. Every day    aspirin 325 MG tablet Take 1 tablet (325 mg total) by mouth once daily.     No current facility-administered medications for this visit.        Review of patient's allergies indicates:   Allergen Reactions    Iodinated contrast media Itching    Iodine        Family History   Problem Relation Age of Onset    Benign prostatic hyperplasia Brother     Heart disease Mother     Kidney disease Mother     Arthritis Mother     Mental illness Father 84        dementia    Diabetes Father     Hypertension Father     Heart disease Father     Cancer Father 75        colon    Prostate cancer Neg Hx     Urolithiasis Neg Hx     Kidney cancer Neg Hx        Social History     Socioeconomic History    Marital status:      Spouse name: Not on file    Number of children: Not on file    Years of education: Not on file    Highest education level: Not on file   Occupational History    Not on file   Social Needs    Financial resource strain: Not on file    Food insecurity     Worry: Not on file     Inability: Not on file    Transportation needs     Medical: Not on file     Non-medical: Not on file   Tobacco Use    Smoking status: Never Smoker    Smokeless tobacco: Never Used   Substance and Sexual Activity    Alcohol use: No    Drug use: No    Sexual activity: Not on file   Lifestyle    Physical activity     Days per week: Not on file     Minutes per session: Not on file    Stress: Not on file   Relationships    Social connections     Talks on phone: Not on file     Gets together: Not on file     Attends Zoroastrianism service: Not on file     Active member of club or organization: Not on file     Attends meetings of clubs or organizations: Not on file     Relationship status: Not on file   Other Topics Concern    Not on file    Social History Narrative    Not on file       History of present illness:  Patient comes in today for his bilateral knees and his left shoulder.  In terms of his left shoulder he underwent a left total shoulder by Dr. Miller in September.  He initially did poorly but he has actually started to improve significantly.  He has also undergone bilateral total knees.  The left is a total knee the right is a Uni.  The right bothers him much more than the left.  The right was done about 10 years ago.      Review of Systems:    Constitution: Negative for chills, fever, and sweats.  Negative for unexplained weight loss.    HENT:  Negative for headaches and blurry vision.    Cardiovascular:Negative for chest pain or irregular heart beat. Negative for hypertension.    Respiratory:  Negative for cough and shortness of breath.    Gastrointestinal: Negative for abdominal pain, heartburn, melena, nausea, and vomitting.    Genitourinary:  Negative bladder incontinence and dysuria.    Musculoskeletal:  See HPI for details.     Neurological: Negative for numbness.    Psychiatric/Behavioral: Negative for depression.  The patient is not nervous/anxious.      Endocrine: Negative for polyuria    Hematologic/Lymphatic: Negative for bleeding problem.  Does not bruise/bleed easily.    Skin: Negative for poor would healing and rash    Objective:      Physical Examination:    Vital Signs:    Vitals:    12/03/20 0848   BP: (!) 148/88   Pulse: 78       Body mass index is 36.59 kg/m².    This a well-developed, well nourished patient in no acute distress.  They are alert and oriented and cooperative to examination.        Patient get his left hand way up over his head.  He has 50° of external rotation.  Internal rotation is to the buttock.  He has bilateral range of motion of his knee 0-115 degrees.  He has a 2+ effusion on the right.  The right knee has a varus deformity.  Pertinent New Results:    XRAY Report / Interpretation:   AP and lateral  of the left shoulder demonstrates a well-positioned total shoulder arthroplasty.  No evidence of loosening or subluxation.    AP and lateral of the right knee demonstrates a Uni arthroplasty with subluxation of the femoral tibial components.    AP lateral and sunrise views of the left knee demonstrate a left total knee to be in ideal position no evidence of loosening or subluxation    Assessment/Plan:      Stable following left total shoulder.  Continue physical therapy for strengthening.    Loose failed Uni arthroplasty right knee.  Consider revision right total knee.  All this is discussed in great detail.  He will follow-up in 3 months      This note was created using Dragon voice recognition software that occasionally misinterpreted phrases or words.

## 2020-12-07 ENCOUNTER — TELEPHONE (OUTPATIENT)
Dept: ORTHOPEDICS | Facility: CLINIC | Age: 70
End: 2020-12-07

## 2020-12-07 NOTE — TELEPHONE ENCOUNTER
----- Message from Silva Wilson sent at 12/7/2020  8:03 AM CST -----  He is ready to schedule RT knee surgery.  He was here last Thursday.  Please call him at [phone]

## 2020-12-17 ENCOUNTER — PATIENT MESSAGE (OUTPATIENT)
Dept: ORTHOPEDICS | Facility: CLINIC | Age: 70
End: 2020-12-17

## 2020-12-17 ENCOUNTER — OFFICE VISIT (OUTPATIENT)
Dept: ORTHOPEDICS | Facility: CLINIC | Age: 70
End: 2020-12-17
Payer: MEDICARE

## 2020-12-17 VITALS
BODY MASS INDEX: 36.51 KG/M2 | HEART RATE: 72 BPM | SYSTOLIC BLOOD PRESSURE: 121 MMHG | HEIGHT: 70 IN | DIASTOLIC BLOOD PRESSURE: 73 MMHG | WEIGHT: 255 LBS

## 2020-12-17 DIAGNOSIS — T84.039A LOOSE ORTHOPEDIC IMPLANT, INITIAL ENCOUNTER: Primary | ICD-10-CM

## 2020-12-17 DIAGNOSIS — Z96.651 HISTORY OF PROSTHETIC UNICOMPARTMENTAL ARTHROPLASTY OF RIGHT KNEE: ICD-10-CM

## 2020-12-17 PROCEDURE — 3288F PR FALLS RISK ASSESSMENT DOCUMENTED: ICD-10-PCS | Mod: S$GLB,,, | Performed by: ORTHOPAEDIC SURGERY

## 2020-12-17 PROCEDURE — 3008F BODY MASS INDEX DOCD: CPT | Mod: S$GLB,,, | Performed by: ORTHOPAEDIC SURGERY

## 2020-12-17 PROCEDURE — 3078F PR MOST RECENT DIASTOLIC BLOOD PRESSURE < 80 MM HG: ICD-10-PCS | Mod: S$GLB,,, | Performed by: ORTHOPAEDIC SURGERY

## 2020-12-17 PROCEDURE — 1101F PR PT FALLS ASSESS DOC 0-1 FALLS W/OUT INJ PAST YR: ICD-10-PCS | Mod: S$GLB,,, | Performed by: ORTHOPAEDIC SURGERY

## 2020-12-17 PROCEDURE — 99213 OFFICE O/P EST LOW 20 MIN: CPT | Mod: S$GLB,,, | Performed by: ORTHOPAEDIC SURGERY

## 2020-12-17 PROCEDURE — 1157F ADVNC CARE PLAN IN RCRD: CPT | Mod: S$GLB,,, | Performed by: ORTHOPAEDIC SURGERY

## 2020-12-17 PROCEDURE — 1159F PR MEDICATION LIST DOCUMENTED IN MEDICAL RECORD: ICD-10-PCS | Mod: S$GLB,,, | Performed by: ORTHOPAEDIC SURGERY

## 2020-12-17 PROCEDURE — 1159F MED LIST DOCD IN RCRD: CPT | Mod: S$GLB,,, | Performed by: ORTHOPAEDIC SURGERY

## 2020-12-17 PROCEDURE — 3288F FALL RISK ASSESSMENT DOCD: CPT | Mod: S$GLB,,, | Performed by: ORTHOPAEDIC SURGERY

## 2020-12-17 PROCEDURE — 3078F DIAST BP <80 MM HG: CPT | Mod: S$GLB,,, | Performed by: ORTHOPAEDIC SURGERY

## 2020-12-17 PROCEDURE — 3074F SYST BP LT 130 MM HG: CPT | Mod: S$GLB,,, | Performed by: ORTHOPAEDIC SURGERY

## 2020-12-17 PROCEDURE — 1125F AMNT PAIN NOTED PAIN PRSNT: CPT | Mod: S$GLB,,, | Performed by: ORTHOPAEDIC SURGERY

## 2020-12-17 PROCEDURE — 3008F PR BODY MASS INDEX (BMI) DOCUMENTED: ICD-10-PCS | Mod: S$GLB,,, | Performed by: ORTHOPAEDIC SURGERY

## 2020-12-17 PROCEDURE — 99213 PR OFFICE/OUTPT VISIT, EST, LEVL III, 20-29 MIN: ICD-10-PCS | Mod: S$GLB,,, | Performed by: ORTHOPAEDIC SURGERY

## 2020-12-17 PROCEDURE — 1125F PR PAIN SEVERITY QUANTIFIED, PAIN PRESENT: ICD-10-PCS | Mod: S$GLB,,, | Performed by: ORTHOPAEDIC SURGERY

## 2020-12-17 PROCEDURE — 3074F PR MOST RECENT SYSTOLIC BLOOD PRESSURE < 130 MM HG: ICD-10-PCS | Mod: S$GLB,,, | Performed by: ORTHOPAEDIC SURGERY

## 2020-12-17 PROCEDURE — 1157F PR ADVANCE CARE PLAN OR EQUIV PRESENT IN MEDICAL RECORD: ICD-10-PCS | Mod: S$GLB,,, | Performed by: ORTHOPAEDIC SURGERY

## 2020-12-17 PROCEDURE — 1101F PT FALLS ASSESS-DOCD LE1/YR: CPT | Mod: S$GLB,,, | Performed by: ORTHOPAEDIC SURGERY

## 2020-12-17 NOTE — PROGRESS NOTES
[facility]    Subjective:     Chief Complaint:   Chief Complaint   Patient presents with    Right Knee - Pain     Right knee pain is pretty bad, having swelling, popping occasional, giving way, here to discuss surgery       Past Medical History:   Diagnosis Date    Arthritis     left knee; right shoulder    Bladder tumor     BPH (benign prostatic hyperplasia)     Hypertension     Sleep apnea     Doews not use C-pap       Past Surgical History:   Procedure Laterality Date    ARTHROPLASTY OF SHOULDER Left 9/24/2019    Procedure: ARTHROPLASTY, SHOULDER;  Surgeon: Abad Miller Jr., MD;  Location: [facility];  Service: Orthopedics;  Laterality: Left;  [employer] Lopes notified 9/20-tcb    CERVICAL FUSION      CYSTOSCOPY      GASTRIC BYPASS  2012    HERNIA REPAIR      ventral hernia    JOINT REPLACEMENT      Bilateral knees & Rt shoulder    PROSTATE SURGERY      greenlight laser    TONSILLECTOMY      TOTAL KNEE ARTHROPLASTY  left    TOTAL SHOULDER ARTHROPLASTY  right    TYMPANOPLASTY  left    VASECTOMY         Current Outpatient Medications   Medication Sig    amlodipine-valsartan (EXFORGE)  mg per tablet Take 1 tablet by mouth once daily.     celecoxib (CELEBREX) 100 MG capsule Take 100 mg by mouth once daily.     citalopram (CELEXA) 20 MG tablet Take 20 mg by mouth once daily.     fish oil-dha-epa (FISH OIL) 1,200-144-216 mg Cap Take 1 capsule by mouth 2 (two) times daily.     labetalol (NORMODYNE) 200 MG tablet Take 200 mg by mouth 2 (two) times daily. Twice a day    methylPREDNISolone (MEDROL DOSEPACK) 4 mg tablet use as directed    pantoprazole (PROTONIX) 40 MG tablet TK 1 T PO QD    potassium chloride SA (K-DUR,KLOR-CON) 20 MEQ tablet Take 20 mEq by mouth 2 (two) times daily.     pravastatin (PRAVACHOL) 40 MG tablet Take 40 mg by mouth once daily. Every morning    spironolactone (ALDACTONE) 100 MG tablet Take 100 mg by mouth once daily.     tamsulosin (FLOMAX) 0.4 mg  Cp24 Take 2 capsules (0.8 mg total) by mouth every evening. Every day    aspirin 325 MG tablet Take 1 tablet (325 mg total) by mouth once daily.     No current facility-administered medications for this visit.        Review of patient's allergies indicates:   Allergen Reactions    Iodinated contrast media Itching    Iodine        Family History   Problem Relation Age of Onset    Benign prostatic hyperplasia Brother     Heart disease Mother     Kidney disease Mother     Arthritis Mother     Mental illness Father 84        dementia    Diabetes Father     Hypertension Father     Heart disease Father     Cancer Father 75        colon    Prostate cancer Neg Hx     Urolithiasis Neg Hx     Kidney cancer Neg Hx        Social History     Socioeconomic History    Marital status:      Spouse name: Not on file    Number of children: Not on file    Years of education: Not on file    Highest education level: Not on file   Occupational History    Not on file   Social Needs    Financial resource strain: Not on file    Food insecurity     Worry: Not on file     Inability: Not on file    Transportation needs     Medical: Not on file     Non-medical: Not on file   Tobacco Use    Smoking status: Never Smoker    Smokeless tobacco: Never Used   Substance and Sexual Activity    Alcohol use: No    Drug use: No    Sexual activity: Not on file   Lifestyle    Physical activity     Days per week: Not on file     Minutes per session: Not on file    Stress: Not on file   Relationships    Social connections     Talks on phone: Not on file     Gets together: Not on file     Attends [employer] service: Not on file     Active member of club or organization: Not on file     Attends meetings of clubs or organizations: Not on file     Relationship status: Not on file   Other Topics Concern    Not on file   Social History Narrative    Not on file       History of present illness:  Patient comes in today for the  right knee.  He is miserable.  His knees catching and giving out.  He is having a lot of pain.      Review of Systems:    Constitution: Negative for chills, fever, and sweats.  Negative for unexplained weight loss.    HENT:  Negative for headaches and blurry vision.    Cardiovascular:Negative for chest pain or irregular heart beat. Negative for hypertension.    Respiratory:  Negative for cough and shortness of breath.    Gastrointestinal: Negative for abdominal pain, heartburn, melena, nausea, and vomitting.    Genitourinary:  Negative bladder incontinence and dysuria.    Musculoskeletal:  See HPI for details.     Neurological: Negative for numbness.    Psychiatric/Behavioral: Negative for depression.  The patient is not nervous/anxious.      Endocrine: Negative for polyuria    Hematologic/Lymphatic: Negative for bleeding problem.  Does not bruise/bleed easily.    Skin: Negative for poor would healing and rash    Objective:      Physical Examination:    Vital Signs:    Vitals:    12/17/20 1342   BP: 121/73   Pulse: 72       Body mass index is 36.59 kg/m².    This a well-developed, well nourished patient in no acute distress.  They are alert and oriented and cooperative to examination.        Patient has a lot of crepitus.  He has 1+ effusion.  His knee is stable to varus valgus stresses.  Pertinent New Results:    XRAY Report / Interpretation:       Assessment/Plan:      Failed urinary arthroplasty right knee.  I have offered him a right total knee.  Risks and benefits discussed in great detail.  He understood.  He wishes to proceed.      This note was created using Dragon voice recognition software that occasionally misinterpreted phrases or words.

## 2020-12-21 ENCOUNTER — TELEPHONE (OUTPATIENT)
Dept: ORTHOPEDICS | Facility: CLINIC | Age: 70
End: 2020-12-21

## 2020-12-21 DIAGNOSIS — T84.039A LOOSE ORTHOPEDIC IMPLANT, INITIAL ENCOUNTER: Primary | ICD-10-CM

## 2020-12-21 DIAGNOSIS — Z01.818 PREOP EXAMINATION: ICD-10-CM

## 2020-12-21 DIAGNOSIS — Z96.651 HISTORY OF PROSTHETIC UNICOMPARTMENTAL ARTHROPLASTY OF RIGHT KNEE: ICD-10-CM

## 2020-12-21 RX ORDER — MUPIROCIN 20 MG/G
OINTMENT TOPICAL
Status: CANCELLED | OUTPATIENT
Start: 2020-12-21

## 2020-12-21 NOTE — TELEPHONE ENCOUNTER
----- Message from Stefania Elam sent at 12/21/2020  2:45 PM CST -----  He would like to cancel his surgery-please call him-Phone#-[phone]

## 2020-12-22 ENCOUNTER — TELEPHONE (OUTPATIENT)
Dept: CARDIOLOGY | Facility: CLINIC | Age: 70
End: 2020-12-22

## 2020-12-22 DIAGNOSIS — T85.698A: Primary | ICD-10-CM

## 2020-12-22 NOTE — TELEPHONE ENCOUNTER
----- Message from Екатерина Snider sent at 12/22/2020  4:13 PM CST -----  Regarding: clearance  patient has surgery schedule for next week and was told his appt needs to be reschedule for his clearance patient said he has not received a new appt yet and been waiting on a phone call call patient back to reschedule his appt [phone]

## 2020-12-22 NOTE — TELEPHONE ENCOUNTER
Spoke to pt not sure who they spoke to just got back from another clinic pt is coming in on 12/24/20 needs surgery clearance

## 2020-12-24 ENCOUNTER — OFFICE VISIT (OUTPATIENT)
Dept: CARDIOLOGY | Facility: CLINIC | Age: 70
End: 2020-12-24
Payer: MEDICARE

## 2020-12-24 ENCOUNTER — LAB VISIT (OUTPATIENT)
Dept: LAB | Facility: HOSPITAL | Age: 70
End: 2020-12-24
Attending: INTERNAL MEDICINE
Payer: MEDICARE

## 2020-12-24 DIAGNOSIS — R06.09 DYSPNEA ON EXERTION: ICD-10-CM

## 2020-12-24 DIAGNOSIS — E26.9 HYPERALDOSTERONISM: ICD-10-CM

## 2020-12-24 DIAGNOSIS — Z01.810 PRE-OPERATIVE CARDIOVASCULAR EXAMINATION: ICD-10-CM

## 2020-12-24 DIAGNOSIS — I15.2 HYPERTENSION DUE TO ENDOCRINE DISORDER: Primary | ICD-10-CM

## 2020-12-24 DIAGNOSIS — E78.2 MIXED HYPERLIPIDEMIA: ICD-10-CM

## 2020-12-24 LAB
ANION GAP SERPL CALC-SCNC: 10 MMOL/L (ref 8–16)
BNP SERPL-MCNC: 51 PG/ML (ref 0–99)
BUN SERPL-MCNC: 14 MG/DL (ref 8–23)
CALCIUM SERPL-MCNC: 9.5 MG/DL (ref 8.7–10.5)
CHLORIDE SERPL-SCNC: 103 MMOL/L (ref 95–110)
CO2 SERPL-SCNC: 28 MMOL/L (ref 23–29)
CREAT SERPL-MCNC: 0.9 MG/DL (ref 0.5–1.4)
EST. GFR  (AFRICAN AMERICAN): >60 ML/MIN/1.73 M^2
EST. GFR  (NON AFRICAN AMERICAN): >60 ML/MIN/1.73 M^2
GLUCOSE SERPL-MCNC: 101 MG/DL (ref 70–110)
POTASSIUM SERPL-SCNC: 3.9 MMOL/L (ref 3.5–5.1)
SODIUM SERPL-SCNC: 141 MMOL/L (ref 136–145)

## 2020-12-24 PROCEDURE — 83880 ASSAY OF NATRIURETIC PEPTIDE: CPT

## 2020-12-24 PROCEDURE — 99213 OFFICE O/P EST LOW 20 MIN: CPT | Mod: S$GLB,,, | Performed by: INTERNAL MEDICINE

## 2020-12-24 PROCEDURE — 36415 COLL VENOUS BLD VENIPUNCTURE: CPT

## 2020-12-24 PROCEDURE — 93000 EKG 12-LEAD: ICD-10-PCS | Mod: S$GLB,,, | Performed by: INTERNAL MEDICINE

## 2020-12-24 PROCEDURE — 80048 BASIC METABOLIC PNL TOTAL CA: CPT

## 2020-12-24 PROCEDURE — 99213 PR OFFICE/OUTPT VISIT, EST, LEVL III, 20-29 MIN: ICD-10-PCS | Mod: S$GLB,,, | Performed by: INTERNAL MEDICINE

## 2020-12-24 PROCEDURE — 93000 ELECTROCARDIOGRAM COMPLETE: CPT | Mod: S$GLB,,, | Performed by: INTERNAL MEDICINE

## 2020-12-24 NOTE — PROGRESS NOTES
Subjective:    Patient ID:  Sammy Muniz is a 70 y.o. male who presents for   Follow-up (no labs, no test, meds tud and reviewed,)    HPI 70-year-old man here requesting clearance for surgery on his right knee with loose hardware.  He does not have any angina orthopnea or PND.  He has mild shortness of breath on moderate to severe exertion.  Review of patient's allergies indicates:   Allergen Reactions    Iodinated contrast media Itching    Iodine        Past Medical History:   Diagnosis Date    Arthritis     left knee; right shoulder    Bladder tumor     BPH (benign prostatic hyperplasia)     Hypertension     Sleep apnea     Doews not use C-pap     Past Surgical History:   Procedure Laterality Date    ARTHROPLASTY OF SHOULDER Left 9/24/2019    Procedure: ARTHROPLASTY, SHOULDER;  Surgeon: Abad Miller Jr., MD;  Location: [facility];  Service: Orthopedics;  Laterality: Left;  [employer] notified 9/20-tcb    CERVICAL FUSION      CYSTOSCOPY      GASTRIC BYPASS  2012    HERNIA REPAIR      ventral hernia    JOINT REPLACEMENT      Bilateral knees & Rt shoulder    PROSTATE SURGERY      greenlight laser    TONSILLECTOMY      TOTAL KNEE ARTHROPLASTY  left    TOTAL SHOULDER ARTHROPLASTY  right    TYMPANOPLASTY  left    VASECTOMY       Social History     Tobacco Use    Smoking status: Never Smoker    Smokeless tobacco: Never Used   Substance Use Topics    Alcohol use: No    Drug use: No        Review of Systems     Review of Systems   Constitution: Negative for weight gain and weight loss.   HENT: Negative for congestion, nosebleeds and sore throat.    Eyes: Negative for visual disturbance.   Cardiovascular: Negative for chest pain, dyspnea on exertion, palpitations and syncope.   Respiratory: Negative for cough, shortness of breath and wheezing.    Skin: Negative for rash.   Musculoskeletal: Negative for back pain, gout, joint pain and myalgias.   Gastrointestinal: Negative for heartburn,  hematochezia and nausea.   Genitourinary: Negative for frequency, hematuria and nocturia.   Neurological: Negative for dizziness and tremors.   Psychiatric/Behavioral: Negative for memory loss.   Allergic/Immunologic: Negative for environmental allergies (Seasonal Allergies).           Objective:        There were no vitals filed for this visit.    Lab Results   Component Value Date    WBC 10.21 09/25/2019    HGB 11.4 (L) 09/25/2019    HCT 34.6 (L) 09/25/2019     09/25/2019    CHOL 147 06/06/2012    TRIG 99 06/06/2012    HDL 36 (L) 06/06/2012    ALT 29 09/10/2019    AST 19 09/10/2019     09/25/2019    K 4.6 09/25/2019     09/25/2019    CREATININE 1.2 09/25/2019    BUN 26 (H) 09/25/2019    CO2 26 09/25/2019    TSH 1.86 08/17/2011    PSA 0.32 01/08/2013    INR 1.05 01/31/2012    HGBA1C 5.6 08/17/2011        ECHOCARDIOGRAM RESULTS  No results found for this or any previous visit.    CURRENT/PREVIOUS VISIT EKG    No results found for this or any previous visit.  No results found for this or any previous visit.    PHYSICAL EXAM    Physical Exam   Constitutional: He is oriented to person, place, and time. He appears well-developed and well-nourished.   Neck: No JVD present. Carotid bruit is not present. No thyromegaly present.   Cardiovascular: Normal rate, regular rhythm and normal heart sounds. Exam reveals no gallop.   No murmur heard.  Pulmonary/Chest: Effort normal and breath sounds normal.   Abdominal: Soft.   Neurological: He is alert and oriented to person, place, and time.        Medication List with Changes/Refills   Current Medications    AMLODIPINE-VALSARTAN (EXFORGE)  MG PER TABLET    Take 1 tablet by mouth once daily.     ASPIRIN 325 MG TABLET    Take 1 tablet (325 mg total) by mouth once daily.    CELECOXIB (CELEBREX) 100 MG CAPSULE    Take 100 mg by mouth once daily.     CITALOPRAM (CELEXA) 20 MG TABLET    Take 20 mg by mouth once daily.     FISH OIL-DHA-EPA (FISH OIL)  1,200-144-216 MG CAP    Take 1 capsule by mouth 2 (two) times daily.     LABETALOL (NORMODYNE) 200 MG TABLET    Take 200 mg by mouth 2 (two) times daily. Twice a day    METHYLPREDNISOLONE (MEDROL DOSEPACK) 4 MG TABLET    use as directed    PANTOPRAZOLE (PROTONIX) 40 MG TABLET    TK 1 T PO QD    POTASSIUM CHLORIDE SA (K-DUR,KLOR-CON) 20 MEQ TABLET    Take 20 mEq by mouth 2 (two) times daily.     PRAVASTATIN (PRAVACHOL) 40 MG TABLET    Take 40 mg by mouth once daily. Every morning    SPIRONOLACTONE (ALDACTONE) 100 MG TABLET    Take 100 mg by mouth once daily.     TAMSULOSIN (FLOMAX) 0.4 MG CP24    Take 2 capsules (0.8 mg total) by mouth every evening. Every day     ECG shows sinus rhythm rate 67 there are QS complexes in V1 and V2 due to high placement of the leads there is no ST-T changes of ischemia or infarction      Assessment:       1. Hypertension due to endocrine disorder    2. Hyperaldosteronism    3. Mixed hyperlipidemia    4. Pre-operative cardiovascular examination         Plan:  An angiogram in 2009 showed a 30% lesion in the LAD and no other significant disease.  A nuclear stress test from 2017 he is within normal limits.  Echocardiogram from 20 from 2017 shows some mild concentric left ventricular hypertrophy ejection fraction of 63% there is no major valvular disease.  On clinical exam there is no evidence of congestive heart failure.  He does not have any exertional angina his functional capacity is somewhat limited by the knee pain  He has been cleared for right knee surgery with the low probability of cardiac complications.  A recent BNP was 51 and BMP was within normal limits.       Problem List Items Addressed This Visit        Cardiac/Vascular    Hyperlipidemia      Other Visit Diagnoses     Hypertension due to endocrine disorder    -  Primary    Relevant Orders    IN OFFICE EKG 12-LEAD (to Muse)    Hyperaldosteronism        Pre-operative cardiovascular examination               No follow-ups on  file.

## 2020-12-24 NOTE — LETTER
2020    Sammy Muniz  [address]  [place] [address]             [facility] [place]  [address], [address]  [place] [address]  Phone: [phone]  Fax: [phone] Patient: Sammy Muniz  : 1950  Referring Doctor: Dr. Barker  Procedure : Right Replacement  Date of Last Office Visit:  2020    Current Outpatient Medications   Medication Sig    amlodipine-valsartan (EXFORGE)  mg per tablet Take 1 tablet by mouth once daily.     aspirin 325 MG tablet Take 1 tablet (325 mg total) by mouth once daily.    celecoxib (CELEBREX) 100 MG capsule Take 100 mg by mouth once daily.     citalopram (CELEXA) 20 MG tablet Take 20 mg by mouth once daily.     fish oil-dha-epa (FISH OIL) 1,200-144-216 mg Cap Take 1 capsule by mouth 2 (two) times daily.     labetalol (NORMODYNE) 200 MG tablet Take 200 mg by mouth 2 (two) times daily. Twice a day    methylPREDNISolone (MEDROL DOSEPACK) 4 mg tablet use as directed    pantoprazole (PROTONIX) 40 MG tablet TK 1 T PO QD    potassium chloride SA (K-DUR,KLOR-CON) 20 MEQ tablet Take 20 mEq by mouth 2 (two) times daily.     pravastatin (PRAVACHOL) 40 MG tablet Take 40 mg by mouth once daily. Every morning    spironolactone (ALDACTONE) 100 MG tablet Take 100 mg by mouth once daily.     tamsulosin (FLOMAX) 0.4 mg Cp24 Take 2 capsules (0.8 mg total) by mouth every evening. Every day     No current facility-administered medications for this visit.        This patient has been assessed for risk factors for clearance of surgery with the following stipulations:    _X__ No contraindications    _X__ Recommendations for antiplatelet/anticoagulant medications: HOLD ASA ___ DAYS PRIOR    _X__ Cleared for surgery     If you have any questions regarding the above, please contact my office at [phone].    Sincerely,    Dr. TEERSA Pryor

## 2020-12-28 ENCOUNTER — TELEPHONE (OUTPATIENT)
Dept: CARDIOLOGY | Facility: CLINIC | Age: 70
End: 2020-12-28

## 2020-12-28 ENCOUNTER — CLINICAL SUPPORT (OUTPATIENT)
Dept: CARDIOLOGY | Facility: HOSPITAL | Age: 70
End: 2020-12-28
Attending: ORTHOPAEDIC SURGERY
Payer: MEDICARE

## 2020-12-28 DIAGNOSIS — T85.698A: ICD-10-CM

## 2020-12-28 PROCEDURE — 93923 SEGMENTAL PRESSURE LOWER EXTREMITY: ICD-10-PCS | Mod: 26,,, | Performed by: INTERNAL MEDICINE

## 2020-12-28 PROCEDURE — 93923 UPR/LXTR ART STDY 3+ LVLS: CPT | Mod: 50

## 2020-12-28 PROCEDURE — 93923 UPR/LXTR ART STDY 3+ LVLS: CPT | Mod: 26,,, | Performed by: INTERNAL MEDICINE

## 2020-12-28 NOTE — TELEPHONE ENCOUNTER
----- Message from Екатерина Snider sent at 12/28/2020 10:18 AM CST -----  Regarding: last tests and visit notes EKG STRESS TEST ETC.   [facility] patient last test results and last visit notes fax over to them for pre-op [phone] if any questions [phone] [facility]

## 2020-12-28 NOTE — TELEPHONE ENCOUNTER
Spoke to aj from [facility] fax ekg and last office visit to [phone] when isabel

## 2020-12-30 ENCOUNTER — TELEPHONE (OUTPATIENT)
Dept: CARDIOLOGY | Facility: CLINIC | Age: 70
End: 2020-12-30

## 2020-12-30 NOTE — TELEPHONE ENCOUNTER
----- Message from Екатерина Snider sent at 12/30/2020  9:47 AM CST -----  Regarding: surgical clearance   office is needing a copy of the surgical clearance faxed over to them [phone]

## 2020-12-31 LAB
LEFT ABI: 1.38
LEFT ARM BP: 118 MMHG
LEFT CALF BP: 194 MMHG
LEFT POSTERIOR TIBIAL: 163 MMHG
LEFT UPPER LEG BP: 184 MMHG
RIGHT ABI: 1.43
RIGHT ARM BP: 118 MMHG
RIGHT CALF BP: 199 MMHG
RIGHT POSTERIOR TIBIAL: 169 MMHG
RIGHT UPPER LEG BP: 172 MMHG

## 2021-02-10 ENCOUNTER — TELEPHONE (OUTPATIENT)
Dept: CARDIOLOGY | Facility: CLINIC | Age: 71
End: 2021-02-10

## 2021-02-23 ENCOUNTER — TELEPHONE (OUTPATIENT)
Dept: FAMILY MEDICINE | Facility: CLINIC | Age: 71
End: 2021-02-23

## 2021-02-24 ENCOUNTER — IMMUNIZATION (OUTPATIENT)
Dept: FAMILY MEDICINE | Facility: CLINIC | Age: 71
End: 2021-02-24
Payer: MEDICARE

## 2021-02-24 DIAGNOSIS — Z23 NEED FOR VACCINATION: Primary | ICD-10-CM

## 2021-02-24 PROCEDURE — 91300 COVID-19, MRNA, LNP-S, PF, 30 MCG/0.3 ML DOSE VACCINE: CPT | Mod: PBBFAC | Performed by: INTERNAL MEDICINE

## 2021-03-11 ENCOUNTER — OFFICE VISIT (OUTPATIENT)
Dept: FAMILY MEDICINE | Facility: CLINIC | Age: 71
End: 2021-03-11
Payer: MEDICARE

## 2021-03-11 VITALS
WEIGHT: 259.94 LBS | TEMPERATURE: 98 F | HEART RATE: 65 BPM | OXYGEN SATURATION: 95 % | DIASTOLIC BLOOD PRESSURE: 76 MMHG | HEIGHT: 70 IN | SYSTOLIC BLOOD PRESSURE: 130 MMHG | BODY MASS INDEX: 37.21 KG/M2

## 2021-03-11 DIAGNOSIS — N40.1 BENIGN PROSTATIC HYPERPLASIA WITH NOCTURIA: ICD-10-CM

## 2021-03-11 DIAGNOSIS — F41.9 ANXIETY AND DEPRESSION: ICD-10-CM

## 2021-03-11 DIAGNOSIS — Z11.59 ENCOUNTER FOR HEPATITIS C SCREENING TEST FOR LOW RISK PATIENT: ICD-10-CM

## 2021-03-11 DIAGNOSIS — F32.A ANXIETY AND DEPRESSION: ICD-10-CM

## 2021-03-11 DIAGNOSIS — E66.01 CLASS 2 SEVERE OBESITY DUE TO EXCESS CALORIES WITH SERIOUS COMORBIDITY AND BODY MASS INDEX (BMI) OF 37.0 TO 37.9 IN ADULT: ICD-10-CM

## 2021-03-11 DIAGNOSIS — I10 ESSENTIAL HYPERTENSION: ICD-10-CM

## 2021-03-11 DIAGNOSIS — R35.1 BENIGN PROSTATIC HYPERPLASIA WITH NOCTURIA: ICD-10-CM

## 2021-03-11 DIAGNOSIS — E78.3 MIXED HYPERGLYCERIDEMIA: Primary | ICD-10-CM

## 2021-03-11 DIAGNOSIS — Z12.5 SCREENING FOR PROSTATE CANCER: ICD-10-CM

## 2021-03-11 PROCEDURE — 3078F DIAST BP <80 MM HG: CPT | Mod: CPTII,S$GLB,, | Performed by: FAMILY MEDICINE

## 2021-03-11 PROCEDURE — 1101F PT FALLS ASSESS-DOCD LE1/YR: CPT | Mod: CPTII,S$GLB,, | Performed by: FAMILY MEDICINE

## 2021-03-11 PROCEDURE — 3008F BODY MASS INDEX DOCD: CPT | Mod: CPTII,S$GLB,, | Performed by: FAMILY MEDICINE

## 2021-03-11 PROCEDURE — 99999 PR PBB SHADOW E&M-EST. PATIENT-LVL IV: ICD-10-PCS | Mod: PBBFAC,,, | Performed by: FAMILY MEDICINE

## 2021-03-11 PROCEDURE — 1126F PR PAIN SEVERITY QUANTIFIED, NO PAIN PRESENT: ICD-10-PCS | Mod: S$GLB,,, | Performed by: FAMILY MEDICINE

## 2021-03-11 PROCEDURE — 3075F PR MOST RECENT SYSTOLIC BLOOD PRESS GE 130-139MM HG: ICD-10-PCS | Mod: CPTII,S$GLB,, | Performed by: FAMILY MEDICINE

## 2021-03-11 PROCEDURE — 1159F PR MEDICATION LIST DOCUMENTED IN MEDICAL RECORD: ICD-10-PCS | Mod: S$GLB,,, | Performed by: FAMILY MEDICINE

## 2021-03-11 PROCEDURE — 1126F AMNT PAIN NOTED NONE PRSNT: CPT | Mod: S$GLB,,, | Performed by: FAMILY MEDICINE

## 2021-03-11 PROCEDURE — 3288F FALL RISK ASSESSMENT DOCD: CPT | Mod: CPTII,S$GLB,, | Performed by: FAMILY MEDICINE

## 2021-03-11 PROCEDURE — 3288F PR FALLS RISK ASSESSMENT DOCUMENTED: ICD-10-PCS | Mod: CPTII,S$GLB,, | Performed by: FAMILY MEDICINE

## 2021-03-11 PROCEDURE — 99205 OFFICE O/P NEW HI 60 MIN: CPT | Mod: S$GLB,,, | Performed by: FAMILY MEDICINE

## 2021-03-11 PROCEDURE — 3008F PR BODY MASS INDEX (BMI) DOCUMENTED: ICD-10-PCS | Mod: CPTII,S$GLB,, | Performed by: FAMILY MEDICINE

## 2021-03-11 PROCEDURE — 1101F PR PT FALLS ASSESS DOC 0-1 FALLS W/OUT INJ PAST YR: ICD-10-PCS | Mod: CPTII,S$GLB,, | Performed by: FAMILY MEDICINE

## 2021-03-11 PROCEDURE — 99205 PR OFFICE/OUTPT VISIT, NEW, LEVL V, 60-74 MIN: ICD-10-PCS | Mod: S$GLB,,, | Performed by: FAMILY MEDICINE

## 2021-03-11 PROCEDURE — 1157F PR ADVANCE CARE PLAN OR EQUIV PRESENT IN MEDICAL RECORD: ICD-10-PCS | Mod: S$GLB,,, | Performed by: FAMILY MEDICINE

## 2021-03-11 PROCEDURE — 1157F ADVNC CARE PLAN IN RCRD: CPT | Mod: S$GLB,,, | Performed by: FAMILY MEDICINE

## 2021-03-11 PROCEDURE — 3078F PR MOST RECENT DIASTOLIC BLOOD PRESSURE < 80 MM HG: ICD-10-PCS | Mod: CPTII,S$GLB,, | Performed by: FAMILY MEDICINE

## 2021-03-11 PROCEDURE — 1159F MED LIST DOCD IN RCRD: CPT | Mod: S$GLB,,, | Performed by: FAMILY MEDICINE

## 2021-03-11 PROCEDURE — 99999 PR PBB SHADOW E&M-EST. PATIENT-LVL IV: CPT | Mod: PBBFAC,,, | Performed by: FAMILY MEDICINE

## 2021-03-11 PROCEDURE — 3075F SYST BP GE 130 - 139MM HG: CPT | Mod: CPTII,S$GLB,, | Performed by: FAMILY MEDICINE

## 2021-03-11 RX ORDER — TAMSULOSIN HYDROCHLORIDE 0.4 MG/1
0.4 CAPSULE ORAL NIGHTLY
COMMUNITY
End: 2021-03-17 | Stop reason: SDUPTHER

## 2021-03-11 RX ORDER — SPIRONOLACTONE 50 MG/1
50 TABLET, FILM COATED ORAL DAILY
COMMUNITY
End: 2021-03-23 | Stop reason: SDUPTHER

## 2021-03-12 ENCOUNTER — PATIENT OUTREACH (OUTPATIENT)
Dept: ADMINISTRATIVE | Facility: HOSPITAL | Age: 71
End: 2021-03-12

## 2021-03-17 ENCOUNTER — IMMUNIZATION (OUTPATIENT)
Dept: FAMILY MEDICINE | Facility: CLINIC | Age: 71
End: 2021-03-17
Payer: MEDICARE

## 2021-03-17 DIAGNOSIS — N13.8 BENIGN PROSTATIC HYPERPLASIA WITH URINARY OBSTRUCTION: Primary | ICD-10-CM

## 2021-03-17 DIAGNOSIS — N40.1 BENIGN PROSTATIC HYPERPLASIA WITH URINARY OBSTRUCTION: Primary | ICD-10-CM

## 2021-03-17 DIAGNOSIS — Z23 NEED FOR VACCINATION: Primary | ICD-10-CM

## 2021-03-17 PROCEDURE — 0002A COVID-19, MRNA, LNP-S, PF, 30 MCG/0.3 ML DOSE VACCINE: CPT | Mod: PBBFAC | Performed by: INTERNAL MEDICINE

## 2021-03-17 PROCEDURE — 91300 COVID-19, MRNA, LNP-S, PF, 30 MCG/0.3 ML DOSE VACCINE: CPT | Mod: PBBFAC | Performed by: INTERNAL MEDICINE

## 2021-03-17 RX ORDER — TAMSULOSIN HYDROCHLORIDE 0.4 MG/1
0.4 CAPSULE ORAL NIGHTLY
Qty: 90 CAPSULE | Refills: 3 | Status: SHIPPED | OUTPATIENT
Start: 2021-03-17 | End: 2022-03-02

## 2021-03-23 DIAGNOSIS — F41.9 ANXIETY: ICD-10-CM

## 2021-03-23 DIAGNOSIS — M19.012 PRIMARY OSTEOARTHRITIS OF LEFT SHOULDER: ICD-10-CM

## 2021-03-23 DIAGNOSIS — F32.A DEPRESSION, UNSPECIFIED DEPRESSION TYPE: ICD-10-CM

## 2021-03-23 DIAGNOSIS — I10 ESSENTIAL HYPERTENSION: Primary | ICD-10-CM

## 2021-03-23 DIAGNOSIS — K21.9 GASTROESOPHAGEAL REFLUX DISEASE, UNSPECIFIED WHETHER ESOPHAGITIS PRESENT: ICD-10-CM

## 2021-03-23 DIAGNOSIS — E78.2 MIXED HYPERLIPIDEMIA: ICD-10-CM

## 2021-03-23 RX ORDER — AMLODIPINE AND VALSARTAN 10; 320 MG/1; MG/1
1 TABLET ORAL DAILY
Qty: 90 TABLET | Refills: 3 | Status: SHIPPED | OUTPATIENT
Start: 2021-03-23 | End: 2021-05-10

## 2021-03-23 RX ORDER — SPIRONOLACTONE 50 MG/1
50 TABLET, FILM COATED ORAL DAILY
Qty: 90 TABLET | Refills: 3 | Status: SHIPPED | OUTPATIENT
Start: 2021-03-23 | End: 2022-01-12

## 2021-03-23 RX ORDER — POTASSIUM CHLORIDE 20 MEQ/1
20 TABLET, EXTENDED RELEASE ORAL 2 TIMES DAILY
Qty: 180 TABLET | Refills: 3 | Status: SHIPPED | OUTPATIENT
Start: 2021-03-23 | End: 2022-04-05

## 2021-03-23 RX ORDER — CELECOXIB 100 MG/1
100 CAPSULE ORAL DAILY
Qty: 90 CAPSULE | Refills: 3 | Status: SHIPPED | OUTPATIENT
Start: 2021-03-23 | End: 2022-01-12

## 2021-03-23 RX ORDER — PRAVASTATIN SODIUM 40 MG/1
40 TABLET ORAL DAILY
Qty: 90 TABLET | Refills: 3 | Status: SHIPPED | OUTPATIENT
Start: 2021-03-23 | End: 2022-01-12

## 2021-03-23 RX ORDER — PANTOPRAZOLE SODIUM 40 MG/1
TABLET, DELAYED RELEASE ORAL
Qty: 90 TABLET | Refills: 3 | Status: SHIPPED | OUTPATIENT
Start: 2021-03-23 | End: 2022-02-21

## 2021-03-23 RX ORDER — LABETALOL 200 MG/1
200 TABLET, FILM COATED ORAL 2 TIMES DAILY
Qty: 180 TABLET | Refills: 3 | Status: SHIPPED | OUTPATIENT
Start: 2021-03-23 | End: 2022-01-12

## 2021-03-23 RX ORDER — CITALOPRAM 20 MG/1
20 TABLET, FILM COATED ORAL DAILY
Qty: 90 TABLET | Refills: 3 | Status: SHIPPED | OUTPATIENT
Start: 2021-03-23 | End: 2022-01-12

## 2021-03-25 ENCOUNTER — TELEPHONE (OUTPATIENT)
Dept: CARDIOLOGY | Facility: CLINIC | Age: 71
End: 2021-03-25

## 2021-04-08 ENCOUNTER — TELEPHONE (OUTPATIENT)
Dept: FAMILY MEDICINE | Facility: CLINIC | Age: 71
End: 2021-04-08

## 2021-05-10 ENCOUNTER — OFFICE VISIT (OUTPATIENT)
Dept: CARDIOLOGY | Facility: CLINIC | Age: 71
End: 2021-05-10
Payer: MEDICARE

## 2021-05-10 VITALS
DIASTOLIC BLOOD PRESSURE: 80 MMHG | WEIGHT: 262 LBS | RESPIRATION RATE: 16 BRPM | OXYGEN SATURATION: 95 % | HEART RATE: 60 BPM | HEIGHT: 70 IN | BODY MASS INDEX: 37.51 KG/M2 | SYSTOLIC BLOOD PRESSURE: 120 MMHG

## 2021-05-10 DIAGNOSIS — E78.00 PURE HYPERCHOLESTEROLEMIA: ICD-10-CM

## 2021-05-10 DIAGNOSIS — G47.30 SLEEP APNEA, UNSPECIFIED TYPE: ICD-10-CM

## 2021-05-10 DIAGNOSIS — I10 ESSENTIAL HYPERTENSION: ICD-10-CM

## 2021-05-10 DIAGNOSIS — M17.0 PRIMARY OSTEOARTHRITIS OF BOTH KNEES: ICD-10-CM

## 2021-05-10 DIAGNOSIS — R06.02 SHORTNESS OF BREATH: ICD-10-CM

## 2021-05-10 PROCEDURE — 93000 EKG 12-LEAD: ICD-10-PCS | Mod: S$GLB,,, | Performed by: INTERNAL MEDICINE

## 2021-05-10 PROCEDURE — 1101F PT FALLS ASSESS-DOCD LE1/YR: CPT | Mod: CPTII,S$GLB,, | Performed by: INTERNAL MEDICINE

## 2021-05-10 PROCEDURE — 3288F FALL RISK ASSESSMENT DOCD: CPT | Mod: CPTII,S$GLB,, | Performed by: INTERNAL MEDICINE

## 2021-05-10 PROCEDURE — 3288F PR FALLS RISK ASSESSMENT DOCUMENTED: ICD-10-PCS | Mod: CPTII,S$GLB,, | Performed by: INTERNAL MEDICINE

## 2021-05-10 PROCEDURE — 1159F MED LIST DOCD IN RCRD: CPT | Mod: S$GLB,,, | Performed by: INTERNAL MEDICINE

## 2021-05-10 PROCEDURE — 1157F PR ADVANCE CARE PLAN OR EQUIV PRESENT IN MEDICAL RECORD: ICD-10-PCS | Mod: S$GLB,,, | Performed by: INTERNAL MEDICINE

## 2021-05-10 PROCEDURE — 1101F PR PT FALLS ASSESS DOC 0-1 FALLS W/OUT INJ PAST YR: ICD-10-PCS | Mod: CPTII,S$GLB,, | Performed by: INTERNAL MEDICINE

## 2021-05-10 PROCEDURE — 1126F AMNT PAIN NOTED NONE PRSNT: CPT | Mod: S$GLB,,, | Performed by: INTERNAL MEDICINE

## 2021-05-10 PROCEDURE — 3074F SYST BP LT 130 MM HG: CPT | Mod: CPTII,S$GLB,, | Performed by: INTERNAL MEDICINE

## 2021-05-10 PROCEDURE — 3074F PR MOST RECENT SYSTOLIC BLOOD PRESSURE < 130 MM HG: ICD-10-PCS | Mod: CPTII,S$GLB,, | Performed by: INTERNAL MEDICINE

## 2021-05-10 PROCEDURE — 1159F PR MEDICATION LIST DOCUMENTED IN MEDICAL RECORD: ICD-10-PCS | Mod: S$GLB,,, | Performed by: INTERNAL MEDICINE

## 2021-05-10 PROCEDURE — 99214 OFFICE O/P EST MOD 30 MIN: CPT | Mod: S$GLB,,, | Performed by: INTERNAL MEDICINE

## 2021-05-10 PROCEDURE — 3079F DIAST BP 80-89 MM HG: CPT | Mod: CPTII,S$GLB,, | Performed by: INTERNAL MEDICINE

## 2021-05-10 PROCEDURE — 93000 ELECTROCARDIOGRAM COMPLETE: CPT | Mod: S$GLB,,, | Performed by: INTERNAL MEDICINE

## 2021-05-10 PROCEDURE — 1126F PR PAIN SEVERITY QUANTIFIED, NO PAIN PRESENT: ICD-10-PCS | Mod: S$GLB,,, | Performed by: INTERNAL MEDICINE

## 2021-05-10 PROCEDURE — 1157F ADVNC CARE PLAN IN RCRD: CPT | Mod: S$GLB,,, | Performed by: INTERNAL MEDICINE

## 2021-05-10 PROCEDURE — 3008F PR BODY MASS INDEX (BMI) DOCUMENTED: ICD-10-PCS | Mod: CPTII,S$GLB,, | Performed by: INTERNAL MEDICINE

## 2021-05-10 PROCEDURE — 3008F BODY MASS INDEX DOCD: CPT | Mod: CPTII,S$GLB,, | Performed by: INTERNAL MEDICINE

## 2021-05-10 PROCEDURE — 99214 PR OFFICE/OUTPT VISIT, EST, LEVL IV, 30-39 MIN: ICD-10-PCS | Mod: S$GLB,,, | Performed by: INTERNAL MEDICINE

## 2021-05-10 PROCEDURE — 3079F PR MOST RECENT DIASTOLIC BLOOD PRESSURE 80-89 MM HG: ICD-10-PCS | Mod: CPTII,S$GLB,, | Performed by: INTERNAL MEDICINE

## 2021-05-10 RX ORDER — AMLODIPINE AND VALSARTAN 5; 320 MG/1; MG/1
1 TABLET ORAL DAILY
Qty: 90 TABLET | Refills: 3 | Status: SHIPPED | OUTPATIENT
Start: 2021-05-10 | End: 2021-06-23 | Stop reason: SDUPTHER

## 2021-05-19 ENCOUNTER — TELEPHONE (OUTPATIENT)
Dept: FAMILY MEDICINE | Facility: CLINIC | Age: 71
End: 2021-05-19

## 2021-05-19 DIAGNOSIS — I10 ESSENTIAL HYPERTENSION: Primary | ICD-10-CM

## 2021-05-19 DIAGNOSIS — R93.89 ABNORMAL CXR: ICD-10-CM

## 2021-05-26 ENCOUNTER — HOSPITAL ENCOUNTER (OUTPATIENT)
Dept: CARDIOLOGY | Facility: CLINIC | Age: 71
Discharge: HOME | End: 2021-05-26
Attending: INTERNAL MEDICINE
Payer: MEDICARE

## 2021-05-26 ENCOUNTER — HOSPITAL ENCOUNTER (OUTPATIENT)
Dept: RADIOLOGY | Facility: CLINIC | Age: 71
Discharge: HOME | End: 2021-05-26
Attending: INTERNAL MEDICINE
Payer: MEDICARE

## 2021-05-26 VITALS — BODY MASS INDEX: 37.51 KG/M2 | HEIGHT: 70 IN | WEIGHT: 262 LBS

## 2021-05-26 DIAGNOSIS — I10 ESSENTIAL HYPERTENSION: ICD-10-CM

## 2021-05-26 DIAGNOSIS — R06.02 SHORTNESS OF BREATH: ICD-10-CM

## 2021-05-26 PROCEDURE — 78452 HT MUSCLE IMAGE SPECT MULT: CPT | Mod: S$GLB,,, | Performed by: INTERNAL MEDICINE

## 2021-05-26 PROCEDURE — A9502 STRESS TEST WITH MYOCARDIAL PERFUSION (CUPID ONLY): ICD-10-PCS | Mod: S$GLB,,, | Performed by: INTERNAL MEDICINE

## 2021-05-26 PROCEDURE — 93306 ECHO (CUPID ONLY): ICD-10-PCS | Mod: S$GLB,,, | Performed by: INTERNAL MEDICINE

## 2021-05-26 PROCEDURE — 93015 STRESS TEST WITH MYOCARDIAL PERFUSION (CUPID ONLY): ICD-10-PCS | Mod: S$GLB,,, | Performed by: INTERNAL MEDICINE

## 2021-05-26 PROCEDURE — 93015 CV STRESS TEST SUPVJ I&R: CPT | Mod: S$GLB,,, | Performed by: INTERNAL MEDICINE

## 2021-05-26 PROCEDURE — A9502 TC99M TETROFOSMIN: HCPCS | Mod: S$GLB,,, | Performed by: INTERNAL MEDICINE

## 2021-05-26 PROCEDURE — 78452 STRESS TEST WITH MYOCARDIAL PERFUSION (CUPID ONLY): ICD-10-PCS | Mod: S$GLB,,, | Performed by: INTERNAL MEDICINE

## 2021-05-26 PROCEDURE — 93306 TTE W/DOPPLER COMPLETE: CPT | Mod: S$GLB,,, | Performed by: INTERNAL MEDICINE

## 2021-05-26 RX ORDER — REGADENOSON 0.08 MG/ML
0.4 INJECTION, SOLUTION INTRAVENOUS ONCE
Status: COMPLETED | OUTPATIENT
Start: 2021-05-26 | End: 2021-05-26

## 2021-05-26 RX ADMIN — REGADENOSON 0.4 MG: 0.08 INJECTION, SOLUTION INTRAVENOUS at 08:05

## 2021-05-27 ENCOUNTER — HOSPITAL ENCOUNTER (OUTPATIENT)
Dept: RADIOLOGY | Facility: CLINIC | Age: 71
Discharge: HOME | End: 2021-05-27
Attending: INTERNAL MEDICINE
Payer: MEDICARE

## 2021-05-28 ENCOUNTER — HOSPITAL ENCOUNTER (OUTPATIENT)
Dept: RADIOLOGY | Facility: CLINIC | Age: 71
Discharge: HOME | End: 2021-05-28
Attending: FAMILY MEDICINE
Payer: MEDICARE

## 2021-05-28 DIAGNOSIS — R93.89 ABNORMAL CXR: ICD-10-CM

## 2021-05-28 PROCEDURE — 71046 X-RAY EXAM CHEST 2 VIEWS: CPT | Mod: TC,FY,PO

## 2021-05-28 PROCEDURE — 71046 X-RAY EXAM CHEST 2 VIEWS: CPT | Mod: 26,,, | Performed by: RADIOLOGY

## 2021-05-28 PROCEDURE — 71046 XR CHEST PA AND LATERAL: ICD-10-PCS | Mod: 26,,, | Performed by: RADIOLOGY

## 2021-05-29 LAB
BUN SERPL-MCNC: 21 MG/DL (ref 7–25)
BUN/CREAT SERPL: NORMAL (CALC) (ref 6–22)
CALCIUM SERPL-MCNC: 9.4 MG/DL (ref 8.6–10.3)
CHLORIDE SERPL-SCNC: 104 MMOL/L (ref 98–110)
CO2 SERPL-SCNC: 27 MMOL/L (ref 20–32)
CREAT SERPL-MCNC: 0.91 MG/DL (ref 0.7–1.25)
GLUCOSE SERPL-MCNC: 95 MG/DL (ref 65–99)
POTASSIUM SERPL-SCNC: 4.3 MMOL/L (ref 3.5–5.3)
SODIUM SERPL-SCNC: 142 MMOL/L (ref 135–146)

## 2021-06-02 LAB
AORTIC ROOT ANNULUS: 3.8 CM
AORTIC VALVE CUSP SEPERATION: 1.7 CM
AV INDEX (PROSTH): 0.65
AV MEAN GRADIENT: 6 MMHG
AV PEAK GRADIENT: 12 MMHG
AV REGURGITATION PRESSURE HALF TIME: 479 MS
AV VALVE AREA: 2.27 CM2
AV VELOCITY RATIO: 0.6
BSA FOR ECHO PROCEDURE: 2.42 M2
CV ECHO LV RWT: 0.48 CM
CV PHARM DOSE: 0.4 MG
CV STRESS BASE HR: 61 BPM
DIASTOLIC BLOOD PRESSURE: 86 MMHG
DOP CALC AO PEAK VEL: 1.71 M/S
DOP CALC AO VTI: 39.7 CM
DOP CALC LVOT AREA: 3.5 CM2
DOP CALC LVOT DIAMETER: 2.1 CM
DOP CALC LVOT PEAK VEL: 1.03 M/S
DOP CALC LVOT STROKE VOLUME: 90.01 CM3
DOP CALCLVOT PEAK VEL VTI: 26 CM
E WAVE DECELERATION TIME: 132 MS
E/A RATIO: 0.86
E/E' RATIO: 10.2 M/S
ECHO LV POSTERIOR WALL: 1.2 CM (ref 0.6–1.1)
EJECTION FRACTION- HIGH: 65 %
EJECTION FRACTION: 65 %
END DIASTOLIC INDEX-HIGH: 158 ML/M2
END DIASTOLIC INDEX-LOW: 94 ML/M2
END SYSTOLIC INDEX-HIGH: 71 ML/M2
END SYSTOLIC INDEX-LOW: 33 ML/M2
FRACTIONAL SHORTENING: 35 % (ref 28–44)
INTERVENTRICULAR SEPTUM: 1.38 CM (ref 0.6–1.1)
IVRT: 84 MS
LA MAJOR: 3.6 CM
LEFT ATRIUM SIZE: 3.7 CM
LEFT INTERNAL DIMENSION IN SYSTOLE: 3.26 CM (ref 2.1–4)
LEFT VENTRICLE MASS INDEX: 111 G/M2
LEFT VENTRICULAR INTERNAL DIMENSION IN DIASTOLE: 5 CM (ref 3.5–6)
LEFT VENTRICULAR MASS: 258.95 G
LV LATERAL E/E' RATIO: 7.29 M/S
LV SEPTAL E/E' RATIO: 17 M/S
MV PEAK A VEL: 0.59 M/S
MV PEAK E VEL: 0.51 M/S
NUC STRESS DIASTOLIC VOLUME INDEX: 88
NUC STRESS EJECTION FRACTION: 60 %
NUC STRESS SYSTOLIC VOLUME INDEX: 35
OHS CV CPX 1 MINUTE RECOVERY HEART RATE: 82 BPM
OHS CV CPX 85 PERCENT MAX PREDICTED HEART RATE MALE: 129
OHS CV CPX MAX PREDICTED HEART RATE: 152
OHS CV CPX PATIENT IS FEMALE: 0
OHS CV CPX PATIENT IS MALE: 1
OHS CV CPX PEAK DIASTOLIC BLOOD PRESSURE: 86 MMHG
OHS CV CPX PEAK HEAR RATE: 87 BPM
OHS CV CPX PEAK RATE PRESSURE PRODUCT: NORMAL
OHS CV CPX PEAK SYSTOLIC BLOOD PRESSURE: 144 MMHG
OHS CV CPX PERCENT MAX PREDICTED HEART RATE ACHIEVED: 57
OHS CV CPX RATE PRESSURE PRODUCT PRESENTING: 8906
PISA TR MAX VEL: 2.43 M/S
RA PRESSURE: 3 MMHG
RETIRED EF AND QEF - SEE NOTES: 53 %
RIGHT VENTRICULAR END-DIASTOLIC DIMENSION: 3.02 CM
SYSTOLIC BLOOD PRESSURE: 146 MMHG
TDI LATERAL: 0.07 M/S
TDI SEPTAL: 0.03 M/S
TDI: 0.05 M/S
TR MAX PG: 24 MMHG
TV REST PULMONARY ARTERY PRESSURE: 27 MMHG

## 2021-06-03 ENCOUNTER — OFFICE VISIT (OUTPATIENT)
Dept: CARDIOLOGY | Facility: CLINIC | Age: 71
End: 2021-06-03
Payer: MEDICARE

## 2021-06-03 VITALS
OXYGEN SATURATION: 94 % | HEART RATE: 71 BPM | WEIGHT: 262.81 LBS | DIASTOLIC BLOOD PRESSURE: 76 MMHG | SYSTOLIC BLOOD PRESSURE: 128 MMHG | BODY MASS INDEX: 37.71 KG/M2

## 2021-06-03 DIAGNOSIS — I10 ESSENTIAL HYPERTENSION: Primary | ICD-10-CM

## 2021-06-03 DIAGNOSIS — G47.30 SLEEP APNEA, UNSPECIFIED TYPE: ICD-10-CM

## 2021-06-03 DIAGNOSIS — E78.00 PURE HYPERCHOLESTEROLEMIA: ICD-10-CM

## 2021-06-03 PROCEDURE — 1101F PT FALLS ASSESS-DOCD LE1/YR: CPT | Mod: CPTII,S$GLB,, | Performed by: NURSE PRACTITIONER

## 2021-06-03 PROCEDURE — 1157F ADVNC CARE PLAN IN RCRD: CPT | Mod: S$GLB,,, | Performed by: NURSE PRACTITIONER

## 2021-06-03 PROCEDURE — 3008F BODY MASS INDEX DOCD: CPT | Mod: CPTII,S$GLB,, | Performed by: NURSE PRACTITIONER

## 2021-06-03 PROCEDURE — 3288F FALL RISK ASSESSMENT DOCD: CPT | Mod: CPTII,S$GLB,, | Performed by: NURSE PRACTITIONER

## 2021-06-03 PROCEDURE — 99214 PR OFFICE/OUTPT VISIT, EST, LEVL IV, 30-39 MIN: ICD-10-PCS | Mod: S$GLB,,, | Performed by: NURSE PRACTITIONER

## 2021-06-03 PROCEDURE — 1101F PR PT FALLS ASSESS DOC 0-1 FALLS W/OUT INJ PAST YR: ICD-10-PCS | Mod: CPTII,S$GLB,, | Performed by: NURSE PRACTITIONER

## 2021-06-03 PROCEDURE — 1126F PR PAIN SEVERITY QUANTIFIED, NO PAIN PRESENT: ICD-10-PCS | Mod: S$GLB,,, | Performed by: NURSE PRACTITIONER

## 2021-06-03 PROCEDURE — 1159F MED LIST DOCD IN RCRD: CPT | Mod: S$GLB,,, | Performed by: NURSE PRACTITIONER

## 2021-06-03 PROCEDURE — 3078F PR MOST RECENT DIASTOLIC BLOOD PRESSURE < 80 MM HG: ICD-10-PCS | Mod: CPTII,S$GLB,, | Performed by: NURSE PRACTITIONER

## 2021-06-03 PROCEDURE — 3078F DIAST BP <80 MM HG: CPT | Mod: CPTII,S$GLB,, | Performed by: NURSE PRACTITIONER

## 2021-06-03 PROCEDURE — 1126F AMNT PAIN NOTED NONE PRSNT: CPT | Mod: S$GLB,,, | Performed by: NURSE PRACTITIONER

## 2021-06-03 PROCEDURE — 1157F PR ADVANCE CARE PLAN OR EQUIV PRESENT IN MEDICAL RECORD: ICD-10-PCS | Mod: S$GLB,,, | Performed by: NURSE PRACTITIONER

## 2021-06-03 PROCEDURE — 3008F PR BODY MASS INDEX (BMI) DOCUMENTED: ICD-10-PCS | Mod: CPTII,S$GLB,, | Performed by: NURSE PRACTITIONER

## 2021-06-03 PROCEDURE — 3288F PR FALLS RISK ASSESSMENT DOCUMENTED: ICD-10-PCS | Mod: CPTII,S$GLB,, | Performed by: NURSE PRACTITIONER

## 2021-06-03 PROCEDURE — 1159F PR MEDICATION LIST DOCUMENTED IN MEDICAL RECORD: ICD-10-PCS | Mod: S$GLB,,, | Performed by: NURSE PRACTITIONER

## 2021-06-03 PROCEDURE — 99214 OFFICE O/P EST MOD 30 MIN: CPT | Mod: S$GLB,,, | Performed by: NURSE PRACTITIONER

## 2021-06-03 PROCEDURE — 3074F PR MOST RECENT SYSTOLIC BLOOD PRESSURE < 130 MM HG: ICD-10-PCS | Mod: CPTII,S$GLB,, | Performed by: NURSE PRACTITIONER

## 2021-06-03 PROCEDURE — 3074F SYST BP LT 130 MM HG: CPT | Mod: CPTII,S$GLB,, | Performed by: NURSE PRACTITIONER

## 2021-06-23 RX ORDER — AMLODIPINE AND VALSARTAN 5; 320 MG/1; MG/1
1 TABLET ORAL DAILY
Qty: 90 TABLET | Refills: 3 | Status: SHIPPED | OUTPATIENT
Start: 2021-06-23 | End: 2022-06-15

## 2021-07-20 ENCOUNTER — OFFICE VISIT (OUTPATIENT)
Dept: FAMILY MEDICINE | Facility: CLINIC | Age: 71
End: 2021-07-20
Payer: MEDICARE

## 2021-07-20 VITALS
TEMPERATURE: 98 F | OXYGEN SATURATION: 94 % | HEART RATE: 66 BPM | DIASTOLIC BLOOD PRESSURE: 70 MMHG | WEIGHT: 262.13 LBS | SYSTOLIC BLOOD PRESSURE: 126 MMHG | HEIGHT: 70 IN | BODY MASS INDEX: 37.53 KG/M2

## 2021-07-20 DIAGNOSIS — F32.A ANXIETY AND DEPRESSION: ICD-10-CM

## 2021-07-20 DIAGNOSIS — E66.01 MORBID OBESITY, UNSPECIFIED OBESITY TYPE: ICD-10-CM

## 2021-07-20 DIAGNOSIS — F41.9 ANXIETY AND DEPRESSION: ICD-10-CM

## 2021-07-20 DIAGNOSIS — R53.83 OTHER FATIGUE: ICD-10-CM

## 2021-07-20 DIAGNOSIS — I10 ESSENTIAL HYPERTENSION: Primary | ICD-10-CM

## 2021-07-20 PROCEDURE — 99214 OFFICE O/P EST MOD 30 MIN: CPT | Mod: S$GLB,,, | Performed by: FAMILY MEDICINE

## 2021-07-20 PROCEDURE — 99499 RISK ADDL DX/OHS AUDIT: ICD-10-PCS | Mod: HCNC,S$GLB,, | Performed by: FAMILY MEDICINE

## 2021-07-20 PROCEDURE — 99214 PR OFFICE/OUTPT VISIT, EST, LEVL IV, 30-39 MIN: ICD-10-PCS | Mod: S$GLB,,, | Performed by: FAMILY MEDICINE

## 2021-07-20 PROCEDURE — 99999 PR PBB SHADOW E&M-EST. PATIENT-LVL III: ICD-10-PCS | Mod: PBBFAC,,, | Performed by: FAMILY MEDICINE

## 2021-07-20 PROCEDURE — 1159F MED LIST DOCD IN RCRD: CPT | Mod: CPTII,S$GLB,, | Performed by: FAMILY MEDICINE

## 2021-07-20 PROCEDURE — 1126F PR PAIN SEVERITY QUANTIFIED, NO PAIN PRESENT: ICD-10-PCS | Mod: CPTII,S$GLB,, | Performed by: FAMILY MEDICINE

## 2021-07-20 PROCEDURE — 99999 PR PBB SHADOW E&M-EST. PATIENT-LVL III: CPT | Mod: PBBFAC,,, | Performed by: FAMILY MEDICINE

## 2021-07-20 PROCEDURE — 3078F PR MOST RECENT DIASTOLIC BLOOD PRESSURE < 80 MM HG: ICD-10-PCS | Mod: CPTII,S$GLB,, | Performed by: FAMILY MEDICINE

## 2021-07-20 PROCEDURE — 1126F AMNT PAIN NOTED NONE PRSNT: CPT | Mod: CPTII,S$GLB,, | Performed by: FAMILY MEDICINE

## 2021-07-20 PROCEDURE — 3288F FALL RISK ASSESSMENT DOCD: CPT | Mod: CPTII,S$GLB,, | Performed by: FAMILY MEDICINE

## 2021-07-20 PROCEDURE — 3078F DIAST BP <80 MM HG: CPT | Mod: CPTII,S$GLB,, | Performed by: FAMILY MEDICINE

## 2021-07-20 PROCEDURE — 99499 UNLISTED E&M SERVICE: CPT | Mod: HCNC,S$GLB,, | Performed by: FAMILY MEDICINE

## 2021-07-20 PROCEDURE — 1157F PR ADVANCE CARE PLAN OR EQUIV PRESENT IN MEDICAL RECORD: ICD-10-PCS | Mod: CPTII,S$GLB,, | Performed by: FAMILY MEDICINE

## 2021-07-20 PROCEDURE — 3288F PR FALLS RISK ASSESSMENT DOCUMENTED: ICD-10-PCS | Mod: CPTII,S$GLB,, | Performed by: FAMILY MEDICINE

## 2021-07-20 PROCEDURE — 3008F BODY MASS INDEX DOCD: CPT | Mod: CPTII,S$GLB,, | Performed by: FAMILY MEDICINE

## 2021-07-20 PROCEDURE — 3074F PR MOST RECENT SYSTOLIC BLOOD PRESSURE < 130 MM HG: ICD-10-PCS | Mod: CPTII,S$GLB,, | Performed by: FAMILY MEDICINE

## 2021-07-20 PROCEDURE — 3074F SYST BP LT 130 MM HG: CPT | Mod: CPTII,S$GLB,, | Performed by: FAMILY MEDICINE

## 2021-07-20 PROCEDURE — 1157F ADVNC CARE PLAN IN RCRD: CPT | Mod: CPTII,S$GLB,, | Performed by: FAMILY MEDICINE

## 2021-07-20 PROCEDURE — 1101F PT FALLS ASSESS-DOCD LE1/YR: CPT | Mod: CPTII,S$GLB,, | Performed by: FAMILY MEDICINE

## 2021-07-20 PROCEDURE — 1159F PR MEDICATION LIST DOCUMENTED IN MEDICAL RECORD: ICD-10-PCS | Mod: CPTII,S$GLB,, | Performed by: FAMILY MEDICINE

## 2021-07-20 PROCEDURE — 3008F PR BODY MASS INDEX (BMI) DOCUMENTED: ICD-10-PCS | Mod: CPTII,S$GLB,, | Performed by: FAMILY MEDICINE

## 2021-07-20 PROCEDURE — 1101F PR PT FALLS ASSESS DOC 0-1 FALLS W/OUT INJ PAST YR: ICD-10-PCS | Mod: CPTII,S$GLB,, | Performed by: FAMILY MEDICINE

## 2021-07-20 RX ORDER — FUROSEMIDE 20 MG/1
20 TABLET ORAL DAILY
COMMUNITY
Start: 2021-05-07 | End: 2021-07-20

## 2021-07-23 LAB
BASOPHILS # BLD AUTO: 40 CELLS/UL (ref 0–200)
BASOPHILS NFR BLD AUTO: 1 %
BUN SERPL-MCNC: 18 MG/DL (ref 7–25)
BUN/CREAT SERPL: ABNORMAL (CALC) (ref 6–22)
CALCIUM SERPL-MCNC: 9.4 MG/DL (ref 8.6–10.3)
CHLORIDE SERPL-SCNC: 103 MMOL/L (ref 98–110)
CHOLEST SERPL-MCNC: 163 MG/DL
CHOLEST/HDLC SERPL: 3.8 (CALC)
CO2 SERPL-SCNC: 30 MMOL/L (ref 20–32)
CREAT SERPL-MCNC: 0.84 MG/DL (ref 0.7–1.25)
EOSINOPHIL # BLD AUTO: 92 CELLS/UL (ref 15–500)
EOSINOPHIL NFR BLD AUTO: 2.3 %
ERYTHROCYTE [DISTWIDTH] IN BLOOD BY AUTOMATED COUNT: 12.2 % (ref 11–15)
GLUCOSE SERPL-MCNC: 107 MG/DL (ref 65–99)
HCT VFR BLD AUTO: 40.2 % (ref 38.5–50)
HCV AB S/CO SERPL IA: 0.03
HCV AB SERPL QL IA: NORMAL
HDLC SERPL-MCNC: 43 MG/DL
HGB BLD-MCNC: 14.3 G/DL (ref 13.2–17.1)
LDLC SERPL CALC-MCNC: 92 MG/DL (CALC)
LYMPHOCYTES # BLD AUTO: 1052 CELLS/UL (ref 850–3900)
LYMPHOCYTES NFR BLD AUTO: 26.3 %
MCH RBC QN AUTO: 32.4 PG (ref 27–33)
MCHC RBC AUTO-ENTMCNC: 35.6 G/DL (ref 32–36)
MCV RBC AUTO: 91 FL (ref 80–100)
MONOCYTES # BLD AUTO: 212 CELLS/UL (ref 200–950)
MONOCYTES NFR BLD AUTO: 5.3 %
NEUTROPHILS # BLD AUTO: 2604 CELLS/UL (ref 1500–7800)
NEUTROPHILS NFR BLD AUTO: 65.1 %
NONHDLC SERPL-MCNC: 120 MG/DL (CALC)
PLATELET # BLD AUTO: 218 THOUSAND/UL (ref 140–400)
PMV BLD REES-ECKER: 9.8 FL (ref 7.5–12.5)
POTASSIUM SERPL-SCNC: 4.2 MMOL/L (ref 3.5–5.3)
PSA SERPL-MCNC: 0.2 NG/ML
RBC # BLD AUTO: 4.42 MILLION/UL (ref 4.2–5.8)
SODIUM SERPL-SCNC: 141 MMOL/L (ref 135–146)
TRIGL SERPL-MCNC: 181 MG/DL
TSH SERPL-ACNC: 2.55 MIU/L (ref 0.4–4.5)
WBC # BLD AUTO: 4 THOUSAND/UL (ref 3.8–10.8)

## 2021-07-26 ENCOUNTER — TELEPHONE (OUTPATIENT)
Dept: FAMILY MEDICINE | Facility: CLINIC | Age: 71
End: 2021-07-26

## 2021-09-29 ENCOUNTER — IMMUNIZATION (OUTPATIENT)
Dept: FAMILY MEDICINE | Facility: CLINIC | Age: 71
End: 2021-09-29
Payer: MEDICARE

## 2021-09-29 DIAGNOSIS — Z23 NEED FOR VACCINATION: Primary | ICD-10-CM

## 2021-09-29 PROCEDURE — 91300 COVID-19, MRNA, LNP-S, PF, 30 MCG/0.3 ML DOSE VACCINE: CPT | Mod: HCNC,PBBFAC | Performed by: FAMILY MEDICINE

## 2021-09-29 PROCEDURE — 0003A COVID-19, MRNA, LNP-S, PF, 30 MCG/0.3 ML DOSE VACCINE: CPT | Mod: HCNC,PBBFAC | Performed by: FAMILY MEDICINE

## 2021-11-02 DIAGNOSIS — R10.9 AP (ABDOMINAL PAIN): Primary | ICD-10-CM

## 2021-11-10 ENCOUNTER — HOSPITAL ENCOUNTER (OUTPATIENT)
Dept: RADIOLOGY | Facility: HOSPITAL | Age: 71
Discharge: HOME | End: 2021-11-10
Attending: INTERNAL MEDICINE
Payer: MEDICARE

## 2021-11-10 DIAGNOSIS — R10.9 AP (ABDOMINAL PAIN): ICD-10-CM

## 2021-11-10 PROCEDURE — 76700 US EXAM ABDOM COMPLETE: CPT | Mod: TC,PO

## 2021-11-11 ENCOUNTER — OFFICE VISIT (OUTPATIENT)
Dept: FAMILY MEDICINE | Facility: CLINIC | Age: 71
End: 2021-11-11
Payer: MEDICARE

## 2021-11-11 VITALS
RESPIRATION RATE: 18 BRPM | BODY MASS INDEX: 37.28 KG/M2 | DIASTOLIC BLOOD PRESSURE: 86 MMHG | HEART RATE: 72 BPM | WEIGHT: 260.38 LBS | SYSTOLIC BLOOD PRESSURE: 134 MMHG | HEIGHT: 70 IN | OXYGEN SATURATION: 95 %

## 2021-11-11 DIAGNOSIS — R10.9 FLANK PAIN: ICD-10-CM

## 2021-11-11 DIAGNOSIS — Z12.5 ENCOUNTER FOR PROSTATE CANCER SCREENING: ICD-10-CM

## 2021-11-11 DIAGNOSIS — R10.30 LOWER ABDOMINAL PAIN: Primary | ICD-10-CM

## 2021-11-11 DIAGNOSIS — K76.89 LIVER CYST: ICD-10-CM

## 2021-11-11 DIAGNOSIS — Z23 NEED FOR INFLUENZA VACCINATION: ICD-10-CM

## 2021-11-11 DIAGNOSIS — K76.0 HEPATIC STEATOSIS: ICD-10-CM

## 2021-11-11 DIAGNOSIS — R93.2 ABNORMAL FINDINGS ON DIAGNOSTIC IMAGING OF LIVER AND BILIARY TRACT: ICD-10-CM

## 2021-11-11 PROCEDURE — 1101F PR PT FALLS ASSESS DOC 0-1 FALLS W/OUT INJ PAST YR: ICD-10-PCS | Mod: HCNC,CPTII,S$GLB, | Performed by: STUDENT IN AN ORGANIZED HEALTH CARE EDUCATION/TRAINING PROGRAM

## 2021-11-11 PROCEDURE — 99999 PR PBB SHADOW E&M-EST. PATIENT-LVL V: CPT | Mod: PBBFAC,HCNC,, | Performed by: STUDENT IN AN ORGANIZED HEALTH CARE EDUCATION/TRAINING PROGRAM

## 2021-11-11 PROCEDURE — 81001 URINALYSIS AUTO W/SCOPE: CPT | Mod: HCNC | Performed by: STUDENT IN AN ORGANIZED HEALTH CARE EDUCATION/TRAINING PROGRAM

## 2021-11-11 PROCEDURE — 90694 FLU VACCINE - QUADRIVALENT - ADJUVANTED: ICD-10-PCS | Mod: HCNC,S$GLB,, | Performed by: STUDENT IN AN ORGANIZED HEALTH CARE EDUCATION/TRAINING PROGRAM

## 2021-11-11 PROCEDURE — 99214 OFFICE O/P EST MOD 30 MIN: CPT | Mod: 25,HCNC,S$GLB, | Performed by: STUDENT IN AN ORGANIZED HEALTH CARE EDUCATION/TRAINING PROGRAM

## 2021-11-11 PROCEDURE — 1126F PR PAIN SEVERITY QUANTIFIED, NO PAIN PRESENT: ICD-10-PCS | Mod: HCNC,CPTII,S$GLB, | Performed by: STUDENT IN AN ORGANIZED HEALTH CARE EDUCATION/TRAINING PROGRAM

## 2021-11-11 PROCEDURE — 3079F PR MOST RECENT DIASTOLIC BLOOD PRESSURE 80-89 MM HG: ICD-10-PCS | Mod: HCNC,CPTII,S$GLB, | Performed by: STUDENT IN AN ORGANIZED HEALTH CARE EDUCATION/TRAINING PROGRAM

## 2021-11-11 PROCEDURE — 3288F FALL RISK ASSESSMENT DOCD: CPT | Mod: HCNC,CPTII,S$GLB, | Performed by: STUDENT IN AN ORGANIZED HEALTH CARE EDUCATION/TRAINING PROGRAM

## 2021-11-11 PROCEDURE — 3008F BODY MASS INDEX DOCD: CPT | Mod: HCNC,CPTII,S$GLB, | Performed by: STUDENT IN AN ORGANIZED HEALTH CARE EDUCATION/TRAINING PROGRAM

## 2021-11-11 PROCEDURE — 3075F PR MOST RECENT SYSTOLIC BLOOD PRESS GE 130-139MM HG: ICD-10-PCS | Mod: HCNC,CPTII,S$GLB, | Performed by: STUDENT IN AN ORGANIZED HEALTH CARE EDUCATION/TRAINING PROGRAM

## 2021-11-11 PROCEDURE — 1160F RVW MEDS BY RX/DR IN RCRD: CPT | Mod: HCNC,CPTII,S$GLB, | Performed by: STUDENT IN AN ORGANIZED HEALTH CARE EDUCATION/TRAINING PROGRAM

## 2021-11-11 PROCEDURE — 1160F PR REVIEW ALL MEDS BY PRESCRIBER/CLIN PHARMACIST DOCUMENTED: ICD-10-PCS | Mod: HCNC,CPTII,S$GLB, | Performed by: STUDENT IN AN ORGANIZED HEALTH CARE EDUCATION/TRAINING PROGRAM

## 2021-11-11 PROCEDURE — 1157F PR ADVANCE CARE PLAN OR EQUIV PRESENT IN MEDICAL RECORD: ICD-10-PCS | Mod: HCNC,CPTII,S$GLB, | Performed by: STUDENT IN AN ORGANIZED HEALTH CARE EDUCATION/TRAINING PROGRAM

## 2021-11-11 PROCEDURE — G0008 ADMIN INFLUENZA VIRUS VAC: HCPCS | Mod: HCNC,S$GLB,, | Performed by: STUDENT IN AN ORGANIZED HEALTH CARE EDUCATION/TRAINING PROGRAM

## 2021-11-11 PROCEDURE — 3079F DIAST BP 80-89 MM HG: CPT | Mod: HCNC,CPTII,S$GLB, | Performed by: STUDENT IN AN ORGANIZED HEALTH CARE EDUCATION/TRAINING PROGRAM

## 2021-11-11 PROCEDURE — 3288F PR FALLS RISK ASSESSMENT DOCUMENTED: ICD-10-PCS | Mod: HCNC,CPTII,S$GLB, | Performed by: STUDENT IN AN ORGANIZED HEALTH CARE EDUCATION/TRAINING PROGRAM

## 2021-11-11 PROCEDURE — 1101F PT FALLS ASSESS-DOCD LE1/YR: CPT | Mod: HCNC,CPTII,S$GLB, | Performed by: STUDENT IN AN ORGANIZED HEALTH CARE EDUCATION/TRAINING PROGRAM

## 2021-11-11 PROCEDURE — 1159F MED LIST DOCD IN RCRD: CPT | Mod: HCNC,CPTII,S$GLB, | Performed by: STUDENT IN AN ORGANIZED HEALTH CARE EDUCATION/TRAINING PROGRAM

## 2021-11-11 PROCEDURE — 4010F PR ACE/ARB THEARPY RXD/TAKEN: ICD-10-PCS | Mod: HCNC,CPTII,S$GLB, | Performed by: STUDENT IN AN ORGANIZED HEALTH CARE EDUCATION/TRAINING PROGRAM

## 2021-11-11 PROCEDURE — 1126F AMNT PAIN NOTED NONE PRSNT: CPT | Mod: HCNC,CPTII,S$GLB, | Performed by: STUDENT IN AN ORGANIZED HEALTH CARE EDUCATION/TRAINING PROGRAM

## 2021-11-11 PROCEDURE — 1159F PR MEDICATION LIST DOCUMENTED IN MEDICAL RECORD: ICD-10-PCS | Mod: HCNC,CPTII,S$GLB, | Performed by: STUDENT IN AN ORGANIZED HEALTH CARE EDUCATION/TRAINING PROGRAM

## 2021-11-11 PROCEDURE — 4010F ACE/ARB THERAPY RXD/TAKEN: CPT | Mod: HCNC,CPTII,S$GLB, | Performed by: STUDENT IN AN ORGANIZED HEALTH CARE EDUCATION/TRAINING PROGRAM

## 2021-11-11 PROCEDURE — 99214 PR OFFICE/OUTPT VISIT, EST, LEVL IV, 30-39 MIN: ICD-10-PCS | Mod: 25,HCNC,S$GLB, | Performed by: STUDENT IN AN ORGANIZED HEALTH CARE EDUCATION/TRAINING PROGRAM

## 2021-11-11 PROCEDURE — 3075F SYST BP GE 130 - 139MM HG: CPT | Mod: HCNC,CPTII,S$GLB, | Performed by: STUDENT IN AN ORGANIZED HEALTH CARE EDUCATION/TRAINING PROGRAM

## 2021-11-11 PROCEDURE — 1157F ADVNC CARE PLAN IN RCRD: CPT | Mod: HCNC,CPTII,S$GLB, | Performed by: STUDENT IN AN ORGANIZED HEALTH CARE EDUCATION/TRAINING PROGRAM

## 2021-11-11 PROCEDURE — 90694 VACC AIIV4 NO PRSRV 0.5ML IM: CPT | Mod: HCNC,S$GLB,, | Performed by: STUDENT IN AN ORGANIZED HEALTH CARE EDUCATION/TRAINING PROGRAM

## 2021-11-11 PROCEDURE — G0008 FLU VACCINE - QUADRIVALENT - ADJUVANTED: ICD-10-PCS | Mod: HCNC,S$GLB,, | Performed by: STUDENT IN AN ORGANIZED HEALTH CARE EDUCATION/TRAINING PROGRAM

## 2021-11-11 PROCEDURE — 99999 PR PBB SHADOW E&M-EST. PATIENT-LVL V: ICD-10-PCS | Mod: PBBFAC,HCNC,, | Performed by: STUDENT IN AN ORGANIZED HEALTH CARE EDUCATION/TRAINING PROGRAM

## 2021-11-11 PROCEDURE — 3008F PR BODY MASS INDEX (BMI) DOCUMENTED: ICD-10-PCS | Mod: HCNC,CPTII,S$GLB, | Performed by: STUDENT IN AN ORGANIZED HEALTH CARE EDUCATION/TRAINING PROGRAM

## 2021-11-11 RX ORDER — DICYCLOMINE HYDROCHLORIDE 20 MG/1
TABLET ORAL
COMMUNITY
End: 2022-08-23

## 2021-11-12 LAB
BACTERIA #/AREA URNS AUTO: NORMAL /HPF
BILIRUB UR QL STRIP: NEGATIVE
CLARITY UR REFRACT.AUTO: CLEAR
COLOR UR AUTO: ABNORMAL
GLUCOSE UR QL STRIP: NEGATIVE
HGB UR QL STRIP: NEGATIVE
HYALINE CASTS UR QL AUTO: 0 /LPF
KETONES UR QL STRIP: ABNORMAL
LEUKOCYTE ESTERASE UR QL STRIP: NEGATIVE
MICROSCOPIC COMMENT: NORMAL
NITRITE UR QL STRIP: NEGATIVE
PH UR STRIP: 5 [PH] (ref 5–8)
PROT UR QL STRIP: ABNORMAL
RBC #/AREA URNS AUTO: 0 /HPF (ref 0–4)
SP GR UR STRIP: 1.02 (ref 1–1.03)
URN SPEC COLLECT METH UR: ABNORMAL
WBC #/AREA URNS AUTO: 1 /HPF (ref 0–5)

## 2021-12-07 ENCOUNTER — OFFICE VISIT (OUTPATIENT)
Dept: CARDIOLOGY | Facility: CLINIC | Age: 71
End: 2021-12-07
Payer: MEDICARE

## 2021-12-07 VITALS
WEIGHT: 261 LBS | HEART RATE: 71 BPM | HEIGHT: 70 IN | SYSTOLIC BLOOD PRESSURE: 124 MMHG | RESPIRATION RATE: 16 BRPM | DIASTOLIC BLOOD PRESSURE: 82 MMHG | OXYGEN SATURATION: 94 % | BODY MASS INDEX: 37.37 KG/M2

## 2021-12-07 DIAGNOSIS — R06.02 SHORTNESS OF BREATH: ICD-10-CM

## 2021-12-07 DIAGNOSIS — G47.30 SLEEP APNEA, UNSPECIFIED TYPE: ICD-10-CM

## 2021-12-07 DIAGNOSIS — N40.1 BENIGN PROSTATIC HYPERPLASIA WITH URINARY OBSTRUCTION: ICD-10-CM

## 2021-12-07 DIAGNOSIS — E78.00 PURE HYPERCHOLESTEROLEMIA: ICD-10-CM

## 2021-12-07 DIAGNOSIS — N13.8 BENIGN PROSTATIC HYPERPLASIA WITH URINARY OBSTRUCTION: ICD-10-CM

## 2021-12-07 DIAGNOSIS — I10 ESSENTIAL HYPERTENSION: ICD-10-CM

## 2021-12-07 DIAGNOSIS — F32.89 OTHER DEPRESSION: ICD-10-CM

## 2021-12-07 PROBLEM — F32.A DEPRESSION: Status: ACTIVE | Noted: 2021-12-07

## 2021-12-07 PROCEDURE — 4010F ACE/ARB THERAPY RXD/TAKEN: CPT | Mod: CPTII,S$GLB,, | Performed by: INTERNAL MEDICINE

## 2021-12-07 PROCEDURE — 99214 PR OFFICE/OUTPT VISIT, EST, LEVL IV, 30-39 MIN: ICD-10-PCS | Mod: S$GLB,,, | Performed by: INTERNAL MEDICINE

## 2021-12-07 PROCEDURE — 1157F PR ADVANCE CARE PLAN OR EQUIV PRESENT IN MEDICAL RECORD: ICD-10-PCS | Mod: CPTII,S$GLB,, | Performed by: INTERNAL MEDICINE

## 2021-12-07 PROCEDURE — 1157F ADVNC CARE PLAN IN RCRD: CPT | Mod: CPTII,S$GLB,, | Performed by: INTERNAL MEDICINE

## 2021-12-07 PROCEDURE — 4010F PR ACE/ARB THEARPY RXD/TAKEN: ICD-10-PCS | Mod: CPTII,S$GLB,, | Performed by: INTERNAL MEDICINE

## 2021-12-07 PROCEDURE — 99214 OFFICE O/P EST MOD 30 MIN: CPT | Mod: S$GLB,,, | Performed by: INTERNAL MEDICINE

## 2021-12-20 LAB
AFP-TM SERPL-MCNC: 3.9 NG/ML
ALBUMIN SERPL-MCNC: 4.7 G/DL (ref 3.6–5.1)
ALBUMIN/GLOB SERPL: 2 (CALC) (ref 1–2.5)
ALP SERPL-CCNC: 62 U/L (ref 35–144)
ALT SERPL-CCNC: 35 U/L (ref 9–46)
AST SERPL-CCNC: 24 U/L (ref 10–35)
BASOPHILS # BLD AUTO: 41 CELLS/UL (ref 0–200)
BASOPHILS NFR BLD AUTO: 0.9 %
BILIRUB SERPL-MCNC: 0.6 MG/DL (ref 0.2–1.2)
BUN SERPL-MCNC: 19 MG/DL (ref 7–25)
BUN/CREAT SERPL: ABNORMAL (CALC) (ref 6–22)
CALCIUM SERPL-MCNC: 9.2 MG/DL (ref 8.6–10.3)
CHLORIDE SERPL-SCNC: 103 MMOL/L (ref 98–110)
CO2 SERPL-SCNC: 26 MMOL/L (ref 20–32)
CREAT SERPL-MCNC: 0.8 MG/DL (ref 0.7–1.25)
EOSINOPHIL # BLD AUTO: 90 CELLS/UL (ref 15–500)
EOSINOPHIL NFR BLD AUTO: 2 %
ERYTHROCYTE [DISTWIDTH] IN BLOOD BY AUTOMATED COUNT: 11.8 % (ref 11–15)
GLOBULIN SER CALC-MCNC: 2.3 G/DL (CALC) (ref 1.9–3.7)
GLUCOSE SERPL-MCNC: 115 MG/DL (ref 65–99)
HCT VFR BLD AUTO: 42.7 % (ref 38.5–50)
HGB BLD-MCNC: 14.4 G/DL (ref 13.2–17.1)
LYMPHOCYTES # BLD AUTO: 1080 CELLS/UL (ref 850–3900)
LYMPHOCYTES NFR BLD AUTO: 24 %
MCH RBC QN AUTO: 30.6 PG (ref 27–33)
MCHC RBC AUTO-ENTMCNC: 33.7 G/DL (ref 32–36)
MCV RBC AUTO: 90.9 FL (ref 80–100)
MONOCYTES # BLD AUTO: 261 CELLS/UL (ref 200–950)
MONOCYTES NFR BLD AUTO: 5.8 %
NEUTROPHILS # BLD AUTO: 3029 CELLS/UL (ref 1500–7800)
NEUTROPHILS NFR BLD AUTO: 67.3 %
PLATELET # BLD AUTO: 218 THOUSAND/UL (ref 140–400)
PMV BLD REES-ECKER: 10.3 FL (ref 7.5–12.5)
POTASSIUM SERPL-SCNC: 4.3 MMOL/L (ref 3.5–5.3)
PROT SERPL-MCNC: 7 G/DL (ref 6.1–8.1)
PSA SERPL-MCNC: 0.18 NG/ML
RBC # BLD AUTO: 4.7 MILLION/UL (ref 4.2–5.8)
SODIUM SERPL-SCNC: 139 MMOL/L (ref 135–146)
WBC # BLD AUTO: 4.5 THOUSAND/UL (ref 3.8–10.8)

## 2022-01-28 ENCOUNTER — CLINICAL SUPPORT (OUTPATIENT)
Dept: FAMILY MEDICINE | Facility: CLINIC | Age: 72
End: 2022-01-28
Payer: MEDICARE

## 2022-01-28 DIAGNOSIS — Z20.822 EXPOSURE TO COVID-19 VIRUS: ICD-10-CM

## 2022-01-28 DIAGNOSIS — Z20.822 EXPOSURE TO COVID-19 VIRUS: Primary | ICD-10-CM

## 2022-01-28 PROCEDURE — U0005 INFEC AGEN DETEC AMPLI PROBE: HCPCS | Performed by: STUDENT IN AN ORGANIZED HEALTH CARE EDUCATION/TRAINING PROGRAM

## 2022-01-28 PROCEDURE — U0003 INFECTIOUS AGENT DETECTION BY NUCLEIC ACID (DNA OR RNA); SEVERE ACUTE RESPIRATORY SYNDROME CORONAVIRUS 2 (SARS-COV-2) (CORONAVIRUS DISEASE [COVID-19]), AMPLIFIED PROBE TECHNIQUE, MAKING USE OF HIGH THROUGHPUT TECHNOLOGIES AS DESCRIBED BY CMS-2020-01-R: HCPCS | Mod: HCNC | Performed by: STUDENT IN AN ORGANIZED HEALTH CARE EDUCATION/TRAINING PROGRAM

## 2022-01-28 NOTE — PROGRESS NOTES
Pt swabbed for covid 19. PCR test completed. I advised pt that the test results will back any where between 24-96 hours. Depending on the lab turn around. I advised him that results can be viewed via portal or someone from the office will call. All understanding voiced.

## 2022-01-29 DIAGNOSIS — U07.1 COVID-19 VIRUS DETECTED: ICD-10-CM

## 2022-01-29 LAB
SARS-COV-2 RNA RESP QL NAA+PROBE: DETECTED
SARS-COV-2- CYCLE NUMBER: 18

## 2022-01-31 ENCOUNTER — PATIENT MESSAGE (OUTPATIENT)
Dept: FAMILY MEDICINE | Facility: CLINIC | Age: 72
End: 2022-01-31
Payer: MEDICARE

## 2022-01-31 ENCOUNTER — TELEPHONE (OUTPATIENT)
Dept: FAMILY MEDICINE | Facility: CLINIC | Age: 72
End: 2022-01-31
Payer: MEDICARE

## 2022-01-31 DIAGNOSIS — U07.1 COVID-19: Primary | ICD-10-CM

## 2022-01-31 NOTE — TELEPHONE ENCOUNTER
I spoke with pt via phone. Lab results discussed.   Questions regarding quarantine time period. He states that he started feeling bad last Monday ( a week ago today). I advised him that technically his days are up. But you can test positive up to 90 days . He has all understanding. States that he will f/u if needed.

## 2022-02-16 DIAGNOSIS — R10.33 PERIUMBILICAL PAIN: Primary | ICD-10-CM

## 2022-03-04 ENCOUNTER — HOSPITAL ENCOUNTER (OUTPATIENT)
Dept: RADIOLOGY | Facility: HOSPITAL | Age: 72
Discharge: HOME | End: 2022-03-04
Attending: INTERNAL MEDICINE
Payer: MEDICARE

## 2022-03-04 DIAGNOSIS — R10.33 PERIUMBILICAL PAIN: ICD-10-CM

## 2022-03-04 LAB
CREAT SERPL-MCNC: 0.8 MG/DL (ref 0.5–1.4)
SAMPLE: NORMAL

## 2022-03-04 PROCEDURE — 82565 ASSAY OF CREATININE: CPT | Mod: PO

## 2022-03-04 PROCEDURE — 25500020 PHARM REV CODE 255: Mod: PO | Performed by: INTERNAL MEDICINE

## 2022-03-04 RX ADMIN — IOHEXOL 100 ML: 350 INJECTION, SOLUTION INTRAVENOUS at 08:03

## 2022-04-27 ENCOUNTER — TELEPHONE (OUTPATIENT)
Dept: FAMILY MEDICINE | Facility: CLINIC | Age: 72
End: 2022-04-27
Payer: MEDICARE

## 2022-04-27 NOTE — TELEPHONE ENCOUNTER
I called the patient to schedule their free one hour Enhanced Annual Wellness Visit with   Genevieve Damon NP.

## 2022-06-06 ENCOUNTER — OFFICE VISIT (OUTPATIENT)
Dept: FAMILY MEDICINE | Facility: CLINIC | Age: 72
End: 2022-06-06
Payer: MEDICARE

## 2022-06-06 ENCOUNTER — LAB VISIT (OUTPATIENT)
Dept: LAB | Facility: HOSPITAL | Age: 72
End: 2022-06-06
Attending: STUDENT IN AN ORGANIZED HEALTH CARE EDUCATION/TRAINING PROGRAM
Payer: MEDICARE

## 2022-06-06 VITALS
HEART RATE: 80 BPM | HEIGHT: 70 IN | OXYGEN SATURATION: 95 % | BODY MASS INDEX: 37.43 KG/M2 | RESPIRATION RATE: 18 BRPM | SYSTOLIC BLOOD PRESSURE: 126 MMHG | DIASTOLIC BLOOD PRESSURE: 76 MMHG | WEIGHT: 261.44 LBS

## 2022-06-06 DIAGNOSIS — R10.30 LOWER ABDOMINAL PAIN: Primary | ICD-10-CM

## 2022-06-06 DIAGNOSIS — R10.30 LOWER ABDOMINAL PAIN: ICD-10-CM

## 2022-06-06 DIAGNOSIS — K55.1 CHRONIC VASCULAR DISORDERS OF INTESTINE: ICD-10-CM

## 2022-06-06 PROCEDURE — 1125F PR PAIN SEVERITY QUANTIFIED, PAIN PRESENT: ICD-10-PCS | Mod: CPTII,S$GLB,, | Performed by: STUDENT IN AN ORGANIZED HEALTH CARE EDUCATION/TRAINING PROGRAM

## 2022-06-06 PROCEDURE — 99213 PR OFFICE/OUTPT VISIT, EST, LEVL III, 20-29 MIN: ICD-10-PCS | Mod: S$GLB,,, | Performed by: STUDENT IN AN ORGANIZED HEALTH CARE EDUCATION/TRAINING PROGRAM

## 2022-06-06 PROCEDURE — 1157F ADVNC CARE PLAN IN RCRD: CPT | Mod: CPTII,S$GLB,, | Performed by: STUDENT IN AN ORGANIZED HEALTH CARE EDUCATION/TRAINING PROGRAM

## 2022-06-06 PROCEDURE — 4010F PR ACE/ARB THEARPY RXD/TAKEN: ICD-10-PCS | Mod: CPTII,S$GLB,, | Performed by: STUDENT IN AN ORGANIZED HEALTH CARE EDUCATION/TRAINING PROGRAM

## 2022-06-06 PROCEDURE — 3288F FALL RISK ASSESSMENT DOCD: CPT | Mod: CPTII,S$GLB,, | Performed by: STUDENT IN AN ORGANIZED HEALTH CARE EDUCATION/TRAINING PROGRAM

## 2022-06-06 PROCEDURE — 3288F PR FALLS RISK ASSESSMENT DOCUMENTED: ICD-10-PCS | Mod: CPTII,S$GLB,, | Performed by: STUDENT IN AN ORGANIZED HEALTH CARE EDUCATION/TRAINING PROGRAM

## 2022-06-06 PROCEDURE — 1159F PR MEDICATION LIST DOCUMENTED IN MEDICAL RECORD: ICD-10-PCS | Mod: CPTII,S$GLB,, | Performed by: STUDENT IN AN ORGANIZED HEALTH CARE EDUCATION/TRAINING PROGRAM

## 2022-06-06 PROCEDURE — 1157F PR ADVANCE CARE PLAN OR EQUIV PRESENT IN MEDICAL RECORD: ICD-10-PCS | Mod: CPTII,S$GLB,, | Performed by: STUDENT IN AN ORGANIZED HEALTH CARE EDUCATION/TRAINING PROGRAM

## 2022-06-06 PROCEDURE — 3074F SYST BP LT 130 MM HG: CPT | Mod: CPTII,S$GLB,, | Performed by: STUDENT IN AN ORGANIZED HEALTH CARE EDUCATION/TRAINING PROGRAM

## 2022-06-06 PROCEDURE — 4010F ACE/ARB THERAPY RXD/TAKEN: CPT | Mod: CPTII,S$GLB,, | Performed by: STUDENT IN AN ORGANIZED HEALTH CARE EDUCATION/TRAINING PROGRAM

## 2022-06-06 PROCEDURE — 3008F BODY MASS INDEX DOCD: CPT | Mod: CPTII,S$GLB,, | Performed by: STUDENT IN AN ORGANIZED HEALTH CARE EDUCATION/TRAINING PROGRAM

## 2022-06-06 PROCEDURE — 1101F PT FALLS ASSESS-DOCD LE1/YR: CPT | Mod: CPTII,S$GLB,, | Performed by: STUDENT IN AN ORGANIZED HEALTH CARE EDUCATION/TRAINING PROGRAM

## 2022-06-06 PROCEDURE — 99213 OFFICE O/P EST LOW 20 MIN: CPT | Mod: S$GLB,,, | Performed by: STUDENT IN AN ORGANIZED HEALTH CARE EDUCATION/TRAINING PROGRAM

## 2022-06-06 PROCEDURE — 1101F PR PT FALLS ASSESS DOC 0-1 FALLS W/OUT INJ PAST YR: ICD-10-PCS | Mod: CPTII,S$GLB,, | Performed by: STUDENT IN AN ORGANIZED HEALTH CARE EDUCATION/TRAINING PROGRAM

## 2022-06-06 PROCEDURE — 1125F AMNT PAIN NOTED PAIN PRSNT: CPT | Mod: CPTII,S$GLB,, | Performed by: STUDENT IN AN ORGANIZED HEALTH CARE EDUCATION/TRAINING PROGRAM

## 2022-06-06 PROCEDURE — 99999 PR PBB SHADOW E&M-EST. PATIENT-LVL IV: ICD-10-PCS | Mod: PBBFAC,,, | Performed by: STUDENT IN AN ORGANIZED HEALTH CARE EDUCATION/TRAINING PROGRAM

## 2022-06-06 PROCEDURE — 3078F PR MOST RECENT DIASTOLIC BLOOD PRESSURE < 80 MM HG: ICD-10-PCS | Mod: CPTII,S$GLB,, | Performed by: STUDENT IN AN ORGANIZED HEALTH CARE EDUCATION/TRAINING PROGRAM

## 2022-06-06 PROCEDURE — 81001 URINALYSIS AUTO W/SCOPE: CPT | Performed by: STUDENT IN AN ORGANIZED HEALTH CARE EDUCATION/TRAINING PROGRAM

## 2022-06-06 PROCEDURE — 3078F DIAST BP <80 MM HG: CPT | Mod: CPTII,S$GLB,, | Performed by: STUDENT IN AN ORGANIZED HEALTH CARE EDUCATION/TRAINING PROGRAM

## 2022-06-06 PROCEDURE — 99999 PR PBB SHADOW E&M-EST. PATIENT-LVL IV: CPT | Mod: PBBFAC,,, | Performed by: STUDENT IN AN ORGANIZED HEALTH CARE EDUCATION/TRAINING PROGRAM

## 2022-06-06 PROCEDURE — 3008F PR BODY MASS INDEX (BMI) DOCUMENTED: ICD-10-PCS | Mod: CPTII,S$GLB,, | Performed by: STUDENT IN AN ORGANIZED HEALTH CARE EDUCATION/TRAINING PROGRAM

## 2022-06-06 PROCEDURE — 3074F PR MOST RECENT SYSTOLIC BLOOD PRESSURE < 130 MM HG: ICD-10-PCS | Mod: CPTII,S$GLB,, | Performed by: STUDENT IN AN ORGANIZED HEALTH CARE EDUCATION/TRAINING PROGRAM

## 2022-06-06 PROCEDURE — 1159F MED LIST DOCD IN RCRD: CPT | Mod: CPTII,S$GLB,, | Performed by: STUDENT IN AN ORGANIZED HEALTH CARE EDUCATION/TRAINING PROGRAM

## 2022-06-06 NOTE — PROGRESS NOTES
[facility] MEDICINE CLINIC NOTE    Patient Name: Sammy Muniz  YOB: 1950    PRESENTING HISTORY   Chief Complaint:   Chief Complaint   Patient presents with    Abdominal Pain     Only after eating         History of Present Illness:  Mr. Sammy Muniz is a 71 y.o. male     Post prandial abdominal and back pain.   Lasts into next day.   Yesterday happened after eating pork roast and gravy.   Time before that ate at [employer].   Vegetarian salad.     Saw Dr. Dixon.   Had EGD.   US  CT abdomen    This AM small episode- ate banana bread. One episode.     Lower abdominal pain.   Across lower back.     Hx gastric sleeve.                                          Review of Systems   All other systems reviewed and are negative.        PAST HISTORY:     Past Medical History:   Diagnosis Date    Anxiety and depression     Arthritis     b/l Knee and Shoulder replacements    BPH (benign prostatic hyperplasia)     Hyperlipidemia     Hypertension     Kidney stone     Sleep apnea     Doews not use C-pap       Past Surgical History:   Procedure Laterality Date    ARTHROPLASTY OF SHOULDER Left 9/24/2019    Procedure: ARTHROPLASTY, SHOULDER;  Surgeon: Abad Miller Jr., MD;  Location: [facility];  Service: Orthopedics;  Laterality: Left;  [employer] Moses notified 9/20-tcb    CERVICAL FUSION      CYSTOSCOPY      GASTRIC BYPASS  2012    HERNIA REPAIR      ventral hernia    JOINT REPLACEMENT      Bilateral knees & Rt shoulder    PROSTATE SURGERY      greenlight laser    TONSILLECTOMY      TOTAL KNEE ARTHROPLASTY  left    TOTAL SHOULDER ARTHROPLASTY  right    TYMPANOPLASTY  left    VASECTOMY         Family History   Problem Relation Age of Onset    Benign prostatic hyperplasia Brother     Heart disease Mother     Kidney disease Mother     Arthritis Mother     Mental illness Father 84        dementia    Diabetes Father     Hypertension Father     Heart disease Father      "Cancer Father 75        colon    Rectal cancer Brother     Prostate cancer Neg Hx     Urolithiasis Neg Hx     Kidney cancer Neg Hx        Social History     Socioeconomic History    Marital status:    Tobacco Use    Smoking status: Never Smoker    Smokeless tobacco: Never Used   Substance and Sexual Activity    Alcohol use: No    Drug use: No       MEDICATIONS & ALLERGIES:     Current Outpatient Medications on File Prior to Visit   Medication Sig    amlodipine-valsartan (EXFORGE) 5-320 mg per tablet Take 1 tablet by mouth once daily.    aspirin 325 MG tablet Take 1 tablet (325 mg total) by mouth once daily.    celecoxib (CELEBREX) 100 MG capsule TAKE 1 CAPSULE ONE TIME DAILY    citalopram (CELEXA) 20 MG tablet TAKE 1 TABLET ONE TIME DAILY    dicyclomine (BENTYL) 20 mg tablet dicyclomine 20 mg tablet   Take 1 tablet 4 times a day by oral route as needed for 30 days.    fish oil-dha-epa 1,200-144-216 mg Cap Take 1 capsule by mouth 2 (two) times daily.    labetaloL (NORMODYNE) 200 MG tablet TAKE 1 TABLET TWICE DAILY    pantoprazole (PROTONIX) 40 MG tablet TAKE 1 TABLET DAILY    potassium chloride SA (K-DUR,KLOR-CON) 20 MEQ tablet TAKE 1 TABLET TWICE DAILY    pravastatin (PRAVACHOL) 40 MG tablet TAKE 1 TABLET ONE TIME DAILY IN THE MORNING    spironolactone (ALDACTONE) 50 MG tablet TAKE 1 TABLET ONE TIME DAILY    tamsulosin (FLOMAX) 0.4 mg Cap TAKE 1 CAPSULE EVERY EVENING     No current facility-administered medications on file prior to visit.       Review of patient's allergies indicates:   Allergen Reactions    Iodinated contrast media Itching       OBJECTIVE:   Vital Signs:  Vitals:    06/06/22 1348   BP: 126/76   Pulse: 80   Resp: 18   SpO2: 95%   Weight: 118.6 kg (261 lb 7.5 oz)   Height: 5' 10" (1.778 m)       No results found for this or any previous visit (from the past 24 hour(s)).      Physical Exam  Vitals and nursing note reviewed.   Constitutional:       General: He is not in " acute distress.     Appearance: He is not ill-appearing, toxic-appearing or diaphoretic.   HENT:      Head: Normocephalic and atraumatic.      Right Ear: External ear normal.      Left Ear: External ear normal.   Eyes:      General: No scleral icterus.     Conjunctiva/sclera: Conjunctivae normal.      Pupils: Pupils are equal, round, and reactive to light.   Neck:      Thyroid: No thyromegaly.   Cardiovascular:      Rate and Rhythm: Normal rate and regular rhythm.      Heart sounds: Normal heart sounds. No murmur heard.  Pulmonary:      Effort: Pulmonary effort is normal. No respiratory distress.      Breath sounds: Normal breath sounds. No wheezing or rales.   Abdominal:      General: Abdomen is flat. Bowel sounds are normal. There is no distension.      Palpations: Abdomen is soft.      Tenderness: There is no abdominal tenderness. There is no right CVA tenderness or left CVA tenderness.   Musculoskeletal:         General: No tenderness or deformity. Normal range of motion.      Cervical back: Normal range of motion and neck supple.   Lymphadenopathy:      Cervical: No cervical adenopathy.   Skin:     General: Skin is warm and dry.      Findings: No erythema or rash.   Neurological:      Mental Status: He is alert and oriented to person, place, and time.      Gait: Gait is intact.   Psychiatric:         Mood and Affect: Mood and affect normal.         Cognition and Memory: Memory normal.         Judgment: Judgment normal.         ASSESSMENT & PLAN:     Lower abdominal pain  -     MRA Abdomen w/Contrast; Future; Expected date: 06/06/2022  -     Urinalysis; Future; Expected date: 06/06/2022  -     Pancreatic elastase, fecal; Future; Expected date: 06/06/2022  -     Calprotectin, Stool; Future; Expected date: 06/06/2022  -     Clostridium difficile EIA; Future; Expected date: 06/06/2022    Chronic vascular disorders of intestine   -     MRA Abdomen w/Contrast; Future; Expected date: 06/06/2022      He has already had  unrevealing extensive workup.   The only thing I can think of is that he is having some sort of intestinal ischemia.   Unable to do CTA due to current contrast shortage. Will do MRI.   He has had mult prior procedures but reports all hardware is MRI compatible.      Fab Tompkins MD   Internal Medicine    This note was created using Dragon voice recognition software that occasionally misinterprets phrases or words.

## 2022-06-07 LAB
BACTERIA #/AREA URNS AUTO: ABNORMAL /HPF
BILIRUB UR QL STRIP: NEGATIVE
CLARITY UR REFRACT.AUTO: CLEAR
COLOR UR AUTO: ABNORMAL
GLUCOSE UR QL STRIP: NEGATIVE
HGB UR QL STRIP: NEGATIVE
HYALINE CASTS UR QL AUTO: 9 /LPF
KETONES UR QL STRIP: ABNORMAL
LEUKOCYTE ESTERASE UR QL STRIP: NEGATIVE
MICROSCOPIC COMMENT: ABNORMAL
NITRITE UR QL STRIP: NEGATIVE
PH UR STRIP: 5 [PH] (ref 5–8)
PROT UR QL STRIP: ABNORMAL
RBC #/AREA URNS AUTO: 0 /HPF (ref 0–4)
SP GR UR STRIP: 1.02 (ref 1–1.03)
SQUAMOUS #/AREA URNS AUTO: 0 /HPF
URN SPEC COLLECT METH UR: ABNORMAL
WBC #/AREA URNS AUTO: 2 /HPF (ref 0–5)

## 2022-06-08 ENCOUNTER — HOSPITAL ENCOUNTER (OUTPATIENT)
Dept: RADIOLOGY | Facility: HOSPITAL | Age: 72
Discharge: HOME | End: 2022-06-08
Attending: STUDENT IN AN ORGANIZED HEALTH CARE EDUCATION/TRAINING PROGRAM
Payer: MEDICARE

## 2022-06-08 ENCOUNTER — LAB VISIT (OUTPATIENT)
Dept: LAB | Facility: HOSPITAL | Age: 72
End: 2022-06-08
Attending: STUDENT IN AN ORGANIZED HEALTH CARE EDUCATION/TRAINING PROGRAM
Payer: MEDICARE

## 2022-06-08 DIAGNOSIS — K55.1 CHRONIC VASCULAR DISORDERS OF INTESTINE: ICD-10-CM

## 2022-06-08 DIAGNOSIS — R10.30 LOWER ABDOMINAL PAIN: ICD-10-CM

## 2022-06-08 PROCEDURE — 82656 EL-1 FECAL QUAL/SEMIQ: CPT | Performed by: STUDENT IN AN ORGANIZED HEALTH CARE EDUCATION/TRAINING PROGRAM

## 2022-06-08 PROCEDURE — 83993 ASSAY FOR CALPROTECTIN FECAL: CPT | Performed by: STUDENT IN AN ORGANIZED HEALTH CARE EDUCATION/TRAINING PROGRAM

## 2022-06-08 PROCEDURE — 87493 C DIFF AMPLIFIED PROBE: CPT | Performed by: STUDENT IN AN ORGANIZED HEALTH CARE EDUCATION/TRAINING PROGRAM

## 2022-06-08 PROCEDURE — 74185 MRA ABD W OR W/O CNTRST: CPT | Mod: 26,,, | Performed by: RADIOLOGY

## 2022-06-08 PROCEDURE — 74185 MRA ABD W OR W/O CNTRST: CPT | Mod: TC

## 2022-06-08 PROCEDURE — 25500020 PHARM REV CODE 255: Performed by: STUDENT IN AN ORGANIZED HEALTH CARE EDUCATION/TRAINING PROGRAM

## 2022-06-08 PROCEDURE — 87449 NOS EACH ORGANISM AG IA: CPT | Performed by: STUDENT IN AN ORGANIZED HEALTH CARE EDUCATION/TRAINING PROGRAM

## 2022-06-08 PROCEDURE — C8902 MRA W/O FOL W/CONT, ABD: HCPCS | Mod: TC

## 2022-06-08 PROCEDURE — 74185 MRA ABDOMEN W/WO CONTRAST: ICD-10-PCS | Mod: 26,,, | Performed by: RADIOLOGY

## 2022-06-08 PROCEDURE — A9585 GADOBUTROL INJECTION: HCPCS | Performed by: STUDENT IN AN ORGANIZED HEALTH CARE EDUCATION/TRAINING PROGRAM

## 2022-06-08 RX ORDER — GADOBUTROL 604.72 MG/ML
10 INJECTION INTRAVENOUS
Status: COMPLETED | OUTPATIENT
Start: 2022-06-08 | End: 2022-06-08

## 2022-06-08 RX ADMIN — GADOBUTROL 10 ML: 604.72 INJECTION INTRAVENOUS at 04:06

## 2022-06-09 ENCOUNTER — TELEPHONE (OUTPATIENT)
Dept: FAMILY MEDICINE | Facility: CLINIC | Age: 72
End: 2022-06-09
Payer: MEDICARE

## 2022-06-09 LAB
C DIFF GDH STL QL: POSITIVE
C DIFF TOX A+B STL QL IA: NEGATIVE
C DIFF TOX GENS STL QL NAA+PROBE: NEGATIVE

## 2022-06-09 NOTE — TELEPHONE ENCOUNTER
I spoke with pt via phone, I advised him of recommendations  per Dr. Tompkins. Will fax results to the gastro group.     ----- Message from Amalia Reyna sent at 6/9/2022  2:05 PM CDT -----  Contact: Pt  Type:  Patient Returning Call    Who Called:  Pt  Who Left Message for Patient:  Gladis  Does the patient know what this is regarding?:  No  Best Call Back Number:  [phone]  Additional Information:  Please call back.  Thanks.

## 2022-06-13 LAB — ELASTASE 1, FECAL: >500 MCG/G

## 2022-06-14 LAB — CALPROTECTIN STL-MCNT: 90.7 MCG/G

## 2022-08-01 ENCOUNTER — LAB VISIT (OUTPATIENT)
Dept: LAB | Facility: HOSPITAL | Age: 72
End: 2022-08-01
Attending: STUDENT IN AN ORGANIZED HEALTH CARE EDUCATION/TRAINING PROGRAM
Payer: MEDICARE

## 2022-08-01 ENCOUNTER — OFFICE VISIT (OUTPATIENT)
Dept: FAMILY MEDICINE | Facility: CLINIC | Age: 72
End: 2022-08-01
Payer: MEDICARE

## 2022-08-01 VITALS
HEART RATE: 68 BPM | BODY MASS INDEX: 37.37 KG/M2 | RESPIRATION RATE: 18 BRPM | WEIGHT: 261 LBS | DIASTOLIC BLOOD PRESSURE: 76 MMHG | OXYGEN SATURATION: 95 % | HEIGHT: 70 IN | SYSTOLIC BLOOD PRESSURE: 132 MMHG

## 2022-08-01 DIAGNOSIS — R10.84 GENERALIZED ABDOMINAL PAIN: ICD-10-CM

## 2022-08-01 DIAGNOSIS — R10.9 ABDOMINAL PAIN, UNSPECIFIED ABDOMINAL LOCATION: ICD-10-CM

## 2022-08-01 DIAGNOSIS — R10.84 GENERALIZED ABDOMINAL PAIN: Primary | ICD-10-CM

## 2022-08-01 PROCEDURE — 99213 OFFICE O/P EST LOW 20 MIN: CPT | Mod: S$GLB,,, | Performed by: STUDENT IN AN ORGANIZED HEALTH CARE EDUCATION/TRAINING PROGRAM

## 2022-08-01 PROCEDURE — 3008F PR BODY MASS INDEX (BMI) DOCUMENTED: ICD-10-PCS | Mod: CPTII,S$GLB,, | Performed by: STUDENT IN AN ORGANIZED HEALTH CARE EDUCATION/TRAINING PROGRAM

## 2022-08-01 PROCEDURE — 1125F PR PAIN SEVERITY QUANTIFIED, PAIN PRESENT: ICD-10-PCS | Mod: CPTII,S$GLB,, | Performed by: STUDENT IN AN ORGANIZED HEALTH CARE EDUCATION/TRAINING PROGRAM

## 2022-08-01 PROCEDURE — 3078F PR MOST RECENT DIASTOLIC BLOOD PRESSURE < 80 MM HG: ICD-10-PCS | Mod: CPTII,S$GLB,, | Performed by: STUDENT IN AN ORGANIZED HEALTH CARE EDUCATION/TRAINING PROGRAM

## 2022-08-01 PROCEDURE — 1125F AMNT PAIN NOTED PAIN PRSNT: CPT | Mod: CPTII,S$GLB,, | Performed by: STUDENT IN AN ORGANIZED HEALTH CARE EDUCATION/TRAINING PROGRAM

## 2022-08-01 PROCEDURE — 99999 PR PBB SHADOW E&M-EST. PATIENT-LVL IV: CPT | Mod: PBBFAC,,, | Performed by: STUDENT IN AN ORGANIZED HEALTH CARE EDUCATION/TRAINING PROGRAM

## 2022-08-01 PROCEDURE — 1159F PR MEDICATION LIST DOCUMENTED IN MEDICAL RECORD: ICD-10-PCS | Mod: CPTII,S$GLB,, | Performed by: STUDENT IN AN ORGANIZED HEALTH CARE EDUCATION/TRAINING PROGRAM

## 2022-08-01 PROCEDURE — 3078F DIAST BP <80 MM HG: CPT | Mod: CPTII,S$GLB,, | Performed by: STUDENT IN AN ORGANIZED HEALTH CARE EDUCATION/TRAINING PROGRAM

## 2022-08-01 PROCEDURE — 1101F PR PT FALLS ASSESS DOC 0-1 FALLS W/OUT INJ PAST YR: ICD-10-PCS | Mod: CPTII,S$GLB,, | Performed by: STUDENT IN AN ORGANIZED HEALTH CARE EDUCATION/TRAINING PROGRAM

## 2022-08-01 PROCEDURE — 3288F PR FALLS RISK ASSESSMENT DOCUMENTED: ICD-10-PCS | Mod: CPTII,S$GLB,, | Performed by: STUDENT IN AN ORGANIZED HEALTH CARE EDUCATION/TRAINING PROGRAM

## 2022-08-01 PROCEDURE — 1157F ADVNC CARE PLAN IN RCRD: CPT | Mod: CPTII,S$GLB,, | Performed by: STUDENT IN AN ORGANIZED HEALTH CARE EDUCATION/TRAINING PROGRAM

## 2022-08-01 PROCEDURE — 3075F PR MOST RECENT SYSTOLIC BLOOD PRESS GE 130-139MM HG: ICD-10-PCS | Mod: CPTII,S$GLB,, | Performed by: STUDENT IN AN ORGANIZED HEALTH CARE EDUCATION/TRAINING PROGRAM

## 2022-08-01 PROCEDURE — 99213 PR OFFICE/OUTPT VISIT, EST, LEVL III, 20-29 MIN: ICD-10-PCS | Mod: S$GLB,,, | Performed by: STUDENT IN AN ORGANIZED HEALTH CARE EDUCATION/TRAINING PROGRAM

## 2022-08-01 PROCEDURE — 80053 COMPREHEN METABOLIC PANEL: CPT | Performed by: STUDENT IN AN ORGANIZED HEALTH CARE EDUCATION/TRAINING PROGRAM

## 2022-08-01 PROCEDURE — 1157F PR ADVANCE CARE PLAN OR EQUIV PRESENT IN MEDICAL RECORD: ICD-10-PCS | Mod: CPTII,S$GLB,, | Performed by: STUDENT IN AN ORGANIZED HEALTH CARE EDUCATION/TRAINING PROGRAM

## 2022-08-01 PROCEDURE — 1101F PT FALLS ASSESS-DOCD LE1/YR: CPT | Mod: CPTII,S$GLB,, | Performed by: STUDENT IN AN ORGANIZED HEALTH CARE EDUCATION/TRAINING PROGRAM

## 2022-08-01 PROCEDURE — 1159F MED LIST DOCD IN RCRD: CPT | Mod: CPTII,S$GLB,, | Performed by: STUDENT IN AN ORGANIZED HEALTH CARE EDUCATION/TRAINING PROGRAM

## 2022-08-01 PROCEDURE — 85025 COMPLETE CBC W/AUTO DIFF WBC: CPT | Performed by: STUDENT IN AN ORGANIZED HEALTH CARE EDUCATION/TRAINING PROGRAM

## 2022-08-01 PROCEDURE — 4010F ACE/ARB THERAPY RXD/TAKEN: CPT | Mod: CPTII,S$GLB,, | Performed by: STUDENT IN AN ORGANIZED HEALTH CARE EDUCATION/TRAINING PROGRAM

## 2022-08-01 PROCEDURE — 83690 ASSAY OF LIPASE: CPT | Performed by: STUDENT IN AN ORGANIZED HEALTH CARE EDUCATION/TRAINING PROGRAM

## 2022-08-01 PROCEDURE — 3075F SYST BP GE 130 - 139MM HG: CPT | Mod: CPTII,S$GLB,, | Performed by: STUDENT IN AN ORGANIZED HEALTH CARE EDUCATION/TRAINING PROGRAM

## 2022-08-01 PROCEDURE — 4010F PR ACE/ARB THEARPY RXD/TAKEN: ICD-10-PCS | Mod: CPTII,S$GLB,, | Performed by: STUDENT IN AN ORGANIZED HEALTH CARE EDUCATION/TRAINING PROGRAM

## 2022-08-01 PROCEDURE — 36415 COLL VENOUS BLD VENIPUNCTURE: CPT | Mod: PO | Performed by: STUDENT IN AN ORGANIZED HEALTH CARE EDUCATION/TRAINING PROGRAM

## 2022-08-01 PROCEDURE — 3288F FALL RISK ASSESSMENT DOCD: CPT | Mod: CPTII,S$GLB,, | Performed by: STUDENT IN AN ORGANIZED HEALTH CARE EDUCATION/TRAINING PROGRAM

## 2022-08-01 PROCEDURE — 99999 PR PBB SHADOW E&M-EST. PATIENT-LVL IV: ICD-10-PCS | Mod: PBBFAC,,, | Performed by: STUDENT IN AN ORGANIZED HEALTH CARE EDUCATION/TRAINING PROGRAM

## 2022-08-01 PROCEDURE — 3008F BODY MASS INDEX DOCD: CPT | Mod: CPTII,S$GLB,, | Performed by: STUDENT IN AN ORGANIZED HEALTH CARE EDUCATION/TRAINING PROGRAM

## 2022-08-01 NOTE — PROGRESS NOTES
[facility] MEDICINE CLINIC NOTE    Patient Name: Sammy Muniz  YOB: 1950    PRESENTING HISTORY   Chief Complaint:   Chief Complaint   Patient presents with    Follow-up        History of Present Illness:  Mr. Sammy Muniz is a 71 y.o. male     No improvement. Still has intermittent bouts of generalized lower abdominal pain and low back pain after eating.     Otherwise no sig changes in health.   No change in weight.   Workup thus far has consisted on US abdomen, CT abdomen, MRI abdomen, EGD. Has been seeing Dr. Dixon.   Stool studies sig for mildly elevated calprotectin.                                              Review of Systems   All other systems reviewed and are negative.        PAST HISTORY:     Past Medical History:   Diagnosis Date    Anxiety and depression     Arthritis     b/l Knee and Shoulder replacements    BPH (benign prostatic hyperplasia)     Hyperlipidemia     Hypertension     Kidney stone     Sleep apnea     Doews not use C-pap       Past Surgical History:   Procedure Laterality Date    ARTHROPLASTY OF SHOULDER Left 9/24/2019    Procedure: ARTHROPLASTY, SHOULDER;  Surgeon: Abad Miller Jr., MD;  Location: [facility];  Service: Orthopedics;  Laterality: Left;  [employer] notified 9/20-tcb    CERVICAL FUSION      CYSTOSCOPY      GASTRIC BYPASS  2012    HERNIA REPAIR      ventral hernia    JOINT REPLACEMENT      Bilateral knees & Rt shoulder    PROSTATE SURGERY      greenlight laser    TONSILLECTOMY      TOTAL KNEE ARTHROPLASTY  left    TOTAL SHOULDER ARTHROPLASTY  right    TYMPANOPLASTY  left    VASECTOMY         Family History   Problem Relation Age of Onset    Benign prostatic hyperplasia Brother     Heart disease Mother     Kidney disease Mother     Arthritis Mother     Mental illness Father 84        dementia    Diabetes Father     Hypertension Father     Heart disease Father     Cancer Father 75        colon    Rectal cancer  "Brother     Prostate cancer Neg Hx     Urolithiasis Neg Hx     Kidney cancer Neg Hx        Social History     Socioeconomic History    Marital status:    Tobacco Use    Smoking status: Never Smoker    Smokeless tobacco: Never Used   Substance and Sexual Activity    Alcohol use: No    Drug use: No       MEDICATIONS & ALLERGIES:     Current Outpatient Medications on File Prior to Visit   Medication Sig    amlodipine-valsartan (EXFORGE) 5-320 mg per tablet TAKE 1 TABLET EVERY DAY    aspirin 325 MG tablet Take 1 tablet (325 mg total) by mouth once daily.    celecoxib (CELEBREX) 100 MG capsule TAKE 1 CAPSULE ONE TIME DAILY    citalopram (CELEXA) 20 MG tablet TAKE 1 TABLET ONE TIME DAILY    dicyclomine (BENTYL) 20 mg tablet dicyclomine 20 mg tablet   Take 1 tablet 4 times a day by oral route as needed for 30 days.    fish oil-dha-epa 1,200-144-216 mg Cap Take 1 capsule by mouth 2 (two) times daily.    labetaloL (NORMODYNE) 200 MG tablet TAKE 1 TABLET TWICE DAILY    pantoprazole (PROTONIX) 40 MG tablet TAKE 1 TABLET DAILY    potassium chloride SA (K-DUR,KLOR-CON) 20 MEQ tablet TAKE 1 TABLET TWICE DAILY    pravastatin (PRAVACHOL) 40 MG tablet TAKE 1 TABLET ONE TIME DAILY IN THE MORNING    spironolactone (ALDACTONE) 50 MG tablet TAKE 1 TABLET ONE TIME DAILY    tamsulosin (FLOMAX) 0.4 mg Cap TAKE 1 CAPSULE EVERY EVENING     No current facility-administered medications on file prior to visit.       Review of patient's allergies indicates:   Allergen Reactions    Iodinated contrast media Itching       OBJECTIVE:   Vital Signs:  Vitals:    08/01/22 1523   BP: 132/76   Pulse: 68   Resp: 18   SpO2: 95%   Weight: 118.4 kg (261 lb 0.4 oz)   Height: 5' 10" (1.778 m)       No results found for this or any previous visit (from the past 24 hour(s)).      Physical Exam  Vitals and nursing note reviewed.   Constitutional:       General: He is not in acute distress.     Appearance: He is not toxic-appearing or " diaphoretic.   HENT:      Head: Normocephalic and atraumatic.      Right Ear: External ear normal.      Left Ear: External ear normal.   Eyes:      General: No scleral icterus.     Conjunctiva/sclera: Conjunctivae normal.      Pupils: Pupils are equal, round, and reactive to light.   Neck:      Thyroid: No thyromegaly.   Cardiovascular:      Rate and Rhythm: Normal rate and regular rhythm.      Heart sounds: Normal heart sounds. No murmur heard.  Pulmonary:      Effort: Pulmonary effort is normal. No respiratory distress.      Breath sounds: Normal breath sounds. No wheezing or rales.   Abdominal:      General: Bowel sounds are normal.      Palpations: Abdomen is soft.      Tenderness: There is abdominal tenderness. There is no right CVA tenderness, guarding or rebound.   Musculoskeletal:         General: No tenderness or deformity. Normal range of motion.      Cervical back: Normal range of motion and neck supple.   Lymphadenopathy:      Cervical: No cervical adenopathy.   Skin:     General: Skin is warm and dry.      Findings: No erythema or rash.   Neurological:      Mental Status: He is alert and oriented to person, place, and time.      Gait: Gait is intact.   Psychiatric:         Mood and Affect: Mood and affect normal.         Cognition and Memory: Memory normal.         Judgment: Judgment normal.         ASSESSMENT & PLAN:     Generalized abdominal pain  -     CBC Auto Differential; Future; Expected date: 08/01/2022  -     Comprehensive Metabolic Panel; Future; Expected date: 08/01/2022  -     LIPASE; Future; Expected date: 08/01/2022  -     Clostridium difficile EIA; Future; Expected date: 08/01/2022    Abdominal pain, unspecified abdominal location  -     NM Hepatobiliary Scan (HIDA); Future; Expected date: 08/01/2022      I cannot find the etiology of his pain.   I think it is reasonable to seek General Surgery opinion.     Fab Tompkins MD   Internal Medicine    This note was created using Dragon voice  recognition software that occasionally misinterprets phrases or words.

## 2022-08-02 LAB
ALBUMIN SERPL BCP-MCNC: 4 G/DL (ref 3.5–5.2)
ALP SERPL-CCNC: 61 U/L (ref 55–135)
ALT SERPL W/O P-5'-P-CCNC: 32 U/L (ref 10–44)
ANION GAP SERPL CALC-SCNC: 12 MMOL/L (ref 8–16)
AST SERPL-CCNC: 20 U/L (ref 10–40)
BASOPHILS # BLD AUTO: 0.02 K/UL (ref 0–0.2)
BASOPHILS NFR BLD: 0.4 % (ref 0–1.9)
BILIRUB SERPL-MCNC: 0.3 MG/DL (ref 0.1–1)
BUN SERPL-MCNC: 17 MG/DL (ref 8–23)
CALCIUM SERPL-MCNC: 9.1 MG/DL (ref 8.7–10.5)
CHLORIDE SERPL-SCNC: 103 MMOL/L (ref 95–110)
CO2 SERPL-SCNC: 25 MMOL/L (ref 23–29)
CREAT SERPL-MCNC: 1.1 MG/DL (ref 0.5–1.4)
DIFFERENTIAL METHOD: ABNORMAL
EOSINOPHIL # BLD AUTO: 0.1 K/UL (ref 0–0.5)
EOSINOPHIL NFR BLD: 2.5 % (ref 0–8)
ERYTHROCYTE [DISTWIDTH] IN BLOOD BY AUTOMATED COUNT: 12.2 % (ref 11.5–14.5)
EST. GFR  (NO RACE VARIABLE): >60 ML/MIN/1.73 M^2
GLUCOSE SERPL-MCNC: 132 MG/DL (ref 70–110)
HCT VFR BLD AUTO: 39.5 % (ref 40–54)
HGB BLD-MCNC: 13.2 G/DL (ref 14–18)
IMM GRANULOCYTES # BLD AUTO: 0.02 K/UL (ref 0–0.04)
IMM GRANULOCYTES NFR BLD AUTO: 0.4 % (ref 0–0.5)
LIPASE SERPL-CCNC: 17 U/L (ref 4–60)
LYMPHOCYTES # BLD AUTO: 1.5 K/UL (ref 1–4.8)
LYMPHOCYTES NFR BLD: 28.9 % (ref 18–48)
MCH RBC QN AUTO: 30.9 PG (ref 27–31)
MCHC RBC AUTO-ENTMCNC: 33.4 G/DL (ref 32–36)
MCV RBC AUTO: 93 FL (ref 82–98)
MONOCYTES # BLD AUTO: 0.3 K/UL (ref 0.3–1)
MONOCYTES NFR BLD: 5.9 % (ref 4–15)
NEUTROPHILS # BLD AUTO: 3.2 K/UL (ref 1.8–7.7)
NEUTROPHILS NFR BLD: 61.9 % (ref 38–73)
NRBC BLD-RTO: 0 /100 WBC
PLATELET # BLD AUTO: 235 K/UL (ref 150–450)
PMV BLD AUTO: 10 FL (ref 9.2–12.9)
POTASSIUM SERPL-SCNC: 3.5 MMOL/L (ref 3.5–5.1)
PROT SERPL-MCNC: 6.9 G/DL (ref 6–8.4)
RBC # BLD AUTO: 4.27 M/UL (ref 4.6–6.2)
SODIUM SERPL-SCNC: 140 MMOL/L (ref 136–145)
WBC # BLD AUTO: 5.12 K/UL (ref 3.9–12.7)

## 2022-08-17 ENCOUNTER — TELEPHONE (OUTPATIENT)
Dept: FAMILY MEDICINE | Facility: CLINIC | Age: 72
End: 2022-08-17
Payer: MEDICARE

## 2022-08-17 ENCOUNTER — OFFICE VISIT (OUTPATIENT)
Dept: BARIATRICS | Facility: CLINIC | Age: 72
End: 2022-08-17
Payer: MEDICARE

## 2022-08-17 VITALS
DIASTOLIC BLOOD PRESSURE: 74 MMHG | SYSTOLIC BLOOD PRESSURE: 121 MMHG | BODY MASS INDEX: 37.62 KG/M2 | HEART RATE: 69 BPM | HEIGHT: 70 IN | OXYGEN SATURATION: 94 % | WEIGHT: 262.81 LBS

## 2022-08-17 DIAGNOSIS — R10.30 LOWER ABDOMINAL PAIN: Primary | ICD-10-CM

## 2022-08-17 DIAGNOSIS — Z90.3 HISTORY OF SLEEVE GASTRECTOMY: ICD-10-CM

## 2022-08-17 PROCEDURE — 1160F RVW MEDS BY RX/DR IN RCRD: CPT | Mod: CPTII,S$GLB,, | Performed by: SURGERY

## 2022-08-17 PROCEDURE — 3078F PR MOST RECENT DIASTOLIC BLOOD PRESSURE < 80 MM HG: ICD-10-PCS | Mod: CPTII,S$GLB,, | Performed by: SURGERY

## 2022-08-17 PROCEDURE — 3288F PR FALLS RISK ASSESSMENT DOCUMENTED: ICD-10-PCS | Mod: CPTII,S$GLB,, | Performed by: SURGERY

## 2022-08-17 PROCEDURE — 1126F AMNT PAIN NOTED NONE PRSNT: CPT | Mod: CPTII,S$GLB,, | Performed by: SURGERY

## 2022-08-17 PROCEDURE — 1101F PT FALLS ASSESS-DOCD LE1/YR: CPT | Mod: CPTII,S$GLB,, | Performed by: SURGERY

## 2022-08-17 PROCEDURE — 3008F PR BODY MASS INDEX (BMI) DOCUMENTED: ICD-10-PCS | Mod: CPTII,S$GLB,, | Performed by: SURGERY

## 2022-08-17 PROCEDURE — 3008F BODY MASS INDEX DOCD: CPT | Mod: CPTII,S$GLB,, | Performed by: SURGERY

## 2022-08-17 PROCEDURE — 1126F PR PAIN SEVERITY QUANTIFIED, NO PAIN PRESENT: ICD-10-PCS | Mod: CPTII,S$GLB,, | Performed by: SURGERY

## 2022-08-17 PROCEDURE — 99204 PR OFFICE/OUTPT VISIT, NEW, LEVL IV, 45-59 MIN: ICD-10-PCS | Mod: S$GLB,,, | Performed by: SURGERY

## 2022-08-17 PROCEDURE — 3074F SYST BP LT 130 MM HG: CPT | Mod: CPTII,S$GLB,, | Performed by: SURGERY

## 2022-08-17 PROCEDURE — 1101F PR PT FALLS ASSESS DOC 0-1 FALLS W/OUT INJ PAST YR: ICD-10-PCS | Mod: CPTII,S$GLB,, | Performed by: SURGERY

## 2022-08-17 PROCEDURE — 1159F PR MEDICATION LIST DOCUMENTED IN MEDICAL RECORD: ICD-10-PCS | Mod: CPTII,S$GLB,, | Performed by: SURGERY

## 2022-08-17 PROCEDURE — 4010F ACE/ARB THERAPY RXD/TAKEN: CPT | Mod: CPTII,S$GLB,, | Performed by: SURGERY

## 2022-08-17 PROCEDURE — 3074F PR MOST RECENT SYSTOLIC BLOOD PRESSURE < 130 MM HG: ICD-10-PCS | Mod: CPTII,S$GLB,, | Performed by: SURGERY

## 2022-08-17 PROCEDURE — 99999 PR PBB SHADOW E&M-EST. PATIENT-LVL III: CPT | Mod: PBBFAC,,, | Performed by: SURGERY

## 2022-08-17 PROCEDURE — 1159F MED LIST DOCD IN RCRD: CPT | Mod: CPTII,S$GLB,, | Performed by: SURGERY

## 2022-08-17 PROCEDURE — 4010F PR ACE/ARB THEARPY RXD/TAKEN: ICD-10-PCS | Mod: CPTII,S$GLB,, | Performed by: SURGERY

## 2022-08-17 PROCEDURE — 1157F PR ADVANCE CARE PLAN OR EQUIV PRESENT IN MEDICAL RECORD: ICD-10-PCS | Mod: CPTII,S$GLB,, | Performed by: SURGERY

## 2022-08-17 PROCEDURE — 3288F FALL RISK ASSESSMENT DOCD: CPT | Mod: CPTII,S$GLB,, | Performed by: SURGERY

## 2022-08-17 PROCEDURE — 3078F DIAST BP <80 MM HG: CPT | Mod: CPTII,S$GLB,, | Performed by: SURGERY

## 2022-08-17 PROCEDURE — 99999 PR PBB SHADOW E&M-EST. PATIENT-LVL III: ICD-10-PCS | Mod: PBBFAC,,, | Performed by: SURGERY

## 2022-08-17 PROCEDURE — 1160F PR REVIEW ALL MEDS BY PRESCRIBER/CLIN PHARMACIST DOCUMENTED: ICD-10-PCS | Mod: CPTII,S$GLB,, | Performed by: SURGERY

## 2022-08-17 PROCEDURE — 1157F ADVNC CARE PLAN IN RCRD: CPT | Mod: CPTII,S$GLB,, | Performed by: SURGERY

## 2022-08-17 PROCEDURE — 99204 OFFICE O/P NEW MOD 45 MIN: CPT | Mod: S$GLB,,, | Performed by: SURGERY

## 2022-08-17 NOTE — PATIENT INSTRUCTIONS
Faxed PRE[facility] to Dr Stas Dixon requesting EGD report and color copies.  Scheduled an appointment in the pancreatic clinic for 8/30/2022 at 2:20 PM and notified patient of date and time.  He agreed that it was a good date and time.  Will review his HIDA on 9/9/2022 and get back to him with recommendations for a  plan of care.

## 2022-08-17 NOTE — PROGRESS NOTES
History & Physical    SUBJECTIVE:     History of Present Illness:  Patient is a 71 y.o. male presents with abdominal pain.  Sleeve 8 years ago.  Pain is low and he states then radiates around to both sides of his back.  The back pain last for 16-18 hours.  He states he feels it is after meals, but only about 20% of the time.  No association with any particular food ot food type.  Sometimes associated with diarrhea.  No fevers/chills.  No nausea.  Pt states he recently had an EGD which was noted to be WNL s/p sleeve.      Review of patient's allergies indicates:   Allergen Reactions    Iodinated contrast media Itching       Current Outpatient Medications   Medication Sig Dispense Refill    amlodipine-valsartan (EXFORGE) 5-320 mg per tablet TAKE 1 TABLET EVERY DAY 90 tablet 3    aspirin 325 MG tablet Take 1 tablet (325 mg total) by mouth once daily.  0    celecoxib (CELEBREX) 100 MG capsule TAKE 1 CAPSULE ONE TIME DAILY 90 capsule 3    citalopram (CELEXA) 20 MG tablet TAKE 1 TABLET ONE TIME DAILY 90 tablet 3    dicyclomine (BENTYL) 20 mg tablet dicyclomine 20 mg tablet   Take 1 tablet 4 times a day by oral route as needed for 30 days.      fish oil-dha-epa 1,200-144-216 mg Cap Take 1 capsule by mouth 2 (two) times daily.      labetaloL (NORMODYNE) 200 MG tablet TAKE 1 TABLET TWICE DAILY 180 tablet 3    pantoprazole (PROTONIX) 40 MG tablet TAKE 1 TABLET DAILY 90 tablet 3    potassium chloride SA (K-DUR,KLOR-CON) 20 MEQ tablet TAKE 1 TABLET TWICE DAILY 180 tablet 3    pravastatin (PRAVACHOL) 40 MG tablet TAKE 1 TABLET ONE TIME DAILY IN THE MORNING 90 tablet 3    spironolactone (ALDACTONE) 50 MG tablet TAKE 1 TABLET ONE TIME DAILY 90 tablet 3    tamsulosin (FLOMAX) 0.4 mg Cap TAKE 1 CAPSULE EVERY EVENING 90 capsule 3     No current facility-administered medications for this visit.       Past Medical History:   Diagnosis Date    Anxiety and depression     Arthritis     b/l Knee and Shoulder replacements     BPH (benign prostatic hyperplasia)     Hyperlipidemia     Hypertension     Kidney stone     Sleep apnea     Doews not use C-pap     Past Surgical History:   Procedure Laterality Date    ARTHROPLASTY OF SHOULDER Left 9/24/2019    Procedure: ARTHROPLASTY, SHOULDER;  Surgeon: Abad Miller Jr., MD;  Location: [facility];  Service: Orthopedics;  Laterality: Left;  [employer] Lopes notified 9/20-tcb    CERVICAL FUSION      CYSTOSCOPY      GASTRIC BYPASS  2012    HERNIA REPAIR      ventral hernia    JOINT REPLACEMENT      Bilateral knees & Rt shoulder    PROSTATE SURGERY      greenlight laser    TONSILLECTOMY      TOTAL KNEE ARTHROPLASTY  left    TOTAL SHOULDER ARTHROPLASTY  right    TYMPANOPLASTY  left    VASECTOMY       Family History   Problem Relation Age of Onset    Benign prostatic hyperplasia Brother     Heart disease Mother     Kidney disease Mother     Arthritis Mother     Mental illness Father 84        dementia    Diabetes Father     Hypertension Father     Heart disease Father     Cancer Father 75        colon    Rectal cancer Brother     Prostate cancer Neg Hx     Urolithiasis Neg Hx     Kidney cancer Neg Hx      Social History     Tobacco Use    Smoking status: Never Smoker    Smokeless tobacco: Never Used   Substance Use Topics    Alcohol use: No    Drug use: No        Review of Systems:  Review of Systems   Constitutional: Negative for activity change, appetite change, chills, diaphoresis, fatigue and fever.   HENT: Negative for congestion, sinus pressure, sneezing and sore throat.    Eyes: Negative for pain, discharge, redness, itching and visual disturbance.   Respiratory: Negative for apnea, cough, choking, chest tightness and shortness of breath.    Cardiovascular: Negative for chest pain and palpitations.   Gastrointestinal: Positive for abdominal pain and diarrhea. Negative for abdominal distention, constipation, nausea and vomiting.   Musculoskeletal: Positive  "for back pain. Negative for arthralgias and gait problem.   Skin: Negative for color change, pallor and rash.   Neurological: Negative for dizziness, facial asymmetry, light-headedness and headaches.   Psychiatric/Behavioral: Negative for agitation, behavioral problems and confusion.       OBJECTIVE:     Vital Signs (Most Recent)  Pulse: 69 (08/17/22 1326)  BP: 121/74 (08/17/22 1326)  SpO2: (!) 94 % (08/17/22 1326)  5' 10" (1.778 m)  119.2 kg (262 lb 12.6 oz)     Physical Exam:  Physical Exam  Constitutional:       General: He is not in acute distress.     Appearance: Normal appearance. He is not diaphoretic.   HENT:      Head: Normocephalic and atraumatic.      Right Ear: External ear normal.      Left Ear: External ear normal.   Eyes:      General: No scleral icterus.        Right eye: No discharge.         Left eye: No discharge.   Cardiovascular:      Rate and Rhythm: Normal rate and regular rhythm.   Pulmonary:      Effort: Pulmonary effort is normal. No respiratory distress.   Abdominal:      General: There is no distension.      Palpations: Abdomen is soft. There is no mass.      Tenderness: There is no abdominal tenderness. There is no guarding or rebound.      Hernia: No hernia is present.   Musculoskeletal:         General: Normal range of motion.   Skin:     General: Skin is warm and dry.      Coloration: Skin is not pale.      Findings: No erythema or rash.   Neurological:      Mental Status: He is alert and oriented to person, place, and time.   Psychiatric:         Mood and Affect: Mood normal.         Behavior: Behavior normal.         Thought Content: Thought content normal.         Judgment: Judgment normal.         ASSESSMENT/PLAN:   Abdominal Pain    PLAN:Plan     Not felt related to sleeve.  Will FU HIDA scan results ordered  Will obtain recent report for EGD  Will refer to pancreatic cyst clinic  If no surgical issue noted could consider an UGI to further eval sleeve but it does not seem to be " post surgical related.         Abad Reddy MD

## 2022-08-17 NOTE — TELEPHONE ENCOUNTER
Noted. Placed call to pt to notify. Unable to reach, Left voicemail for patient to call the office back.      ----- Message from Verna Phillips sent at 8/17/2022  4:12 AM CDT -----  Regarding: Lab Client Services  Contact: [phone]  Good Morning,    My name is Verna Phillips I work in the lab. We received a specimen for Clostridium Difficile test. The test was unable to be performed due to the stool sample was formed stool. If you have any questions regarding this, please contact Lab Client Services at [phone].     Thank you

## 2022-08-23 ENCOUNTER — OFFICE VISIT (OUTPATIENT)
Dept: CARDIOLOGY | Facility: CLINIC | Age: 72
End: 2022-08-23
Payer: MEDICARE

## 2022-08-23 VITALS
DIASTOLIC BLOOD PRESSURE: 76 MMHG | HEIGHT: 70 IN | HEART RATE: 69 BPM | SYSTOLIC BLOOD PRESSURE: 118 MMHG | OXYGEN SATURATION: 95 % | BODY MASS INDEX: 37.51 KG/M2 | WEIGHT: 262 LBS

## 2022-08-23 DIAGNOSIS — E78.00 PURE HYPERCHOLESTEROLEMIA: ICD-10-CM

## 2022-08-23 DIAGNOSIS — R10.13 EPIGASTRIC PAIN: ICD-10-CM

## 2022-08-23 DIAGNOSIS — I10 ESSENTIAL HYPERTENSION: Primary | ICD-10-CM

## 2022-08-23 DIAGNOSIS — G47.30 SLEEP APNEA, UNSPECIFIED TYPE: ICD-10-CM

## 2022-08-23 PROBLEM — D64.9 ANEMIA: Status: ACTIVE | Noted: 2022-08-23

## 2022-08-23 PROCEDURE — 1159F PR MEDICATION LIST DOCUMENTED IN MEDICAL RECORD: ICD-10-PCS | Mod: CPTII,S$GLB,, | Performed by: INTERNAL MEDICINE

## 2022-08-23 PROCEDURE — 1157F ADVNC CARE PLAN IN RCRD: CPT | Mod: CPTII,S$GLB,, | Performed by: INTERNAL MEDICINE

## 2022-08-23 PROCEDURE — 93000 ELECTROCARDIOGRAM COMPLETE: CPT | Mod: S$GLB,,, | Performed by: INTERNAL MEDICINE

## 2022-08-23 PROCEDURE — 4010F PR ACE/ARB THEARPY RXD/TAKEN: ICD-10-PCS | Mod: CPTII,S$GLB,, | Performed by: INTERNAL MEDICINE

## 2022-08-23 PROCEDURE — 1126F AMNT PAIN NOTED NONE PRSNT: CPT | Mod: CPTII,S$GLB,, | Performed by: INTERNAL MEDICINE

## 2022-08-23 PROCEDURE — 1126F PR PAIN SEVERITY QUANTIFIED, NO PAIN PRESENT: ICD-10-PCS | Mod: CPTII,S$GLB,, | Performed by: INTERNAL MEDICINE

## 2022-08-23 PROCEDURE — 1101F PT FALLS ASSESS-DOCD LE1/YR: CPT | Mod: CPTII,S$GLB,, | Performed by: INTERNAL MEDICINE

## 2022-08-23 PROCEDURE — 99214 OFFICE O/P EST MOD 30 MIN: CPT | Mod: S$GLB,,, | Performed by: INTERNAL MEDICINE

## 2022-08-23 PROCEDURE — 1159F MED LIST DOCD IN RCRD: CPT | Mod: CPTII,S$GLB,, | Performed by: INTERNAL MEDICINE

## 2022-08-23 PROCEDURE — 3008F BODY MASS INDEX DOCD: CPT | Mod: CPTII,S$GLB,, | Performed by: INTERNAL MEDICINE

## 2022-08-23 PROCEDURE — 1101F PR PT FALLS ASSESS DOC 0-1 FALLS W/OUT INJ PAST YR: ICD-10-PCS | Mod: CPTII,S$GLB,, | Performed by: INTERNAL MEDICINE

## 2022-08-23 PROCEDURE — 99214 PR OFFICE/OUTPT VISIT, EST, LEVL IV, 30-39 MIN: ICD-10-PCS | Mod: S$GLB,,, | Performed by: INTERNAL MEDICINE

## 2022-08-23 PROCEDURE — 3078F PR MOST RECENT DIASTOLIC BLOOD PRESSURE < 80 MM HG: ICD-10-PCS | Mod: CPTII,S$GLB,, | Performed by: INTERNAL MEDICINE

## 2022-08-23 PROCEDURE — 3074F SYST BP LT 130 MM HG: CPT | Mod: CPTII,S$GLB,, | Performed by: INTERNAL MEDICINE

## 2022-08-23 PROCEDURE — 3288F FALL RISK ASSESSMENT DOCD: CPT | Mod: CPTII,S$GLB,, | Performed by: INTERNAL MEDICINE

## 2022-08-23 PROCEDURE — 3078F DIAST BP <80 MM HG: CPT | Mod: CPTII,S$GLB,, | Performed by: INTERNAL MEDICINE

## 2022-08-23 PROCEDURE — 3074F PR MOST RECENT SYSTOLIC BLOOD PRESSURE < 130 MM HG: ICD-10-PCS | Mod: CPTII,S$GLB,, | Performed by: INTERNAL MEDICINE

## 2022-08-23 PROCEDURE — 1157F PR ADVANCE CARE PLAN OR EQUIV PRESENT IN MEDICAL RECORD: ICD-10-PCS | Mod: CPTII,S$GLB,, | Performed by: INTERNAL MEDICINE

## 2022-08-23 PROCEDURE — 3288F PR FALLS RISK ASSESSMENT DOCUMENTED: ICD-10-PCS | Mod: CPTII,S$GLB,, | Performed by: INTERNAL MEDICINE

## 2022-08-23 PROCEDURE — 3008F PR BODY MASS INDEX (BMI) DOCUMENTED: ICD-10-PCS | Mod: CPTII,S$GLB,, | Performed by: INTERNAL MEDICINE

## 2022-08-23 PROCEDURE — 93000 EKG 12-LEAD: ICD-10-PCS | Mod: S$GLB,,, | Performed by: INTERNAL MEDICINE

## 2022-08-23 PROCEDURE — 4010F ACE/ARB THERAPY RXD/TAKEN: CPT | Mod: CPTII,S$GLB,, | Performed by: INTERNAL MEDICINE

## 2022-08-23 NOTE — ASSESSMENT & PLAN NOTE
This appears to be unclear etiology.  And this is being monitored by gastroenterologist as well as primary care no further intervention is needed at this time he is on Celebrex 100 mg daily watch for are any further evidence of GI losses.

## 2022-08-23 NOTE — ASSESSMENT & PLAN NOTE
Workup has been in progress.  He is also being followed by a GI had EGD completed and no pathology identified further evaluation to be done per primary care and bariatric surgeon.  In the meanwhile recommend to stop the 325 mg aspirin after 2 weeks start on baby aspirin 81 mg a day.

## 2022-08-23 NOTE — ASSESSMENT & PLAN NOTE
Arm blood pressure is stable at 1 18/76 mmHg.  Continue on amlodipine valsartan 5/320 mg once daily.  Is also on labetalol 200 mg a day continue the same along with Aldactone 50 mg daily

## 2022-08-23 NOTE — PROGRESS NOTES
Subjective:    Patient ID:  Sammy Muniz is a 71 y.o. male patient here for evaluation Follow-up (6 months . Patient is having back and stomach pain while eating . Dr Fab Tompkins MD is doing tests . /) and Hypertension      History of Present Illness:  Is a 71-year-old gentleman who has history of arterial hypertension 6 have follow-up evaluation.  He is doing well from cardiac standpoint no occurrence of angina PND orthopnea noted.  However he has been having some intermittent episodes of abdominal pain about 15 minutes to 1 hour after arm meal he begins to have discomfort in his upper abdomen has a bandlike sensation sometimes the persisted to have back pain more longer duration sometimes all evening are all day.    He had a general workup including an EGD done by Dr. Peters apparently no pathology was identified he has history of gastric sleeve and followed up with the surgeon and further reviews in progress at this time he also had a CT scan that did not demonstrate any obvious pathology other than some incidental findings.    Patient has no cough or congestion no fevers chills no nausea vomiting denies having any blood in the stools or black stools and no vomiting noted.   Sometimes he has diarrhea.      08/23/2022 EKG shows normal sinus rhythm and is within normal limits      Review of patient's allergies indicates:   Allergen Reactions    Iodinated contrast media Itching       Past Medical History:   Diagnosis Date    Anxiety and depression     Arthritis     b/l Knee and Shoulder replacements    BPH (benign prostatic hyperplasia)     Hyperlipidemia     Hypertension     Kidney stone     Sleep apnea     Doews not use C-pap     Past Surgical History:   Procedure Laterality Date    ARTHROPLASTY OF SHOULDER Left 9/24/2019    Procedure: ARTHROPLASTY, SHOULDER;  Surgeon: Abad Miller Jr., MD;  Location: [facility];  Service: Orthopedics;  Laterality: Left;  [employer] Lopes notified 9/20-tcb     CERVICAL FUSION      CYSTOSCOPY      GASTRIC BYPASS  2012    HERNIA REPAIR      ventral hernia    JOINT REPLACEMENT      Bilateral knees & Rt shoulder    PROSTATE SURGERY      greenlight laser    TONSILLECTOMY      TOTAL KNEE ARTHROPLASTY  left    TOTAL SHOULDER ARTHROPLASTY  right    TYMPANOPLASTY  left    VASECTOMY       Social History     Tobacco Use    Smoking status: Never Smoker    Smokeless tobacco: Never Used   Substance Use Topics    Alcohol use: No    Drug use: No        Review of Systems:    As noted in HPI in addition      REVIEW OF SYSTEMS  CARDIOVASCULAR: No recent chest pain, palpitations, arm, neck, or jaw pain  RESPIRATORY: No recent fever, cough chills, SOB or congestion  : No blood in the urine  GI: No Nausea, vomiting, occasional diarrhea, blood, or reflux.  MUSCULOSKELETAL: No myalgias  NEURO: No lightheadedness or dizziness  EYES: No Double vision, blurry, vision or headache              Objective        Vitals:    08/23/22 1101   BP: 118/76   Pulse: 69       LIPIDS - LAST 2   Lab Results   Component Value Date    CHOL 163 07/22/2021    CHOL 164 08/26/2020    HDL 43 07/22/2021    HDL 41 08/26/2020    LDLCALC 92 07/22/2021    LDLCALC 94 08/26/2020    TRIG 181 (H) 07/22/2021    TRIG 191 08/26/2020    CHOLHDL 3.8 07/22/2021    CHOLHDL 24.5 06/06/2012       CBC - LAST 2  Lab Results   Component Value Date    WBC 5.12 08/01/2022    WBC 4.5 12/17/2021    RBC 4.27 (L) 08/01/2022    RBC 4.70 12/17/2021    HGB 13.2 (L) 08/01/2022    HGB 14.4 12/17/2021    HCT 39.5 (L) 08/01/2022    HCT 42.7 12/17/2021    MCV 93 08/01/2022    MCV 90.9 12/17/2021    MCH 30.9 08/01/2022    MCH 30.6 12/17/2021    MCHC 33.4 08/01/2022    MCHC 33.7 12/17/2021    RDW 12.2 08/01/2022    RDW 11.8 12/17/2021     08/01/2022     12/17/2021    MPV 10.0 08/01/2022    MPV 10.3 12/17/2021    GRAN 3.2 08/01/2022    GRAN 61.9 08/01/2022    LYMPH 1.5 08/01/2022    LYMPH 28.9 08/01/2022    MONO 0.3  08/01/2022    MONO 5.9 08/01/2022    BASO 0.02 08/01/2022    BASO 41 12/17/2021    NRBC 0 08/01/2022    NRBC 0 09/25/2019       CHEMISTRY & LIVER FUNCTION - LAST 2  Lab Results   Component Value Date     08/01/2022     12/17/2021    K 3.5 08/01/2022    K 4.3 12/17/2021     08/01/2022     12/17/2021    CO2 25 08/01/2022    CO2 26 12/17/2021    ANIONGAP 12 08/01/2022    ANIONGAP 10 12/24/2020    BUN 17 08/01/2022    BUN 19 12/17/2021    CREATININE 1.1 08/01/2022    CREATININE 0.80 12/17/2021     (H) 08/01/2022     (H) 12/17/2021    CALCIUM 9.1 08/01/2022    CALCIUM 9.2 12/17/2021    MG 2.2 11/18/2011    MG 1.8 08/17/2011    ALBUMIN 4.0 08/01/2022    ALBUMIN 4.7 12/17/2021    PROT 6.9 08/01/2022    PROT 7.0 12/17/2021    ALKPHOS 61 08/01/2022    ALKPHOS 60 09/10/2019    ALT 32 08/01/2022    ALT 35 12/17/2021    AST 20 08/01/2022    AST 24 12/17/2021    BILITOT 0.3 08/01/2022    BILITOT 0.6 12/17/2021        CARDIAC PROFILE - LAST 2  Lab Results   Component Value Date    BNP 51 12/24/2020        COAGULATION - LAST 2  Lab Results   Component Value Date    INR 1.05 01/31/2012    APTT 29.6 01/31/2012       ENDOCRINE & PSA - LAST 2  Lab Results   Component Value Date    HGBA1C 5.6 08/17/2011    TSH 2.55 07/22/2021    TSH 1.86 08/17/2011    PSA 0.32 01/08/2013        ECHOCARDIOGRAM RESULTS  Results for orders placed during the hospital encounter of 05/26/21    Echo Color Flow Doppler? Yes    Interpretation Summary  · The left ventricle is normal in size with moderate concentric hypertrophy and normal systolic function.  · The estimated ejection fraction is 65%.  · Normal left ventricular diastolic function.  · Normal right ventricular size with normal right ventricular systolic function.  · Mild left atrial enlargement.  · Mild tricuspid regurgitation.  · Normal central venous pressure (3 mmHg).  · The estimated PA systolic pressure is 27 mmHg.      CURRENT/PREVIOUS VISIT EKG  Results  for orders placed or performed in visit on 05/10/21   IN OFFICE EKG 12-LEAD (to [place])    Collection Time: 05/10/21  2:58 PM    Narrative    Test Reason : I10,R06.02,    Vent. Rate : 059 BPM     Atrial Rate : 059 BPM     P-R Int : 168 ms          QRS Dur : 098 ms      QT Int : 468 ms       P-R-T Axes : 034 000 047 degrees     QTc Int : 463 ms    Sinus bradycardia  Otherwise normal ECG  When compared with ECG of 24-DEC-2020 10:33,  No significant change was found  Confirmed by Amador Mirza MD ([id number]) on 5/13/2021 9:20:08 PM    Referred By:  VB           Confirmed By:Amador Mirza MD     No valid procedures specified.   Results for orders placed during the hospital encounter of 05/26/21    Nuclear Stress - Cardiology Interpreted    Interpretation Summary    Abnormal myocardial perfusion scan.    There is a moderate to severe intensity, moderate sized, equivocal defect that is fixed in the basal to apical inferior wall(s). This finding is equivocal due to diaphragm shadow.    There is a  moderate to severe intensity fixed defect in the inferior wall of the left ventricle secondary to diaphragm attenuation.    The gated perfusion images showed an ejection fraction of 60% post stress. Normal ejection fraction is greater than 53%.    There is normal wall motion post stress.    LV cavity size is  and normal at stress.    The EKG portion of this study is negative for ischemia.    The patient reported no chest pain during the stress test.    There were no arrhythmias during stress.    No valid procedures specified.    PHYSICAL EXAM  CONSTITUTIONAL: Well built, well nourished in no apparent distress  NECK: no carotid bruit, no JVD  LUNGS: CTA  CHEST WALL: no tenderness  HEART: regular rate and rhythm, S1, S2 normal, no murmur, click, rub or gallop   ABDOMEN: soft, non-tender; bowel sounds normal; no masses,  no organomegaly  EXTREMITIES: Extremities normal, no edema, no calf tenderness noted  NEURO: AAO X 3    I  HAVE REVIEWED :    The vital signs, nurses notes, and all the pertinent radiology and labs.        Current Outpatient Medications   Medication Instructions    amlodipine-valsartan (EXFORGE) 5-320 mg per tablet TAKE 1 TABLET EVERY DAY    aspirin 325 mg, Oral, Daily    celecoxib (CELEBREX) 100 MG capsule TAKE 1 CAPSULE ONE TIME DAILY    citalopram (CELEXA) 20 MG tablet TAKE 1 TABLET ONE TIME DAILY    fish oil-dha-epa 1,200-144-216 mg Cap 1 capsule, Oral, 2 times daily,      labetaloL (NORMODYNE) 200 MG tablet TAKE 1 TABLET TWICE DAILY    pantoprazole (PROTONIX) 40 MG tablet TAKE 1 TABLET DAILY    potassium chloride SA (K-DUR,KLOR-CON) 20 MEQ tablet TAKE 1 TABLET TWICE DAILY    pravastatin (PRAVACHOL) 40 MG tablet TAKE 1 TABLET ONE TIME DAILY IN THE MORNING    spironolactone (ALDACTONE) 50 MG tablet TAKE 1 TABLET ONE TIME DAILY    tamsulosin (FLOMAX) 0.4 mg Cap TAKE 1 CAPSULE EVERY EVENING          Assessment & Plan     Essential hypertension  Arm blood pressure is stable at 1 18/76 mmHg.  Continue on amlodipine valsartan 5/320 mg once daily.  Is also on labetalol 200 mg a day continue the same along with Aldactone 50 mg daily    Hyperlipidemia  Continue on pravastatin 40 mg at bedtime maintain low-fat low-cholesterol diet    Sleep apnea  Is not been using his sleep assist device    Epigastric pain  Workup has been in progress.  He is also being followed by a GI had EGD completed and no pathology identified further evaluation to be done per primary care and bariatric surgeon.  In the meanwhile recommend to stop the 325 mg aspirin after 2 weeks start on baby aspirin 81 mg a day.    Anemia  This appears to be unclear etiology.  And this is being monitored by gastroenterologist as well as primary care no further intervention is needed at this time he is on Celebrex 100 mg daily watch for are any further evidence of GI losses.          No follow-ups on file.

## 2022-09-08 ENCOUNTER — HOSPITAL ENCOUNTER (OUTPATIENT)
Dept: RADIOLOGY | Facility: HOSPITAL | Age: 72
Discharge: HOME | End: 2022-09-08
Attending: STUDENT IN AN ORGANIZED HEALTH CARE EDUCATION/TRAINING PROGRAM
Payer: MEDICARE

## 2022-09-08 ENCOUNTER — TELEPHONE (OUTPATIENT)
Dept: FAMILY MEDICINE | Facility: CLINIC | Age: 72
End: 2022-09-08
Payer: MEDICARE

## 2022-09-08 DIAGNOSIS — R10.9 ABDOMINAL PAIN, UNSPECIFIED ABDOMINAL LOCATION: ICD-10-CM

## 2022-09-08 PROCEDURE — A9537 TC99M MEBROFENIN: HCPCS

## 2022-09-08 PROCEDURE — 78226 HEPATOBILIARY SYSTEM IMAGING: CPT | Mod: TC

## 2022-09-08 NOTE — TELEPHONE ENCOUNTER
I spoke with pt via phone, I advised him that Dr. Tompkins is gone for the day so the imagining will not be resulted today. I advised him that once Dr. Tompkins results the test he will receive a call from the office or an e-mail with Dr. Tompkins. All understanding voiced.  He states that he just wants to know what to do next as he is still having the same pain.    ----- Message from Jaiden Jones sent at 9/8/2022  2:17 PM CDT -----  Type: Needs Medical Advice  Who Called:  Patient    Best Call Back Number: [phone]  Additional Information: Patient states that he would like a callback from [facility] regarding his recent test results, his pain and any future steps.

## 2022-09-09 ENCOUNTER — TELEPHONE (OUTPATIENT)
Dept: FAMILY MEDICINE | Facility: CLINIC | Age: 72
End: 2022-09-09
Payer: MEDICARE

## 2022-09-09 NOTE — TELEPHONE ENCOUNTER
"I spoke with pt via phone. Phone call transferred from phone room. I advised him that per Dr. Tompkins " Gallbladder is normal." He states that he is still having the same pain. Will f/u with his surgeon and Dr. Barnesor.   "

## 2022-09-16 ENCOUNTER — TELEPHONE (OUTPATIENT)
Dept: FAMILY MEDICINE | Facility: CLINIC | Age: 72
End: 2022-09-16
Payer: MEDICARE

## 2022-09-16 ENCOUNTER — PATIENT MESSAGE (OUTPATIENT)
Dept: FAMILY MEDICINE | Facility: CLINIC | Age: 72
End: 2022-09-16
Payer: MEDICARE

## 2022-10-08 NOTE — PROGRESS NOTES
Patient, Sammy Muniz (MRN #[id number]), presented with a recorded BMI of 37.59 kg/m^2 and a documented comorbidity(s):  - Hypertension  - Hyperlipidemia  to which the severe obesity is a contributing factor. This is consistent with the definition of severe obesity (BMI 35.0-39.9) with comorbidity (ICD-10 E66.01, Z68.35). The patient's severe obesity was monitored, evaluated, addressed and/or treated. This addendum to the medical record is made on 10/07/2022.

## 2022-11-15 PROBLEM — K80.50 BILIARY COLIC: Status: ACTIVE | Noted: 2022-11-15

## 2023-02-07 DIAGNOSIS — Z00.00 ENCOUNTER FOR MEDICARE ANNUAL WELLNESS EXAM: ICD-10-CM

## 2023-02-09 DIAGNOSIS — Z00.00 ENCOUNTER FOR MEDICARE ANNUAL WELLNESS EXAM: ICD-10-CM

## 2023-03-29 DIAGNOSIS — N40.1 BENIGN PROSTATIC HYPERPLASIA WITH URINARY OBSTRUCTION: ICD-10-CM

## 2023-03-29 DIAGNOSIS — N13.8 BENIGN PROSTATIC HYPERPLASIA WITH URINARY OBSTRUCTION: ICD-10-CM

## 2023-03-30 ENCOUNTER — TELEPHONE (OUTPATIENT)
Dept: FAMILY MEDICINE | Facility: CLINIC | Age: 73
End: 2023-03-30
Payer: MEDICARE

## 2023-03-30 RX ORDER — TAMSULOSIN HYDROCHLORIDE 0.4 MG/1
1 CAPSULE ORAL NIGHTLY
Qty: 90 CAPSULE | Refills: 1 | Status: SHIPPED | OUTPATIENT
Start: 2023-03-30 | End: 2023-05-04

## 2023-03-30 NOTE — TELEPHONE ENCOUNTER
Refill Decision Note   Sammy Mcgrawgomez  is requesting a refill authorization.  Brief Assessment and Rationale for Refill:  Approve     Medication Therapy Plan:       Medication Reconciliation Completed: No   Comments:     No Care Gaps recommended.     Note composed:9:02 AM 03/30/2023

## 2023-03-30 NOTE — TELEPHONE ENCOUNTER
No new care gaps identified.  [facility] Embedded Care Gaps. Reference number: [id number]. 3/30/2023   8:38:55 AM CDT

## 2023-03-30 NOTE — TELEPHONE ENCOUNTER
[facility] Staff  Caller: [phone] (Today, 12:46 PM)    ----- Message from Trinity Blackwood sent at 3/30/2023 12:46 PM CDT -----  Contact: [phone]  Type:  Patient Returning Call    Who Called:  Pt   Who Left Message for Patient:  NA   Does the patient know what this is regarding?:  Not sure thinks about medication   Best Call Back Number:  [phone]

## 2023-03-30 NOTE — TELEPHONE ENCOUNTER
Refill Routing Note   Medication(s) are not appropriate for processing by [facility] for the following reason(s):       Drug-drug interaction    [facility] action(s):  Defer      Medication Therapy Plan: Duplicate Therapy: labetaloL, tamsulosin    Appointments  past 12m or future 3m with PCP    Date Provider   Last Visit   8/1/2022 Fab Tompkins MD   Next Visit   3/29/2023 Fab Tompkins MD   ED visits in past 90 days: 0        Note composed:8:53 AM 03/30/2023

## 2023-05-04 ENCOUNTER — OFFICE VISIT (OUTPATIENT)
Dept: UROLOGY | Facility: CLINIC | Age: 73
End: 2023-05-04
Payer: MEDICARE

## 2023-05-04 ENCOUNTER — LAB VISIT (OUTPATIENT)
Dept: LAB | Facility: HOSPITAL | Age: 73
End: 2023-05-04
Attending: NURSE PRACTITIONER
Payer: MEDICARE

## 2023-05-04 VITALS
HEART RATE: 74 BPM | WEIGHT: 259.94 LBS | HEIGHT: 70 IN | DIASTOLIC BLOOD PRESSURE: 86 MMHG | SYSTOLIC BLOOD PRESSURE: 151 MMHG | BODY MASS INDEX: 37.21 KG/M2

## 2023-05-04 DIAGNOSIS — N40.1 BENIGN PROSTATIC HYPERPLASIA WITH URINARY OBSTRUCTION: ICD-10-CM

## 2023-05-04 DIAGNOSIS — N13.8 BENIGN PROSTATIC HYPERPLASIA WITH URINARY OBSTRUCTION: ICD-10-CM

## 2023-05-04 DIAGNOSIS — N40.1 BENIGN PROSTATIC HYPERPLASIA WITH URINARY OBSTRUCTION: Primary | ICD-10-CM

## 2023-05-04 DIAGNOSIS — N13.8 BENIGN PROSTATIC HYPERPLASIA WITH URINARY OBSTRUCTION: Primary | ICD-10-CM

## 2023-05-04 LAB
BILIRUBIN, UA POC OHS: ABNORMAL
BLOOD, UA POC OHS: NEGATIVE
CLARITY, UA POC OHS: CLEAR
COLOR, UA POC OHS: YELLOW
GLUCOSE, UA POC OHS: NEGATIVE
KETONES, UA POC OHS: ABNORMAL
LEUKOCYTES, UA POC OHS: NEGATIVE
NITRITE, UA POC OHS: NEGATIVE
PH, UA POC OHS: 5.5
PROSTATE SPECIFIC ANTIGEN, TOTAL: 0.32 NG/ML (ref 0–4)
PROTEIN, UA POC OHS: 30
PSA FREE MFR SERPL: 37.5 %
PSA FREE SERPL-MCNC: 0.12 NG/ML (ref 0–1.5)
SPECIFIC GRAVITY, UA POC OHS: 1.02
UROBILINOGEN, UA POC OHS: 0.2

## 2023-05-04 PROCEDURE — 99204 OFFICE O/P NEW MOD 45 MIN: CPT | Mod: S$GLB,,, | Performed by: NURSE PRACTITIONER

## 2023-05-04 PROCEDURE — 1101F PT FALLS ASSESS-DOCD LE1/YR: CPT | Mod: CPTII,S$GLB,, | Performed by: NURSE PRACTITIONER

## 2023-05-04 PROCEDURE — 81003 URINALYSIS AUTO W/O SCOPE: CPT | Mod: QW,S$GLB,, | Performed by: NURSE PRACTITIONER

## 2023-05-04 PROCEDURE — 1157F PR ADVANCE CARE PLAN OR EQUIV PRESENT IN MEDICAL RECORD: ICD-10-PCS | Mod: CPTII,S$GLB,, | Performed by: NURSE PRACTITIONER

## 2023-05-04 PROCEDURE — 3288F PR FALLS RISK ASSESSMENT DOCUMENTED: ICD-10-PCS | Mod: CPTII,S$GLB,, | Performed by: NURSE PRACTITIONER

## 2023-05-04 PROCEDURE — 84153 ASSAY OF PSA TOTAL: CPT | Mod: HCNC | Performed by: NURSE PRACTITIONER

## 2023-05-04 PROCEDURE — 3079F PR MOST RECENT DIASTOLIC BLOOD PRESSURE 80-89 MM HG: ICD-10-PCS | Mod: CPTII,S$GLB,, | Performed by: NURSE PRACTITIONER

## 2023-05-04 PROCEDURE — 99999 PR PBB SHADOW E&M-EST. PATIENT-LVL IV: ICD-10-PCS | Mod: PBBFAC,,, | Performed by: NURSE PRACTITIONER

## 2023-05-04 PROCEDURE — 3079F DIAST BP 80-89 MM HG: CPT | Mod: CPTII,S$GLB,, | Performed by: NURSE PRACTITIONER

## 2023-05-04 PROCEDURE — 36415 COLL VENOUS BLD VENIPUNCTURE: CPT | Mod: HCNC | Performed by: NURSE PRACTITIONER

## 2023-05-04 PROCEDURE — 99204 PR OFFICE/OUTPT VISIT, NEW, LEVL IV, 45-59 MIN: ICD-10-PCS | Mod: S$GLB,,, | Performed by: NURSE PRACTITIONER

## 2023-05-04 PROCEDURE — 3077F SYST BP >= 140 MM HG: CPT | Mod: CPTII,S$GLB,, | Performed by: NURSE PRACTITIONER

## 2023-05-04 PROCEDURE — 3077F PR MOST RECENT SYSTOLIC BLOOD PRESSURE >= 140 MM HG: ICD-10-PCS | Mod: CPTII,S$GLB,, | Performed by: NURSE PRACTITIONER

## 2023-05-04 PROCEDURE — 3008F BODY MASS INDEX DOCD: CPT | Mod: CPTII,S$GLB,, | Performed by: NURSE PRACTITIONER

## 2023-05-04 PROCEDURE — 3008F PR BODY MASS INDEX (BMI) DOCUMENTED: ICD-10-PCS | Mod: CPTII,S$GLB,, | Performed by: NURSE PRACTITIONER

## 2023-05-04 PROCEDURE — 81003 POCT URINALYSIS(INSTRUMENT): ICD-10-PCS | Mod: QW,S$GLB,, | Performed by: NURSE PRACTITIONER

## 2023-05-04 PROCEDURE — 1157F ADVNC CARE PLAN IN RCRD: CPT | Mod: CPTII,S$GLB,, | Performed by: NURSE PRACTITIONER

## 2023-05-04 PROCEDURE — 4010F ACE/ARB THERAPY RXD/TAKEN: CPT | Mod: CPTII,S$GLB,, | Performed by: NURSE PRACTITIONER

## 2023-05-04 PROCEDURE — 1101F PR PT FALLS ASSESS DOC 0-1 FALLS W/OUT INJ PAST YR: ICD-10-PCS | Mod: CPTII,S$GLB,, | Performed by: NURSE PRACTITIONER

## 2023-05-04 PROCEDURE — 99999 PR PBB SHADOW E&M-EST. PATIENT-LVL IV: CPT | Mod: PBBFAC,,, | Performed by: NURSE PRACTITIONER

## 2023-05-04 PROCEDURE — 4010F PR ACE/ARB THEARPY RXD/TAKEN: ICD-10-PCS | Mod: CPTII,S$GLB,, | Performed by: NURSE PRACTITIONER

## 2023-05-04 PROCEDURE — 3288F FALL RISK ASSESSMENT DOCD: CPT | Mod: CPTII,S$GLB,, | Performed by: NURSE PRACTITIONER

## 2023-05-04 RX ORDER — FINASTERIDE 5 MG/1
5 TABLET, FILM COATED ORAL DAILY
Qty: 90 TABLET | Refills: 3 | Status: SHIPPED | OUTPATIENT
Start: 2023-05-04 | End: 2024-04-02

## 2023-05-04 RX ORDER — TAMSULOSIN HYDROCHLORIDE 0.4 MG/1
0.8 CAPSULE ORAL DAILY
Qty: 180 CAPSULE | Refills: 3 | Status: SHIPPED | OUTPATIENT
Start: 2023-05-04 | End: 2024-04-02

## 2023-05-04 NOTE — PROGRESS NOTES
"[facility] Note  Staff: AMY Campos-C    PCP: TEX Tompkins    Chief Complaint: Annual Exam    Subjective:        HPI: Sammy Muniz is a 72 y.o. male NEW PT presents today in order to re-establish with Urology office at this time.    Pt is currently having increased nocturia and erectile dysfunction issues therefore here today for further evaluation.     HX:  Pt was last evaluated by Dr. Chow in 2019 for hx of renal calculi and BPH    UA in office today showed Small Bili, trace ketones, and 30 of Protein at this time.  No s/s of dysuria or gross hematuria today.  Family Hx of  Cancers?:  None  +Hx of Kidney stones  Hx of Tobacco use?:  None    Erectile Dysfunction:  Has tried Viagra and Cialis in the past with no improvement in erections.  Does not want to try another medication would like to know what further options he would have for adequate erections.  He did take injections (unsure what type) from a "male clinic" many years ago    BPH with LUTS:  Currently takes Flomax 0.4 mg daily  Denies gross hematuria or dysuria at this time.  AUA SS Today:  19/3 Mixed  Feeling of ICBE:  1  Frequency:3  Intermittency:2  Urgency:2  Weak urine stream:4  Straining:3  Nocturia:4      REVIEW OF SYSTEMS:  A comprehensive 10 system review was performed and is negative except as noted above in HPI    PMHx:  Past Medical History:   Diagnosis Date    Anxiety and depression     Arthritis     b/l Knee and Shoulder replacements    Biliary colic 11/2022    BPH (benign prostatic hyperplasia)     Hyperlipidemia     Hypertension     Kidney stone     Sleep apnea     Doews not use C-pap     PSHx:  Past Surgical History:   Procedure Laterality Date    ARTHROPLASTY OF SHOULDER Left 09/24/2019    Procedure: ARTHROPLASTY, SHOULDER;  Surgeon: Abad Miller Jr., MD;  Location: [facility];  Service: Orthopedics;  Laterality: Left;  Biomet-Ranjan Lopes notified 9/20-tcb    CERVICAL FUSION      CYSTOSCOPY      " ENDOSCOPIC ULTRASOUND OF UPPER GASTROINTESTINAL TRACT Left 5/10/2023    Procedure: ULTRASOUND, UPPER GI TRACT, ENDOSCOPIC;  Surgeon: Nhan Garza MD;  Location: [facility] ENDO;  Service: Endoscopy;  Laterality: Left;    ESOPHAGOGASTRODUODENOSCOPY N/A 5/10/2023    Procedure: EGD (ESOPHAGOGASTRODUODENOSCOPY);  Surgeon: Nhan Garza MD;  Location: [facility] ENDO;  Service: Endoscopy;  Laterality: N/A;    HERNIA REPAIR      ventral hernia    JOINT REPLACEMENT      Bilateral knees & Rt shoulder    PROSTATE SURGERY      greenlight laser    ROBOT-ASSISTED CHOLECYSTECTOMY USING DA NOLA XI N/A 11/15/2022    Procedure: XI ROBOTIC CHOLECYSTECTOMY;  Surgeon: Yung Pond MD;  Location: [facility] OR;  Service: General;  Laterality: N/A;    SLEEVE GASTROPLASTY  2012    TONSILLECTOMY      TOTAL KNEE ARTHROPLASTY  left    TOTAL SHOULDER ARTHROPLASTY  right    TYMPANOPLASTY  left    VASECTOMY       Allergies:  Iodinated contrast media    Medications: reviewed   Objective:     Vitals:    05/04/23 1115   BP: (!) 151/86   Pulse: 74     General:WDWN in NAD  Eyes: PERRLA, normal conjunctiva  Respiratory: no increased work on breathing, clear to auscultation  Cardiovascular: regular rate and rhythm. No obvious extremity edema.  GI: palpation of masses. No tenderness. No hepatosplenomegaly to palpation.  Musculoskeletal: normal range of motion of bilateral upper extremities. Normal muscle strength and tone.  Skin: no obvious rashes or lesions. No tightening of skin noted.  Neurologic: CN grossly normal. Normal sensation.   Psychiatric: awake, alert and oriented x 3. Mood and affect normal. Cooperative.     Exam performed by me during OV today:  Inspection of anus and perineum normal  No scrotal rashes, cysts or lesions  Epididymis normal in size, no tenderness  Testes normal and size, equal size bilaterally, no masses  Urethral meatus normal without discharge  Penis is circumcised,    ANDREW: 30g-35g enlarged smooth prostate gland  without nodules, masses, tenderness. SV not palpable. Normal sphincter tone.     Assessment:       1. BPH (benign prostatic hypertrophy) with urinary obstruction          Plan:     Increase Flomax to 0.8 mg daily at this time.  Start Finasteride 5 mg one tablet daily.  PSA, Total and Free to be scheduled.    F/u in six months.    [facility]: Active    Gita Lehman, FNP-C

## 2023-06-02 ENCOUNTER — TELEPHONE (OUTPATIENT)
Dept: CARDIOLOGY | Facility: CLINIC | Age: 73
End: 2023-06-02
Payer: MEDICARE

## 2023-06-13 ENCOUNTER — OFFICE VISIT (OUTPATIENT)
Dept: CARDIOLOGY | Facility: CLINIC | Age: 73
End: 2023-06-13
Payer: MEDICARE

## 2023-06-13 VITALS
SYSTOLIC BLOOD PRESSURE: 132 MMHG | BODY MASS INDEX: 37.65 KG/M2 | DIASTOLIC BLOOD PRESSURE: 74 MMHG | HEART RATE: 74 BPM | WEIGHT: 263 LBS | OXYGEN SATURATION: 96 % | HEIGHT: 70 IN

## 2023-06-13 DIAGNOSIS — I10 ESSENTIAL HYPERTENSION: ICD-10-CM

## 2023-06-13 DIAGNOSIS — R06.02 SOB (SHORTNESS OF BREATH): Primary | ICD-10-CM

## 2023-06-13 DIAGNOSIS — G47.30 SLEEP APNEA, UNSPECIFIED TYPE: ICD-10-CM

## 2023-06-13 DIAGNOSIS — E78.00 PURE HYPERCHOLESTEROLEMIA: ICD-10-CM

## 2023-06-13 PROCEDURE — 1126F AMNT PAIN NOTED NONE PRSNT: CPT | Mod: CPTII,S$GLB,, | Performed by: NURSE PRACTITIONER

## 2023-06-13 PROCEDURE — 1157F PR ADVANCE CARE PLAN OR EQUIV PRESENT IN MEDICAL RECORD: ICD-10-PCS | Mod: CPTII,S$GLB,, | Performed by: NURSE PRACTITIONER

## 2023-06-13 PROCEDURE — 3075F PR MOST RECENT SYSTOLIC BLOOD PRESS GE 130-139MM HG: ICD-10-PCS | Mod: CPTII,S$GLB,, | Performed by: NURSE PRACTITIONER

## 2023-06-13 PROCEDURE — 3075F SYST BP GE 130 - 139MM HG: CPT | Mod: CPTII,S$GLB,, | Performed by: NURSE PRACTITIONER

## 2023-06-13 PROCEDURE — 3078F DIAST BP <80 MM HG: CPT | Mod: CPTII,S$GLB,, | Performed by: NURSE PRACTITIONER

## 2023-06-13 PROCEDURE — 99999 PR PBB SHADOW E&M-EST. PATIENT-LVL III: CPT | Mod: PBBFAC,,, | Performed by: NURSE PRACTITIONER

## 2023-06-13 PROCEDURE — 3288F FALL RISK ASSESSMENT DOCD: CPT | Mod: CPTII,S$GLB,, | Performed by: NURSE PRACTITIONER

## 2023-06-13 PROCEDURE — 3008F BODY MASS INDEX DOCD: CPT | Mod: CPTII,S$GLB,, | Performed by: NURSE PRACTITIONER

## 2023-06-13 PROCEDURE — 4010F ACE/ARB THERAPY RXD/TAKEN: CPT | Mod: CPTII,S$GLB,, | Performed by: NURSE PRACTITIONER

## 2023-06-13 PROCEDURE — 1101F PR PT FALLS ASSESS DOC 0-1 FALLS W/OUT INJ PAST YR: ICD-10-PCS | Mod: CPTII,S$GLB,, | Performed by: NURSE PRACTITIONER

## 2023-06-13 PROCEDURE — 93000 EKG 12-LEAD: ICD-10-PCS | Mod: HCNC,S$GLB,, | Performed by: INTERNAL MEDICINE

## 2023-06-13 PROCEDURE — 1159F PR MEDICATION LIST DOCUMENTED IN MEDICAL RECORD: ICD-10-PCS | Mod: CPTII,S$GLB,, | Performed by: NURSE PRACTITIONER

## 2023-06-13 PROCEDURE — 93000 ELECTROCARDIOGRAM COMPLETE: CPT | Mod: HCNC,S$GLB,, | Performed by: INTERNAL MEDICINE

## 2023-06-13 PROCEDURE — 1157F ADVNC CARE PLAN IN RCRD: CPT | Mod: CPTII,S$GLB,, | Performed by: NURSE PRACTITIONER

## 2023-06-13 PROCEDURE — 99214 PR OFFICE/OUTPT VISIT, EST, LEVL IV, 30-39 MIN: ICD-10-PCS | Mod: S$GLB,,, | Performed by: NURSE PRACTITIONER

## 2023-06-13 PROCEDURE — 3288F PR FALLS RISK ASSESSMENT DOCUMENTED: ICD-10-PCS | Mod: CPTII,S$GLB,, | Performed by: NURSE PRACTITIONER

## 2023-06-13 PROCEDURE — 1101F PT FALLS ASSESS-DOCD LE1/YR: CPT | Mod: CPTII,S$GLB,, | Performed by: NURSE PRACTITIONER

## 2023-06-13 PROCEDURE — 4010F PR ACE/ARB THEARPY RXD/TAKEN: ICD-10-PCS | Mod: CPTII,S$GLB,, | Performed by: NURSE PRACTITIONER

## 2023-06-13 PROCEDURE — 1126F PR PAIN SEVERITY QUANTIFIED, NO PAIN PRESENT: ICD-10-PCS | Mod: CPTII,S$GLB,, | Performed by: NURSE PRACTITIONER

## 2023-06-13 PROCEDURE — 99999 PR PBB SHADOW E&M-EST. PATIENT-LVL III: ICD-10-PCS | Mod: PBBFAC,,, | Performed by: NURSE PRACTITIONER

## 2023-06-13 PROCEDURE — 3078F PR MOST RECENT DIASTOLIC BLOOD PRESSURE < 80 MM HG: ICD-10-PCS | Mod: CPTII,S$GLB,, | Performed by: NURSE PRACTITIONER

## 2023-06-13 PROCEDURE — 1159F MED LIST DOCD IN RCRD: CPT | Mod: CPTII,S$GLB,, | Performed by: NURSE PRACTITIONER

## 2023-06-13 PROCEDURE — 3008F PR BODY MASS INDEX (BMI) DOCUMENTED: ICD-10-PCS | Mod: CPTII,S$GLB,, | Performed by: NURSE PRACTITIONER

## 2023-06-13 PROCEDURE — 99214 OFFICE O/P EST MOD 30 MIN: CPT | Mod: S$GLB,,, | Performed by: NURSE PRACTITIONER

## 2023-06-13 RX ORDER — MONTELUKAST SODIUM 4 MG/1
2 TABLET, CHEWABLE ORAL
COMMUNITY
Start: 2023-05-04

## 2023-06-13 NOTE — PROGRESS NOTES
Subjective:    Patient ID:  Sammy Muniz is a 72 y.o. male patient here for evaluation Follow-up, Shortness of Breath, and Fatigue (AFTER GETTING WINDED )      History of Present Illness:         Mr. Muniz is here today for his follow up visit. He has been getting winded with minimal exertion. He denies CP. He has an increase in fatigue and tiredness.    Review of patient's allergies indicates:   Allergen Reactions    Iodinated contrast media Itching       Past Medical History:   Diagnosis Date    Anxiety and depression     Arthritis     b/l Knee and Shoulder replacements    Biliary colic 11/2022    BPH (benign prostatic hyperplasia)     Hyperlipidemia     Hypertension     Kidney stone     Sleep apnea     Doews not use C-pap     Past Surgical History:   Procedure Laterality Date    ARTHROPLASTY OF SHOULDER Left 09/24/2019    Procedure: ARTHROPLASTY, SHOULDER;  Surgeon: Abad Miller Jr., MD;  Location: [facility];  Service: Orthopedics;  Laterality: Left;  Biomet-Ranjan Lopes notified 9/20-tcb    CERVICAL FUSION      CYSTOSCOPY      ENDOSCOPIC ULTRASOUND OF UPPER GASTROINTESTINAL TRACT Left 5/10/2023    Procedure: ULTRASOUND, UPPER GI TRACT, ENDOSCOPIC;  Surgeon: Nhan Garza MD;  Location: [facility];  Service: Endoscopy;  Laterality: Left;    ESOPHAGOGASTRODUODENOSCOPY N/A 5/10/2023    Procedure: EGD (ESOPHAGOGASTRODUODENOSCOPY);  Surgeon: Nhan Garza MD;  Location: [facility];  Service: Endoscopy;  Laterality: N/A;    HERNIA REPAIR      ventral hernia    JOINT REPLACEMENT      Bilateral knees & Rt shoulder    PROSTATE SURGERY      greenlight laser    ROBOT-ASSISTED CHOLECYSTECTOMY USING DA NOLA XI N/A 11/15/2022    Procedure: XI ROBOTIC CHOLECYSTECTOMY;  Surgeon: Yung Pond MD;  Location: [facility];  Service: General;  Laterality: N/A;    SLEEVE GASTROPLASTY  2012    TONSILLECTOMY      TOTAL KNEE ARTHROPLASTY  left    TOTAL SHOULDER ARTHROPLASTY  right    TYMPANOPLASTY  left    VASECTOMY        Social History     Tobacco Use    Smoking status: Never    Smokeless tobacco: Never   Substance Use Topics    Alcohol use: No    Drug use: No        Review of Systems:    As noted in HPI in addition                 Objective        Vitals:    06/13/23 1458   BP: 132/74   Pulse: 74       LIPIDS - LAST 2   Lab Results   Component Value Date    CHOL 163 07/22/2021    CHOL 164 08/26/2020    HDL 43 07/22/2021    HDL 41 08/26/2020    LDLCALC 92 07/22/2021    LDLCALC 94 08/26/2020    TRIG 181 (H) 07/22/2021    TRIG 191 08/26/2020    CHOLHDL 3.8 07/22/2021    CHOLHDL 24.5 06/06/2012       CBC - LAST 2  Lab Results   Component Value Date    WBC 5.12 08/01/2022    WBC 4.5 12/17/2021    RBC 4.27 (L) 08/01/2022    RBC 4.70 12/17/2021    HGB 13.8 (L) 11/15/2022    HGB 13.2 (L) 08/01/2022    HCT 39.5 (L) 08/01/2022    HCT 42.7 12/17/2021    MCV 93 08/01/2022    MCV 90.9 12/17/2021    MCH 30.9 08/01/2022    MCH 30.6 12/17/2021    MCHC 33.4 08/01/2022    MCHC 33.7 12/17/2021    RDW 12.2 08/01/2022    RDW 11.8 12/17/2021     08/01/2022     12/17/2021    MPV 10.0 08/01/2022    MPV 10.3 12/17/2021    GRAN 3.2 08/01/2022    GRAN 61.9 08/01/2022    LYMPH 1.5 08/01/2022    LYMPH 28.9 08/01/2022    MONO 0.3 08/01/2022    MONO 5.9 08/01/2022    BASO 0.02 08/01/2022    BASO 41 12/17/2021    NRBC 0 08/01/2022    NRBC 0 09/25/2019       CHEMISTRY & LIVER FUNCTION - LAST 2  Lab Results   Component Value Date     11/15/2022     08/01/2022    K 3.7 11/15/2022    K 3.5 08/01/2022     11/15/2022     08/01/2022    CO2 30 11/15/2022    CO2 25 08/01/2022    ANIONGAP 8 11/15/2022    ANIONGAP 12 08/01/2022    BUN 15 11/15/2022    BUN 17 08/01/2022    CREATININE 0.77 11/15/2022    CREATININE 1.1 08/01/2022     (H) 11/15/2022     (H) 08/01/2022    CALCIUM 8.8 11/15/2022    CALCIUM 9.1 08/01/2022    MG 2.2 11/18/2011    MG 1.8 08/17/2011    ALBUMIN 4.0 08/01/2022    ALBUMIN 4.7 12/17/2021    PROT  6.9 08/01/2022    PROT 7.0 12/17/2021    ALKPHOS 61 08/01/2022    ALKPHOS 60 09/10/2019    ALT 32 08/01/2022    ALT 35 12/17/2021    AST 20 08/01/2022    AST 24 12/17/2021    BILITOT 0.3 08/01/2022    BILITOT 0.6 12/17/2021        CARDIAC PROFILE - LAST 2  Lab Results   Component Value Date    BNP 51 12/24/2020        COAGULATION - LAST 2  Lab Results   Component Value Date    INR 1.05 01/31/2012    APTT 29.6 01/31/2012       ENDOCRINE & PSA - LAST 2  Lab Results   Component Value Date    HGBA1C 5.8 (H) 11/15/2022    HGBA1C 5.6 08/17/2011    TSH 2.55 07/22/2021    TSH 1.86 08/17/2011    PSA 0.32 01/08/2013        ECHOCARDIOGRAM RESULTS  Results for orders placed during the hospital encounter of 05/26/21    Echo Color Flow Doppler? Yes    Interpretation Summary  · The left ventricle is normal in size with moderate concentric hypertrophy and normal systolic function.  · The estimated ejection fraction is 65%.  · Normal left ventricular diastolic function.  · Normal right ventricular size with normal right ventricular systolic function.  · Mild left atrial enlargement.  · Mild tricuspid regurgitation.  · Normal central venous pressure (3 mmHg).  · The estimated PA systolic pressure is 27 mmHg.      CURRENT/PREVIOUS VISIT EKG  Results for orders placed or performed in visit on 08/23/22   IN OFFICE EKG 12-LEAD (to [place])    Collection Time: 08/23/22 11:04 AM    Narrative    Test Reason : I10,    Vent. Rate : 064 BPM     Atrial Rate : 064 BPM     P-R Int : 188 ms          QRS Dur : 090 ms      QT Int : 422 ms       P-R-T Axes : 050 032 023 degrees     QTc Int : 435 ms    Normal sinus rhythm  Normal ECG  When compared with ECG of 10-MAY-2021 14:58,  No significant change was found  Confirmed by Amador Mirza MD ([id number]) on 10/21/2022 4:43:08 PM    Referred By:             Confirmed By:Amador Mirza MD     No valid procedures specified.   Results for orders placed during the hospital encounter of 05/26/21    Nuclear  Stress - Cardiology Interpreted    Interpretation Summary    Abnormal myocardial perfusion scan.    There is a moderate to severe intensity, moderate sized, equivocal defect that is fixed in the basal to apical inferior wall(s). This finding is equivocal due to diaphragm shadow.    There is a  moderate to severe intensity fixed defect in the inferior wall of the left ventricle secondary to diaphragm attenuation.    The gated perfusion images showed an ejection fraction of 60% post stress. Normal ejection fraction is greater than 53%.    There is normal wall motion post stress.    LV cavity size is  and normal at stress.    The EKG portion of this study is negative for ischemia.    The patient reported no chest pain during the stress test.    There were no arrhythmias during stress.      PHYSICAL EXAM  CONSTITUTIONAL: Well built, well nourished in no apparent distress  NECK: no carotid bruit, no JVD  LUNGS: CTA  CHEST WALL: no tenderness  HEART: regular rate and rhythm, S1, S2 normal, no murmur, click, rub or gallop   ABDOMEN: soft, non-tender; bowel sounds normal; no masses,  no organomegaly  EXTREMITIES: Extremities normal, no edema, no calf tenderness noted  NEURO: AAO X 3    I HAVE REVIEWED :    The vital signs, nurses notes, and all the pertinent radiology and labs.        Current Outpatient Medications   Medication Instructions    amlodipine-valsartan (EXFORGE) 5-320 mg per tablet TAKE 1 TABLET EVERY DAY    celecoxib (CELEBREX) 100 MG capsule TAKE 1 CAPSULE ONE TIME DAILY    citalopram (CELEXA) 20 MG tablet TAKE 1 TABLET EVERY DAY    colestipoL (COLESTID) 2 g, Oral    finasteride (PROSCAR) 5 mg, Oral, Daily    fish oil-dha-epa 1,200-144-216 mg Cap 1 capsule, Oral, 2 times daily,      labetaloL (NORMODYNE) 200 MG tablet TAKE 1 TABLET TWICE DAILY    pantoprazole (PROTONIX) 40 MG tablet TAKE 1 TABLET EVERY DAY    potassium chloride SA (K-DUR,KLOR-CON) 20 MEQ tablet TAKE 1 TABLET TWICE DAILY    pravastatin  (PRAVACHOL) 40 MG tablet TAKE 1 TABLET ONE TIME DAILY IN THE MORNING    spironolactone (ALDACTONE) 50 MG tablet TAKE 1 TABLET ONE TIME DAILY    tamsulosin (FLOMAX) 0.8 mg, Oral, Daily, Take in evening.          Assessment & Plan     SOB (shortness of breath)  Check ECHO  Peggy    Sleep apnea  Has not been using CPAP    Hyperlipidemia  Recent labs with Dr. Zamora    Essential hypertension  BP stable currently continue same regimen          No follow-ups on file.

## 2023-06-21 ENCOUNTER — PATIENT MESSAGE (OUTPATIENT)
Dept: CARDIOLOGY | Facility: HOSPITAL | Age: 73
End: 2023-06-21
Payer: MEDICARE

## 2023-06-21 ENCOUNTER — PES CALL (OUTPATIENT)
Dept: ADMINISTRATIVE | Facility: CLINIC | Age: 73
End: 2023-06-21
Payer: MEDICARE

## 2023-06-22 ENCOUNTER — CLINICAL SUPPORT (OUTPATIENT)
Dept: CARDIOLOGY | Facility: HOSPITAL | Age: 73
End: 2023-06-22
Attending: NURSE PRACTITIONER
Payer: MEDICARE

## 2023-06-22 ENCOUNTER — HOSPITAL ENCOUNTER (OUTPATIENT)
Dept: RADIOLOGY | Facility: HOSPITAL | Age: 73
Discharge: HOME | End: 2023-06-22
Attending: NURSE PRACTITIONER
Payer: MEDICARE

## 2023-06-22 VITALS — HEIGHT: 70 IN | WEIGHT: 263 LBS | BODY MASS INDEX: 37.65 KG/M2

## 2023-06-22 DIAGNOSIS — R06.02 SOB (SHORTNESS OF BREATH): ICD-10-CM

## 2023-06-22 LAB
CV PHARM DOSE: 0.4 MG
CV STRESS BASE HR: 82 BPM
DIASTOLIC BLOOD PRESSURE: 94 MMHG
NUC STRESS EJECTION FRACTION: 70 %
OHS CV CPX 1 MINUTE RECOVERY HEART RATE: 86 BPM
OHS CV CPX 85 PERCENT MAX PREDICTED HEART RATE MALE: 128
OHS CV CPX MAX PREDICTED HEART RATE: 150
OHS CV CPX PATIENT IS FEMALE: 0
OHS CV CPX PATIENT IS MALE: 1
OHS CV CPX PEAK DIASTOLIC BLOOD PRESSURE: 84 MMHG
OHS CV CPX PEAK HEAR RATE: 86 BPM
OHS CV CPX PEAK RATE PRESSURE PRODUCT: NORMAL
OHS CV CPX PEAK SYSTOLIC BLOOD PRESSURE: 146 MMHG
OHS CV CPX PERCENT MAX PREDICTED HEART RATE ACHIEVED: 57
OHS CV CPX RATE PRESSURE PRODUCT PRESENTING: NORMAL
OHS CV PHARM TIME: 939 MIN
SYSTOLIC BLOOD PRESSURE: 134 MMHG

## 2023-06-22 PROCEDURE — 93017 CV STRESS TEST TRACING ONLY: CPT | Mod: PO

## 2023-06-22 PROCEDURE — 78452 HT MUSCLE IMAGE SPECT MULT: CPT | Mod: 26,,, | Performed by: INTERNAL MEDICINE

## 2023-06-22 PROCEDURE — 63600175 PHARM REV CODE 636 W HCPCS: Mod: PO | Performed by: NURSE PRACTITIONER

## 2023-06-22 PROCEDURE — 93018 CV STRESS TEST I&R ONLY: CPT | Mod: ,,, | Performed by: INTERNAL MEDICINE

## 2023-06-22 PROCEDURE — A9502 TC99M TETROFOSMIN: HCPCS | Mod: PO

## 2023-06-22 PROCEDURE — 93016 CV STRESS TEST SUPVJ ONLY: CPT | Mod: ,,, | Performed by: INTERNAL MEDICINE

## 2023-06-22 PROCEDURE — 78452 HT MUSCLE IMAGE SPECT MULT: CPT | Mod: PO

## 2023-06-22 PROCEDURE — 93018 PR CARDIAC STRESS TST,INTERP/REPT ONLY: ICD-10-PCS | Mod: ,,, | Performed by: INTERNAL MEDICINE

## 2023-06-22 PROCEDURE — 78452 NUCLEAR STRESS - CARDIOLOGY INTERPRETED (CUPID ONLY): ICD-10-PCS | Mod: 26,,, | Performed by: INTERNAL MEDICINE

## 2023-06-22 PROCEDURE — 93016 NUCLEAR STRESS - CARDIOLOGY INTERPRETED (CUPID ONLY): ICD-10-PCS | Mod: ,,, | Performed by: INTERNAL MEDICINE

## 2023-06-22 RX ORDER — REGADENOSON 0.08 MG/ML
0.4 INJECTION, SOLUTION INTRAVENOUS
Status: COMPLETED | OUTPATIENT
Start: 2023-06-22 | End: 2023-06-22

## 2023-06-22 RX ADMIN — REGADENOSON 0.4 MG: 0.08 INJECTION, SOLUTION INTRAVENOUS at 09:06

## 2023-06-24 DIAGNOSIS — M19.012 PRIMARY OSTEOARTHRITIS OF LEFT SHOULDER: ICD-10-CM

## 2023-06-26 RX ORDER — CELECOXIB 100 MG/1
CAPSULE ORAL
Qty: 90 CAPSULE | Refills: 3 | Status: SHIPPED | OUTPATIENT
Start: 2023-06-26 | End: 2024-04-02

## 2023-06-27 RX ORDER — AMLODIPINE AND VALSARTAN 5; 320 MG/1; MG/1
TABLET ORAL
Qty: 90 TABLET | Refills: 3 | Status: SHIPPED | OUTPATIENT
Start: 2023-06-27

## 2023-06-29 ENCOUNTER — CLINICAL SUPPORT (OUTPATIENT)
Dept: CARDIOLOGY | Facility: HOSPITAL | Age: 73
End: 2023-06-29
Attending: NURSE PRACTITIONER
Payer: MEDICARE

## 2023-06-29 VITALS — HEIGHT: 70 IN | WEIGHT: 263 LBS | BODY MASS INDEX: 37.65 KG/M2

## 2023-06-29 DIAGNOSIS — R06.02 SOB (SHORTNESS OF BREATH): ICD-10-CM

## 2023-06-29 LAB
ASCENDING AORTA: 3.72 CM
AV INDEX (PROSTH): 0.6
AV MEAN GRADIENT: 5 MMHG
AV PEAK GRADIENT: 10 MMHG
AV VALVE AREA: 2.37 CM2
AV VELOCITY RATIO: 0.74
BSA FOR ECHO PROCEDURE: 2.43 M2
CV ECHO LV RWT: 0.49 CM
DOP CALC AO PEAK VEL: 1.56 M/S
DOP CALC AO VTI: 39 CM
DOP CALC LVOT AREA: 3.9 CM2
DOP CALC LVOT DIAMETER: 2.24 CM
DOP CALC LVOT PEAK VEL: 1.16 M/S
DOP CALC LVOT STROKE VOLUME: 92.56 CM3
DOP CALCLVOT PEAK VEL VTI: 23.5 CM
E WAVE DECELERATION TIME: 281.99 MSEC
E/A RATIO: 0.66
E/E' RATIO: 7.06 M/S
ECHO LV POSTERIOR WALL: 1.07 CM (ref 0.6–1.1)
EJECTION FRACTION: 60 %
FRACTIONAL SHORTENING: 31 % (ref 28–44)
INTERVENTRICULAR SEPTUM: 1.42 CM (ref 0.6–1.1)
IVRT: 106.57 MSEC
LA MAJOR: 5.18 CM
LA MINOR: 5.39 CM
LA WIDTH: 4.3 CM
LEFT ATRIUM SIZE: 3.43 CM
LEFT ATRIUM VOLUME INDEX: 28.3 ML/M2
LEFT ATRIUM VOLUME: 66.23 CM3
LEFT INTERNAL DIMENSION IN SYSTOLE: 3.03 CM (ref 2.1–4)
LEFT VENTRICLE DIASTOLIC VOLUME INDEX: 37.15 ML/M2
LEFT VENTRICLE DIASTOLIC VOLUME: 86.93 ML
LEFT VENTRICLE MASS INDEX: 86 G/M2
LEFT VENTRICLE SYSTOLIC VOLUME INDEX: 15.4 ML/M2
LEFT VENTRICLE SYSTOLIC VOLUME: 35.96 ML
LEFT VENTRICULAR INTERNAL DIMENSION IN DIASTOLE: 4.38 CM (ref 3.5–6)
LEFT VENTRICULAR MASS: 200.46 G
LV LATERAL E/E' RATIO: 7.5 M/S
LV SEPTAL E/E' RATIO: 6.67 M/S
LVOT MG: 2.49 MMHG
LVOT MV: 0.72 CM/S
MV PEAK A VEL: 0.91 M/S
MV PEAK E VEL: 0.6 M/S
MV STENOSIS PRESSURE HALF TIME: 81.78 MS
MV VALVE AREA P 1/2 METHOD: 2.69 CM2
PISA TR MAX VEL: 2.87 M/S
PULM VEIN S/D RATIO: 1.5
PV PEAK D VEL: 0.28 M/S
PV PEAK S VEL: 0.42 M/S
RA MAJOR: 3.93 CM
RA PRESSURE: 8 MMHG
RA WIDTH: 3.7 CM
RIGHT VENTRICULAR END-DIASTOLIC DIMENSION: 3.17 CM
RIGHT VENTRICULAR LENGTH IN DIASTOLE (APICAL 4-CHAMBER VIEW): 5.56 CM
RV MID DIAMA: 2.18 CM
RV TISSUE DOPPLER FREE WALL SYSTOLIC VELOCITY 1 (APICAL 4 CHAMBER VIEW): 15.05 CM/S
SINUS: 3.19 CM
STJ: 2.91 CM
TDI LATERAL: 0.08 M/S
TDI SEPTAL: 0.09 M/S
TDI: 0.09 M/S
TR MAX PG: 33 MMHG
TRICUSPID ANNULAR PLANE SYSTOLIC EXCURSION: 1.99 CM
TV REST PULMONARY ARTERY PRESSURE: 41 MMHG

## 2023-06-29 PROCEDURE — 93306 ECHO (CUPID ONLY): ICD-10-PCS | Mod: 26,,, | Performed by: INTERNAL MEDICINE

## 2023-06-29 PROCEDURE — 93306 TTE W/DOPPLER COMPLETE: CPT | Mod: 26,,, | Performed by: INTERNAL MEDICINE

## 2023-06-29 PROCEDURE — 93306 TTE W/DOPPLER COMPLETE: CPT | Mod: PO

## 2023-07-27 ENCOUNTER — OFFICE VISIT (OUTPATIENT)
Dept: UROLOGY | Facility: CLINIC | Age: 73
End: 2023-07-27
Payer: MEDICARE

## 2023-07-27 VITALS
DIASTOLIC BLOOD PRESSURE: 87 MMHG | WEIGHT: 260 LBS | HEART RATE: 72 BPM | BODY MASS INDEX: 37.22 KG/M2 | SYSTOLIC BLOOD PRESSURE: 144 MMHG | HEIGHT: 70 IN

## 2023-07-27 DIAGNOSIS — N52.8 OTHER MALE ERECTILE DYSFUNCTION: Primary | ICD-10-CM

## 2023-07-27 PROCEDURE — 3288F PR FALLS RISK ASSESSMENT DOCUMENTED: ICD-10-PCS | Mod: HCNC,CPTII,S$GLB, | Performed by: UROLOGY

## 2023-07-27 PROCEDURE — 99214 PR OFFICE/OUTPT VISIT, EST, LEVL IV, 30-39 MIN: ICD-10-PCS | Mod: HCNC,S$GLB,, | Performed by: UROLOGY

## 2023-07-27 PROCEDURE — 3077F PR MOST RECENT SYSTOLIC BLOOD PRESSURE >= 140 MM HG: ICD-10-PCS | Mod: HCNC,CPTII,S$GLB, | Performed by: UROLOGY

## 2023-07-27 PROCEDURE — 1126F AMNT PAIN NOTED NONE PRSNT: CPT | Mod: HCNC,CPTII,S$GLB, | Performed by: UROLOGY

## 2023-07-27 PROCEDURE — 99214 OFFICE O/P EST MOD 30 MIN: CPT | Mod: HCNC,S$GLB,, | Performed by: UROLOGY

## 2023-07-27 PROCEDURE — 3077F SYST BP >= 140 MM HG: CPT | Mod: HCNC,CPTII,S$GLB, | Performed by: UROLOGY

## 2023-07-27 PROCEDURE — 3008F BODY MASS INDEX DOCD: CPT | Mod: HCNC,CPTII,S$GLB, | Performed by: UROLOGY

## 2023-07-27 PROCEDURE — 1159F PR MEDICATION LIST DOCUMENTED IN MEDICAL RECORD: ICD-10-PCS | Mod: HCNC,CPTII,S$GLB, | Performed by: UROLOGY

## 2023-07-27 PROCEDURE — 3079F DIAST BP 80-89 MM HG: CPT | Mod: HCNC,CPTII,S$GLB, | Performed by: UROLOGY

## 2023-07-27 PROCEDURE — 99999 PR PBB SHADOW E&M-EST. PATIENT-LVL III: CPT | Mod: PBBFAC,HCNC,, | Performed by: UROLOGY

## 2023-07-27 PROCEDURE — 1126F PR PAIN SEVERITY QUANTIFIED, NO PAIN PRESENT: ICD-10-PCS | Mod: HCNC,CPTII,S$GLB, | Performed by: UROLOGY

## 2023-07-27 PROCEDURE — 1160F RVW MEDS BY RX/DR IN RCRD: CPT | Mod: HCNC,CPTII,S$GLB, | Performed by: UROLOGY

## 2023-07-27 PROCEDURE — 1101F PT FALLS ASSESS-DOCD LE1/YR: CPT | Mod: HCNC,CPTII,S$GLB, | Performed by: UROLOGY

## 2023-07-27 PROCEDURE — 1157F ADVNC CARE PLAN IN RCRD: CPT | Mod: HCNC,CPTII,S$GLB, | Performed by: UROLOGY

## 2023-07-27 PROCEDURE — 1160F PR REVIEW ALL MEDS BY PRESCRIBER/CLIN PHARMACIST DOCUMENTED: ICD-10-PCS | Mod: HCNC,CPTII,S$GLB, | Performed by: UROLOGY

## 2023-07-27 PROCEDURE — 1159F MED LIST DOCD IN RCRD: CPT | Mod: HCNC,CPTII,S$GLB, | Performed by: UROLOGY

## 2023-07-27 PROCEDURE — 3288F FALL RISK ASSESSMENT DOCD: CPT | Mod: HCNC,CPTII,S$GLB, | Performed by: UROLOGY

## 2023-07-27 PROCEDURE — 4010F ACE/ARB THERAPY RXD/TAKEN: CPT | Mod: HCNC,CPTII,S$GLB, | Performed by: UROLOGY

## 2023-07-27 PROCEDURE — 4010F PR ACE/ARB THEARPY RXD/TAKEN: ICD-10-PCS | Mod: HCNC,CPTII,S$GLB, | Performed by: UROLOGY

## 2023-07-27 PROCEDURE — 3008F PR BODY MASS INDEX (BMI) DOCUMENTED: ICD-10-PCS | Mod: HCNC,CPTII,S$GLB, | Performed by: UROLOGY

## 2023-07-27 PROCEDURE — 1101F PR PT FALLS ASSESS DOC 0-1 FALLS W/OUT INJ PAST YR: ICD-10-PCS | Mod: HCNC,CPTII,S$GLB, | Performed by: UROLOGY

## 2023-07-27 PROCEDURE — 3079F PR MOST RECENT DIASTOLIC BLOOD PRESSURE 80-89 MM HG: ICD-10-PCS | Mod: HCNC,CPTII,S$GLB, | Performed by: UROLOGY

## 2023-07-27 PROCEDURE — 1157F PR ADVANCE CARE PLAN OR EQUIV PRESENT IN MEDICAL RECORD: ICD-10-PCS | Mod: HCNC,CPTII,S$GLB, | Performed by: UROLOGY

## 2023-07-27 PROCEDURE — 99999 PR PBB SHADOW E&M-EST. PATIENT-LVL III: ICD-10-PCS | Mod: PBBFAC,HCNC,, | Performed by: UROLOGY

## 2023-07-27 NOTE — PROGRESS NOTES
[facility] New Patient/H&P:    Sammy Muniz is a 72 y.o. male who presents for erectile dysfunction.    Patient with HLD, HTN, kidney stone and СЕРГЕЙ who presents with progressively worsening erectile dysfunction over the past several years.     Able to have an erection sufficient for intercourse. He states that he is 50% rigid, but he almost always has early detumescence. He has tried Viagra and Cialis in the past, but states he has had no significant improvement in his erectile function.     Does not wake up with an erection. No change in his libido.     States that he used self-injections in his 20's as he had premature ejaculation.     Of note, he has been managed in the past with Dr. Veronica and Geraldo in the past for BPH and kidney stones. Presently managed by CHIKA Lehman for his BPH. He is on Flomax and Proscar.    Retired [employer] .      Denies any fever, chills, gross hematuria, flank pain, bone pain, unintentional weight loss,  trauma or history of  malignancy.       PSA  0.32  5/4/23    GARRISON  13  7/27/23    Past Medical History:   Diagnosis Date    Anxiety and depression     Arthritis     b/l Knee and Shoulder replacements    Biliary colic 11/2022    BPH (benign prostatic hyperplasia)     Hyperlipidemia     Hypertension     Kidney stone     Sleep apnea     Doews not use C-pap       Past Surgical History:   Procedure Laterality Date    ARTHROPLASTY OF SHOULDER Left 09/24/2019    Procedure: ARTHROPLASTY, SHOULDER;  Surgeon: Abad Miller Jr., MD;  Location: [facility];  Service: Orthopedics;  Laterality: Left;  Biomet-Ranjan Lopes notified 9/20-tcb    CERVICAL FUSION      CYSTOSCOPY      ENDOSCOPIC ULTRASOUND OF UPPER GASTROINTESTINAL TRACT Left 5/10/2023    Procedure: ULTRASOUND, UPPER GI TRACT, ENDOSCOPIC;  Surgeon: Nhan Garza MD;  Location: [facility];  Service: Endoscopy;  Laterality: Left;    ESOPHAGOGASTRODUODENOSCOPY N/A 5/10/2023    Procedure: EGD  "(ESOPHAGOGASTRODUODENOSCOPY);  Surgeon: Nhan Garza MD;  Location: [facility] ENDO;  Service: Endoscopy;  Laterality: N/A;    HERNIA REPAIR      ventral hernia    JOINT REPLACEMENT      Bilateral knees & Rt shoulder    PROSTATE SURGERY      greenlight laser    ROBOT-ASSISTED CHOLECYSTECTOMY USING DA NOLA XI N/A 11/15/2022    Procedure: XI ROBOTIC CHOLECYSTECTOMY;  Surgeon: Yung Pond MD;  Location: [facility] OR;  Service: General;  Laterality: N/A;    SLEEVE GASTROPLASTY  2012    TONSILLECTOMY      TOTAL KNEE ARTHROPLASTY  left    TOTAL SHOULDER ARTHROPLASTY  right    TYMPANOPLASTY  left    VASECTOMY         Family History   Problem Relation Age of Onset    Benign prostatic hyperplasia Brother     Heart disease Mother     Kidney disease Mother     Arthritis Mother     Mental illness Father 84        dementia    Diabetes Father     Hypertension Father     Heart disease Father     Cancer Father 75        colon    Rectal cancer Brother     Prostate cancer Neg Hx     Urolithiasis Neg Hx     Kidney cancer Neg Hx        Review of patient's allergies indicates:   Allergen Reactions    Iodinated contrast media Itching       Medications Reviewed: see MAR      FOCUSED PHYSICAL EXAM:    Vitals:    07/27/23 0934   BP: (!) 144/87   Pulse: 72     Body mass index is 37.31 kg/m². Weight: 117.9 kg (260 lb) Height: 5' 10" (177.8 cm)       General: Alert, cooperative, no distress, appears stated age  Abdomen: Soft, non-tender, no CVA tenderness, non-distended      LABS:    No results found for this or any previous visit (from the past 336 hour(s)).      Assessment/Diagnosis:    1. Other male erectile dysfunction  TESTOSTERONE PANEL          Plans:    - I spent 30 minutes of the day of this encounter preparing for, treating and managing this patient. Extensive discussion with patient regarding the etiology and management of erectile dysfunction. Explained that erectile dysfunction is multi-factorial and can be secondary to " neurologic dysfunction, prolactinoma, advanced age, cardiac disease, hypertension,  trauma, DM, renal disease, substance abuse, hypogonadism, Peyronie's disease, post-surgery, medications, depression and anxiety. We discussed that treatment is contingent on finding the cause of his ED. Treatments include PDE - 5 inhibitors (e.g., Cialis, Viagra), vacuum erection device, tension rings, self-injections, transurethral suppositories and penile prosthesis.   - We discussed the benefit to obtaining a serum AM testosterone for work-up of his erectile dysfunction.   - RX for 30-1-10. States he used Trimix in his 20s for premature ejaculation with good results.

## 2023-07-28 ENCOUNTER — LAB VISIT (OUTPATIENT)
Dept: LAB | Facility: HOSPITAL | Age: 73
End: 2023-07-28
Attending: UROLOGY
Payer: MEDICARE

## 2023-07-28 DIAGNOSIS — N52.8 OTHER MALE ERECTILE DYSFUNCTION: ICD-10-CM

## 2023-07-28 PROCEDURE — 84270 ASSAY OF SEX HORMONE GLOBUL: CPT | Mod: HCNC | Performed by: UROLOGY

## 2023-07-28 PROCEDURE — 36415 COLL VENOUS BLD VENIPUNCTURE: CPT | Mod: HCNC,PO | Performed by: UROLOGY

## 2023-07-28 PROCEDURE — 84403 ASSAY OF TOTAL TESTOSTERONE: CPT | Mod: HCNC | Performed by: UROLOGY

## 2023-08-05 LAB
ALBUMIN SERPL-MCNC: 4.4 G/DL (ref 3.6–5.1)
SHBG SERPL-SCNC: 43 NMOL/L (ref 22–77)
TESTOST FREE SERPL-MCNC: 35.6 PG/ML (ref 6–73)
TESTOST SERPL-MCNC: 344 NG/DL (ref 250–1100)
TESTOSTERONE.FREE+WB SERPL-MCNC: 71.6 NG/DL (ref 15–150)

## 2023-08-14 ENCOUNTER — TELEPHONE (OUTPATIENT)
Dept: CARDIOLOGY | Facility: CLINIC | Age: 73
End: 2023-08-14
Payer: MEDICARE

## 2023-08-14 DIAGNOSIS — R06.02 SOB (SHORTNESS OF BREATH): Primary | ICD-10-CM

## 2023-08-14 NOTE — TELEPHONE ENCOUNTER
----- Message from Phyllis Dupont RN sent at 2023  1:22 PM CDT -----  Regardin/13 EKG Order  The EKG performed on 23 is missing an order.  Please place an order so that the EKG can be read in [place].    Thank you,  Phyllis Dupont RN  [facility]   [facility] Echo/ Stress Lab  3rd Floor Cardiology Clinic  [phone]/ [id number]

## 2023-09-29 ENCOUNTER — PATIENT MESSAGE (OUTPATIENT)
Dept: UROLOGY | Facility: CLINIC | Age: 73
End: 2023-09-29
Payer: MEDICARE

## 2023-11-25 DIAGNOSIS — I10 ESSENTIAL HYPERTENSION: ICD-10-CM

## 2023-11-25 DIAGNOSIS — K21.9 GASTROESOPHAGEAL REFLUX DISEASE, UNSPECIFIED WHETHER ESOPHAGITIS PRESENT: ICD-10-CM

## 2023-11-26 ENCOUNTER — PATIENT MESSAGE (OUTPATIENT)
Dept: FAMILY MEDICINE | Facility: CLINIC | Age: 73
End: 2023-11-26
Payer: MEDICARE

## 2023-11-26 RX ORDER — TAMSULOSIN HYDROCHLORIDE 0.4 MG/1
1 CAPSULE ORAL NIGHTLY
Qty: 90 CAPSULE | Refills: 3 | OUTPATIENT
Start: 2023-11-26

## 2023-11-26 NOTE — TELEPHONE ENCOUNTER
Pantoprazole--3-29-23  Labetalol--LR--3-30-23       LOV--8-1-22       FOV--None Noted  Message sent to patient with link to schedule annual visit.

## 2023-11-26 NOTE — TELEPHONE ENCOUNTER
Care Due:                  Date            Visit Type   Department     Provider  --------------------------------------------------------------------------------                                EP -                              PRIMARY      [facility]    Fab Isidro  Last Visit: 08-      CARE ([facility])   MEDICINE       Turner  Next Visit: None Scheduled  None         None Found                                                            Last  Test          Frequency    Reason                     Performed    Due Date  --------------------------------------------------------------------------------    Office Visit  12 months..  celecoxib, citalopram,     08- 07-                             pravastatin,                             spironolactone...........    CBC.........  12 months..  celecoxib................  08- 07-    CMP.........  12 months..  celecoxib, pravastatin,    08- 07-                             spironolactone...........    Lipid Panel.  12 months..  pravastatin..............  Not Found    Overdue    [facility] Embedded Care Due Messages. Reference number: [id number].   11/25/2023 6:59:37 PM CST

## 2023-11-27 RX ORDER — LABETALOL 200 MG/1
TABLET, FILM COATED ORAL
Qty: 180 TABLET | Refills: 0 | Status: SHIPPED | OUTPATIENT
Start: 2023-11-27

## 2023-11-27 RX ORDER — PANTOPRAZOLE SODIUM 40 MG/1
TABLET, DELAYED RELEASE ORAL
Qty: 90 TABLET | Refills: 0 | Status: SHIPPED | OUTPATIENT
Start: 2023-11-27

## 2023-11-27 NOTE — TELEPHONE ENCOUNTER
Pt no longer see's Dr. Tompkins as primary care. I advised pt to f/u with new PCP for further refills.

## 2024-03-07 ENCOUNTER — TELEPHONE (OUTPATIENT)
Dept: CARDIOLOGY | Facility: CLINIC | Age: 74
End: 2024-03-07
Payer: MEDICARE

## 2024-03-07 NOTE — TELEPHONE ENCOUNTER
----- Message from Rahul Page sent at 3/7/2024  8:55 AM CST -----  Contact: Self  Type: Needs Medical Advice  Who Called:  Patient    Best Call Back Number: [phone]   Additional Information: Called to speak with office regarding calcium heart score results. States he is concerned with score of 550. Please call.

## 2024-03-22 ENCOUNTER — OFFICE VISIT (OUTPATIENT)
Dept: CARDIOLOGY | Facility: CLINIC | Age: 74
End: 2024-03-22
Payer: MEDICARE

## 2024-03-22 VITALS
WEIGHT: 266 LBS | DIASTOLIC BLOOD PRESSURE: 80 MMHG | HEART RATE: 68 BPM | RESPIRATION RATE: 16 BRPM | OXYGEN SATURATION: 95 % | HEIGHT: 70 IN | SYSTOLIC BLOOD PRESSURE: 124 MMHG | BODY MASS INDEX: 38.08 KG/M2

## 2024-03-22 DIAGNOSIS — E66.01 MORBID (SEVERE) OBESITY DUE TO EXCESS CALORIES: ICD-10-CM

## 2024-03-22 DIAGNOSIS — I25.119 ATHEROSCLEROSIS OF NATIVE CORONARY ARTERY OF NATIVE HEART WITH ANGINA PECTORIS: ICD-10-CM

## 2024-03-22 DIAGNOSIS — R06.02 SOB (SHORTNESS OF BREATH): Primary | ICD-10-CM

## 2024-03-22 DIAGNOSIS — I10 ESSENTIAL HYPERTENSION: ICD-10-CM

## 2024-03-22 DIAGNOSIS — E78.2 MIXED HYPERLIPIDEMIA: ICD-10-CM

## 2024-03-22 DIAGNOSIS — I25.10 CORONARY ARTERY DISEASE WITHOUT ANGINA PECTORIS, UNSPECIFIED VESSEL OR LESION TYPE, UNSPECIFIED WHETHER NATIVE OR TRANSPLANTED HEART: ICD-10-CM

## 2024-03-22 DIAGNOSIS — G47.30 SLEEP APNEA, UNSPECIFIED TYPE: ICD-10-CM

## 2024-03-22 PROCEDURE — 1101F PT FALLS ASSESS-DOCD LE1/YR: CPT | Mod: HCNC,CPTII,S$GLB, | Performed by: INTERNAL MEDICINE

## 2024-03-22 PROCEDURE — 99999 PR PBB SHADOW E&M-EST. PATIENT-LVL IV: CPT | Mod: PBBFAC,HCNC,, | Performed by: INTERNAL MEDICINE

## 2024-03-22 PROCEDURE — 3008F BODY MASS INDEX DOCD: CPT | Mod: HCNC,CPTII,S$GLB, | Performed by: INTERNAL MEDICINE

## 2024-03-22 PROCEDURE — 1157F ADVNC CARE PLAN IN RCRD: CPT | Mod: HCNC,CPTII,S$GLB, | Performed by: INTERNAL MEDICINE

## 2024-03-22 PROCEDURE — 99215 OFFICE O/P EST HI 40 MIN: CPT | Mod: HCNC,S$GLB,, | Performed by: INTERNAL MEDICINE

## 2024-03-22 PROCEDURE — 1159F MED LIST DOCD IN RCRD: CPT | Mod: HCNC,CPTII,S$GLB, | Performed by: INTERNAL MEDICINE

## 2024-03-22 PROCEDURE — 4010F ACE/ARB THERAPY RXD/TAKEN: CPT | Mod: HCNC,CPTII,S$GLB, | Performed by: INTERNAL MEDICINE

## 2024-03-22 PROCEDURE — 93000 ELECTROCARDIOGRAM COMPLETE: CPT | Mod: HCNC,S$GLB,, | Performed by: INTERNAL MEDICINE

## 2024-03-22 PROCEDURE — 3079F DIAST BP 80-89 MM HG: CPT | Mod: HCNC,CPTII,S$GLB, | Performed by: INTERNAL MEDICINE

## 2024-03-22 PROCEDURE — 1126F AMNT PAIN NOTED NONE PRSNT: CPT | Mod: HCNC,CPTII,S$GLB, | Performed by: INTERNAL MEDICINE

## 2024-03-22 PROCEDURE — 3288F FALL RISK ASSESSMENT DOCD: CPT | Mod: HCNC,CPTII,S$GLB, | Performed by: INTERNAL MEDICINE

## 2024-03-22 PROCEDURE — 3074F SYST BP LT 130 MM HG: CPT | Mod: HCNC,CPTII,S$GLB, | Performed by: INTERNAL MEDICINE

## 2024-03-22 RX ORDER — DIPHENHYDRAMINE HCL 50 MG
50 CAPSULE ORAL
Status: CANCELLED | OUTPATIENT
Start: 2024-03-22

## 2024-03-22 RX ORDER — SODIUM CHLORIDE 9 MG/ML
INJECTION, SOLUTION INTRAVENOUS ONCE
Status: CANCELLED | OUTPATIENT
Start: 2024-03-22 | End: 2024-03-22

## 2024-03-22 NOTE — PROGRESS NOTES
Subjective:    Patient ID:  Sammy Muniz is a 73 y.o. male patient here for evaluation Results (Abnormal calcium score, he is having some sob/Stress and echo done in June 2023) and Shortness of Breath      History of Present Illness:     Patient is a 73-year-old gentleman who has noticed increasing shortness of breath and a recent trip to [place] he was short of breath walking 100 yd apparently.  This has been getting progressively worse even putting trash can out to the curb he has dyspnea on exertion.  Denies having any rest pain no arm neck or jaw pain noted.  His primary care her ordered a calcium score and this has abnormal at a total score of 521.    The LAD score was 183, left circumflex artery 26, left main 4, right coronary artery 248, right PDA 0   He is also noted to have dilatation of his ascending aorta measuring 4.4 cm and the descending aorta measures 3.3 cm.  Patient has no significant wheezing at this time  In the past year or so he has gained about 20 lb over the course of time with predominantly truncal obesity.  No edema noted in the lower extremities no symptoms of PND or orthopnea noted.    His previous echocardiogram obtained in June she was normal LV function myocardial perfusion study did not show any evidence of ischemia.        Review of patient's allergies indicates:   Allergen Reactions    Iodinated contrast media Itching       Past Medical History:   Diagnosis Date    Anxiety and depression     Arthritis     b/l Knee and Shoulder replacements    Atherosclerosis of native coronary artery of native heart with angina pectoris 3/22/2024    Biliary colic 11/2022    BPH (benign prostatic hyperplasia)     Hyperlipidemia     Hypertension     Kidney stone     Sleep apnea     Doews not use C-pap     Past Surgical History:   Procedure Laterality Date    ARTHROPLASTY OF SHOULDER Left 09/24/2019    Procedure: ARTHROPLASTY, SHOULDER;  Surgeon: Abad Miller Jr., MD;  Location: [facility] OR;   Service: Orthopedics;  Laterality: Left;  Pratik-Ranjan Lopes notified 9/20-tcb    CERVICAL FUSION      CYSTOSCOPY      ENDOSCOPIC ULTRASOUND OF UPPER GASTROINTESTINAL TRACT Left 5/10/2023    Procedure: ULTRASOUND, UPPER GI TRACT, ENDOSCOPIC;  Surgeon: Nhan Garza MD;  Location: [facility];  Service: Endoscopy;  Laterality: Left;    ESOPHAGOGASTRODUODENOSCOPY N/A 5/10/2023    Procedure: EGD (ESOPHAGOGASTRODUODENOSCOPY);  Surgeon: Nhan Garza MD;  Location: [facility];  Service: Endoscopy;  Laterality: N/A;    HERNIA REPAIR      ventral hernia    JOINT REPLACEMENT      Bilateral knees & Rt shoulder    PROSTATE SURGERY      greenlight laser    ROBOT-ASSISTED CHOLECYSTECTOMY USING DA NOLA XI N/A 11/15/2022    Procedure: XI ROBOTIC CHOLECYSTECTOMY;  Surgeon: Yung Pond MD;  Location: [facility];  Service: General;  Laterality: N/A;    SLEEVE GASTROPLASTY  2012    TONSILLECTOMY      TOTAL KNEE ARTHROPLASTY  left    TOTAL SHOULDER ARTHROPLASTY  right    TYMPANOPLASTY  left    VASECTOMY       Social History     Tobacco Use    Smoking status: Never    Smokeless tobacco: Never   Substance Use Topics    Alcohol use: No    Drug use: No          Review of Systems     Constitutional: Negative for chills, fatigue and fever.   Eyes: No double vision, No blurred vision  Neuro: No headaches, No dizziness  Respiratory: Negative for cough, shortness of breath with effort   Cardiovascular: Negative for chest pain. Negative for palpitations and leg swelling.   Gastrointestinal: Negative for abdominal pain, No melena, diarrhea, nausea and vomiting.   Genitourinary: Negative for dysuria and frequency, Negative for hematuria  Skin: Negative for bruising, Negative for edema or discoloration noted.            Objective        Vitals:    03/22/24 1134   BP: 124/80   Pulse: 68   Resp: 16       LIPIDS - LAST 2   Lab Results   Component Value Date    CHOL 163 07/22/2021    CHOL 164 08/26/2020    HDL 43 07/22/2021    HDL 41  08/26/2020    LDLCALC 92 07/22/2021    LDLCALC 94 08/26/2020    TRIG 181 (H) 07/22/2021    TRIG 191 08/26/2020    CHOLHDL 3.8 07/22/2021    CHOLHDL 24.5 06/06/2012       CBC - LAST 2  Lab Results   Component Value Date    WBC 5.12 08/01/2022    WBC 4.5 12/17/2021    RBC 4.27 (L) 08/01/2022    RBC 4.70 12/17/2021    HGB 13.8 (L) 11/15/2022    HGB 13.2 (L) 08/01/2022    HCT 39.5 (L) 08/01/2022    HCT 42.7 12/17/2021    MCV 93 08/01/2022    MCV 90.9 12/17/2021    MCH 30.9 08/01/2022    MCH 30.6 12/17/2021    MCHC 33.4 08/01/2022    MCHC 33.7 12/17/2021    RDW 12.2 08/01/2022    RDW 11.8 12/17/2021     08/01/2022     12/17/2021    MPV 10.0 08/01/2022    MPV 10.3 12/17/2021    GRAN 3.2 08/01/2022    GRAN 61.9 08/01/2022    LYMPH 1.5 08/01/2022    LYMPH 28.9 08/01/2022    MONO 0.3 08/01/2022    MONO 5.9 08/01/2022    BASO 0.02 08/01/2022    BASO 41 12/17/2021    NRBC 0 08/01/2022    NRBC 0 09/25/2019       CHEMISTRY & LIVER FUNCTION - LAST 2  Lab Results   Component Value Date     11/15/2022     08/01/2022    K 3.7 11/15/2022    K 3.5 08/01/2022     11/15/2022     08/01/2022    CO2 30 11/15/2022    CO2 25 08/01/2022    ANIONGAP 8 11/15/2022    ANIONGAP 12 08/01/2022    BUN 15 11/15/2022    BUN 17 08/01/2022    CREATININE 0.77 11/15/2022    CREATININE 1.1 08/01/2022     (H) 11/15/2022     (H) 08/01/2022    CALCIUM 8.8 11/15/2022    CALCIUM 9.1 08/01/2022    MG 2.2 11/18/2011    MG 1.8 08/17/2011    ALBUMIN 4.4 07/28/2023    ALBUMIN 4.0 08/01/2022    PROT 6.9 08/01/2022    PROT 7.0 12/17/2021    ALKPHOS 61 08/01/2022    ALKPHOS 60 09/10/2019    ALT 32 08/01/2022    ALT 35 12/17/2021    AST 20 08/01/2022    AST 24 12/17/2021    BILITOT 0.3 08/01/2022    BILITOT 0.6 12/17/2021        CARDIAC PROFILE - LAST 2  Lab Results   Component Value Date    BNP 51 12/24/2020        COAGULATION - LAST 2  Lab Results   Component Value Date    INR 1.05 01/31/2012    APTT 29.6 01/31/2012        ENDOCRINE & PSA - LAST 2  Lab Results   Component Value Date    HGBA1C 5.8 (H) 11/15/2022    HGBA1C 5.6 08/17/2011    TSH 2.55 07/22/2021    TSH 1.86 08/17/2011    PSA 0.32 01/08/2013        ECHOCARDIOGRAM RESULTS  Results for orders placed in visit on 06/29/23    Echo    Interpretation Summary  · The left ventricle is normal in size with normal systolic function.  · The estimated ejection fraction is 60%.  · Indeterminate left ventricular diastolic function.  · Mild right ventricular enlargement with low normal right ventricular systolic function.  · Intermediate central venous pressure (8 mmHg).  · The estimated PA systolic pressure is 41 mmHg.      CURRENT/PREVIOUS VISIT EKG  Results for orders placed or performed in visit on 06/13/23   IN OFFICE EKG 12-LEAD (to [facility])    Collection Time: 06/13/23  3:16 PM    Narrative    Test Reason : R06.02,    Vent. Rate : 064 BPM     Atrial Rate : 064 BPM     P-R Int : 174 ms          QRS Dur : 096 ms      QT Int : 416 ms       P-R-T Axes : 023 011 025 degrees     QTc Int : 429 ms    Normal sinus rhythm  Normal ECG  When compared with ECG of 23-AUG-2022 11:04,  No significant change was found  Confirmed by Augustus Najera MD ([id number]) on 8/21/2023 1:46:27 PM    Referred By:  ANUJA           Confirmed By:Augustus Najera MD     No valid procedures specified.   Results for orders placed during the hospital encounter of 06/22/23    Nuclear Stress - Cardiology Interpreted    Interpretation Summary    Normal myocardial perfusion scan. There is no evidence of myocardial ischemia or infarction.    The gated perfusion images showed an ejection fraction of 70% post stress.    There is normal wall motion post stress.    LV cavity size is normal at rest and normal at stress.    The ECG portion of the study is uninterpretable due to poor ECG quality.    The patient reported no chest pain during the stress test.    No valid procedures specified.          PREVIOUS STRESS  TEST              PREVIOUS ANGIOGRAM        PHYSICAL EXAM    GENERAL: well built, well nourished, well-developed in no apparent distress alert and oriented.   HEENT: Normocephalic. Pupils normal and conjunctivae normal.  Mucous membranes normal, no cyanosis or icterus, trachea central,no pallor or icterus is noted..   Uses corrective lens  NECK: No JVD. No bruit..   THYROID: Thyroid not enlarged. No nodules present..   CARDIAC: Regular rate and rhythm. S1 is normal.S2 is normal.No gallops, clicks or murmurs noted at this time.  CHEST ANATOMY: normal.   LUNGS: Clear to auscultation. No wheezing or rhonchi..   ABDOMEN:  Truncal obesity, Soft no masses or organomegaly.  No abdomen pulsations or bruits.  Normal bowel sounds. No pulsations and no masses felt, No guarding or rebound.   EXTREMITIES: No cyanosis, clubbing or edema noted at this time., no calf tenderness bilaterally.   PERIPHERAL VASCULAR SYSTEM: Good palpable distal pulses.   CENTRAL NERVOUS SYSTEM: No focal motor or sensory deficits noted.   SKIN: Skin without lesions, moist, well perfused.   MUSCLE STRENGTH & TONE: No noteable weakness, atrophy or abnormal movement.     I HAVE REVIEWED :    The vital signs, nurses notes, and all the pertinent radiology and labs.        Current Outpatient Medications   Medication Instructions    amlodipine-valsartan (EXFORGE) 5-320 mg per tablet TAKE 1 TABLET EVERY DAY    celecoxib (CELEBREX) 100 MG capsule TAKE 1 CAPSULE EVERY DAY    citalopram (CELEXA) 20 MG tablet TAKE 1 TABLET EVERY DAY    colestipoL (COLESTID) 2 g, Oral    finasteride (PROSCAR) 5 mg, Oral, Daily    fish oil-dha-epa 1,200-144-216 mg Cap 1 capsule, Oral, 2 times daily,      labetaloL (NORMODYNE) 200 MG tablet TAKE 1 TABLET TWICE DAILY    pantoprazole (PROTONIX) 40 MG tablet TAKE 1 TABLET EVERY DAY    potassium chloride SA (K-DUR,KLOR-CON) 20 MEQ tablet TAKE 1 TABLET TWICE DAILY    pravastatin (PRAVACHOL) 40 MG tablet TAKE 1 TABLET ONE TIME DAILY IN THE  MORNING    spironolactone (ALDACTONE) 50 MG tablet TAKE 1 TABLET ONE TIME DAILY    tamsulosin (FLOMAX) 0.8 mg, Oral, Daily, Take in evening.      03/22/2024 EKG shows sinus rhythm with isolated premature ventricular complexes poor R-wave progression to suggest anterior infarct pattern otherwise no acute ST changes noted.    Assessment & Plan     Coronary artery disease without angina pectoris  He was documented coronary artery disease by coronary calcium score are.  At this point because of progressive symptoms and recent stress test being negative he is concerned about progression of ischemic heart disease.  Recommend cardiac catheterization for more definite diagnosis.  At this point patient prefers to have this rather noninvasive testing    Discussed with patient the risks and benefits of the procedure including, but not limited to, the following 1:1000 risk of Heart attack, stroke and death   And 3-5% Risk of bleeding, vessel damage, even needing surgical repair and the need for emergent CABG Surgery.  If intervention is needed atrial risk of emergent bypass surgery, MI is 1-3%  All questions have been answered to patient's full satisfaction.  Informed consent obtained for both cardiac catheterization and possible PCI  Will advise the patient to have nothing by mouth post midnight and proceed with the coronary angiography possible PCI in the morning of the procedure.          Essential hypertension  Blood pressure is fairly well controlled at 124/80 mm Hg continue on amlodipine valsartan combination 5-320 mg once a day, maintain on low-salt diet continue on Aldactone 20 50 mg once daily as well.    Hyperlipidemia  He is presently on pravastatin at 40 mg a day  Is due to have his lipids evaluated again this was done with primary care physician elsewhere.  To his lipid levels for monitoring or optimizing his therapy.      SOB (shortness of breath)  Progressive symptoms of shortness of breath.  He is concerned  about progression of ischemic heart disease and coronary artery disease will in the presence of calcium score elevation and progressive decline in effort capacity is prudent to consider angiographic assessment more definite diagnosis as listed above.  Risks and benefits include risk of stroke heart attack and death and as major complications including arrhythmias as well as risk of bleeding arterial damage thrombosis needing surgical intervention at the vascular access sites as well.    Morbid (severe) obesity due to excess calories  Morbid obesity with a BMI of 38.17 kilograms/meter squared suspect this is contributing to a large component of his symptoms.  Regardless of coronary findings I have encouraged him to get on a strict diet low-fat low-cholesterol diet calorie restriction discussed with his primary care regarding optimize therapy for weight management as well.    Sleep apnea  Is not using his sleep assist device at this time          Follow up in about 4 weeks (around 4/19/2024).

## 2024-03-22 NOTE — H&P (VIEW-ONLY)
Subjective:    Patient ID:  Sammy Muniz is a 73 y.o. male patient here for evaluation Results (Abnormal calcium score, he is having some sob/Stress and echo done in June 2023) and Shortness of Breath      History of Present Illness:     Patient is a 73-year-old gentleman who has noticed increasing shortness of breath and a recent trip to [place] he was short of breath walking 100 yd apparently.  This has been getting progressively worse even putting trash can out to the curb he has dyspnea on exertion.  Denies having any rest pain no arm neck or jaw pain noted.  His primary care her ordered a calcium score and this has abnormal at a total score of 521.    The LAD score was 183, left circumflex artery 26, left main 4, right coronary artery 248, right PDA 0   He is also noted to have dilatation of his ascending aorta measuring 4.4 cm and the descending aorta measures 3.3 cm.  Patient has no significant wheezing at this time  In the past year or so he has gained about 20 lb over the course of time with predominantly truncal obesity.  No edema noted in the lower extremities no symptoms of PND or orthopnea noted.    His previous echocardiogram obtained in June she was normal LV function myocardial perfusion study did not show any evidence of ischemia.        Review of patient's allergies indicates:   Allergen Reactions    Iodinated contrast media Itching       Past Medical History:   Diagnosis Date    Anxiety and depression     Arthritis     b/l Knee and Shoulder replacements    Atherosclerosis of native coronary artery of native heart with angina pectoris 3/22/2024    Biliary colic 11/2022    BPH (benign prostatic hyperplasia)     Hyperlipidemia     Hypertension     Kidney stone     Sleep apnea     Doews not use C-pap     Past Surgical History:   Procedure Laterality Date    ARTHROPLASTY OF SHOULDER Left 09/24/2019    Procedure: ARTHROPLASTY, SHOULDER;  Surgeon: Abad Miller Jr., MD;  Location: [facility] OR;   Service: Orthopedics;  Laterality: Left;  Pratik-Ranjan Lopes notified 9/20-tcb    CERVICAL FUSION      CYSTOSCOPY      ENDOSCOPIC ULTRASOUND OF UPPER GASTROINTESTINAL TRACT Left 5/10/2023    Procedure: ULTRASOUND, UPPER GI TRACT, ENDOSCOPIC;  Surgeon: Nhan Garza MD;  Location: [facility];  Service: Endoscopy;  Laterality: Left;    ESOPHAGOGASTRODUODENOSCOPY N/A 5/10/2023    Procedure: EGD (ESOPHAGOGASTRODUODENOSCOPY);  Surgeon: Nhan Garza MD;  Location: [facility];  Service: Endoscopy;  Laterality: N/A;    HERNIA REPAIR      ventral hernia    JOINT REPLACEMENT      Bilateral knees & Rt shoulder    PROSTATE SURGERY      greenlight laser    ROBOT-ASSISTED CHOLECYSTECTOMY USING DA NOLA XI N/A 11/15/2022    Procedure: XI ROBOTIC CHOLECYSTECTOMY;  Surgeon: Yung Pond MD;  Location: [facility];  Service: General;  Laterality: N/A;    SLEEVE GASTROPLASTY  2012    TONSILLECTOMY      TOTAL KNEE ARTHROPLASTY  left    TOTAL SHOULDER ARTHROPLASTY  right    TYMPANOPLASTY  left    VASECTOMY       Social History     Tobacco Use    Smoking status: Never    Smokeless tobacco: Never   Substance Use Topics    Alcohol use: No    Drug use: No          Review of Systems     Constitutional: Negative for chills, fatigue and fever.   Eyes: No double vision, No blurred vision  Neuro: No headaches, No dizziness  Respiratory: Negative for cough, shortness of breath with effort   Cardiovascular: Negative for chest pain. Negative for palpitations and leg swelling.   Gastrointestinal: Negative for abdominal pain, No melena, diarrhea, nausea and vomiting.   Genitourinary: Negative for dysuria and frequency, Negative for hematuria  Skin: Negative for bruising, Negative for edema or discoloration noted.            Objective        Vitals:    03/22/24 1134   BP: 124/80   Pulse: 68   Resp: 16       LIPIDS - LAST 2   Lab Results   Component Value Date    CHOL 163 07/22/2021    CHOL 164 08/26/2020    HDL 43 07/22/2021    HDL 41  08/26/2020    LDLCALC 92 07/22/2021    LDLCALC 94 08/26/2020    TRIG 181 (H) 07/22/2021    TRIG 191 08/26/2020    CHOLHDL 3.8 07/22/2021    CHOLHDL 24.5 06/06/2012       CBC - LAST 2  Lab Results   Component Value Date    WBC 5.12 08/01/2022    WBC 4.5 12/17/2021    RBC 4.27 (L) 08/01/2022    RBC 4.70 12/17/2021    HGB 13.8 (L) 11/15/2022    HGB 13.2 (L) 08/01/2022    HCT 39.5 (L) 08/01/2022    HCT 42.7 12/17/2021    MCV 93 08/01/2022    MCV 90.9 12/17/2021    MCH 30.9 08/01/2022    MCH 30.6 12/17/2021    MCHC 33.4 08/01/2022    MCHC 33.7 12/17/2021    RDW 12.2 08/01/2022    RDW 11.8 12/17/2021     08/01/2022     12/17/2021    MPV 10.0 08/01/2022    MPV 10.3 12/17/2021    GRAN 3.2 08/01/2022    GRAN 61.9 08/01/2022    LYMPH 1.5 08/01/2022    LYMPH 28.9 08/01/2022    MONO 0.3 08/01/2022    MONO 5.9 08/01/2022    BASO 0.02 08/01/2022    BASO 41 12/17/2021    NRBC 0 08/01/2022    NRBC 0 09/25/2019       CHEMISTRY & LIVER FUNCTION - LAST 2  Lab Results   Component Value Date     11/15/2022     08/01/2022    K 3.7 11/15/2022    K 3.5 08/01/2022     11/15/2022     08/01/2022    CO2 30 11/15/2022    CO2 25 08/01/2022    ANIONGAP 8 11/15/2022    ANIONGAP 12 08/01/2022    BUN 15 11/15/2022    BUN 17 08/01/2022    CREATININE 0.77 11/15/2022    CREATININE 1.1 08/01/2022     (H) 11/15/2022     (H) 08/01/2022    CALCIUM 8.8 11/15/2022    CALCIUM 9.1 08/01/2022    MG 2.2 11/18/2011    MG 1.8 08/17/2011    ALBUMIN 4.4 07/28/2023    ALBUMIN 4.0 08/01/2022    PROT 6.9 08/01/2022    PROT 7.0 12/17/2021    ALKPHOS 61 08/01/2022    ALKPHOS 60 09/10/2019    ALT 32 08/01/2022    ALT 35 12/17/2021    AST 20 08/01/2022    AST 24 12/17/2021    BILITOT 0.3 08/01/2022    BILITOT 0.6 12/17/2021        CARDIAC PROFILE - LAST 2  Lab Results   Component Value Date    BNP 51 12/24/2020        COAGULATION - LAST 2  Lab Results   Component Value Date    INR 1.05 01/31/2012    APTT 29.6 01/31/2012        ENDOCRINE & PSA - LAST 2  Lab Results   Component Value Date    HGBA1C 5.8 (H) 11/15/2022    HGBA1C 5.6 08/17/2011    TSH 2.55 07/22/2021    TSH 1.86 08/17/2011    PSA 0.32 01/08/2013        ECHOCARDIOGRAM RESULTS  Results for orders placed in visit on 06/29/23    Echo    Interpretation Summary  · The left ventricle is normal in size with normal systolic function.  · The estimated ejection fraction is 60%.  · Indeterminate left ventricular diastolic function.  · Mild right ventricular enlargement with low normal right ventricular systolic function.  · Intermediate central venous pressure (8 mmHg).  · The estimated PA systolic pressure is 41 mmHg.      CURRENT/PREVIOUS VISIT EKG  Results for orders placed or performed in visit on 06/13/23   IN OFFICE EKG 12-LEAD (to [employer])    Collection Time: 06/13/23  3:16 PM    Narrative    Test Reason : R06.02,    Vent. Rate : 064 BPM     Atrial Rate : 064 BPM     P-R Int : 174 ms          QRS Dur : 096 ms      QT Int : 416 ms       P-R-T Axes : 023 011 025 degrees     QTc Int : 429 ms    Normal sinus rhythm  Normal ECG  When compared with ECG of 23-AUG-2022 11:04,  No significant change was found  Confirmed by Augustus Najera MD ([id number]) on 8/21/2023 1:46:27 PM    Referred By:  ANUJA           Confirmed By:Augustus Najera MD     No valid procedures specified.   Results for orders placed during the hospital encounter of 06/22/23    Nuclear Stress - Cardiology Interpreted    Interpretation Summary    Normal myocardial perfusion scan. There is no evidence of myocardial ischemia or infarction.    The gated perfusion images showed an ejection fraction of 70% post stress.    There is normal wall motion post stress.    LV cavity size is normal at rest and normal at stress.    The ECG portion of the study is uninterpretable due to poor ECG quality.    The patient reported no chest pain during the stress test.    No valid procedures specified.          PREVIOUS STRESS  TEST              PREVIOUS ANGIOGRAM        PHYSICAL EXAM    GENERAL: well built, well nourished, well-developed in no apparent distress alert and oriented.   HEENT: Normocephalic. Pupils normal and conjunctivae normal.  Mucous membranes normal, no cyanosis or icterus, trachea central,no pallor or icterus is noted..   Uses corrective lens  NECK: No JVD. No bruit..   THYROID: Thyroid not enlarged. No nodules present..   CARDIAC: Regular rate and rhythm. S1 is normal.S2 is normal.No gallops, clicks or murmurs noted at this time.  CHEST ANATOMY: normal.   LUNGS: Clear to auscultation. No wheezing or rhonchi..   ABDOMEN:  Truncal obesity, Soft no masses or organomegaly.  No abdomen pulsations or bruits.  Normal bowel sounds. No pulsations and no masses felt, No guarding or rebound.   EXTREMITIES: No cyanosis, clubbing or edema noted at this time., no calf tenderness bilaterally.   PERIPHERAL VASCULAR SYSTEM: Good palpable distal pulses.   CENTRAL NERVOUS SYSTEM: No focal motor or sensory deficits noted.   SKIN: Skin without lesions, moist, well perfused.   MUSCLE STRENGTH & TONE: No noteable weakness, atrophy or abnormal movement.     I HAVE REVIEWED :    The vital signs, nurses notes, and all the pertinent radiology and labs.        Current Outpatient Medications   Medication Instructions    amlodipine-valsartan (EXFORGE) 5-320 mg per tablet TAKE 1 TABLET EVERY DAY    celecoxib (CELEBREX) 100 MG capsule TAKE 1 CAPSULE EVERY DAY    citalopram (CELEXA) 20 MG tablet TAKE 1 TABLET EVERY DAY    colestipoL (COLESTID) 2 g, Oral    finasteride (PROSCAR) 5 mg, Oral, Daily    fish oil-dha-epa 1,200-144-216 mg Cap 1 capsule, Oral, 2 times daily,      labetaloL (NORMODYNE) 200 MG tablet TAKE 1 TABLET TWICE DAILY    pantoprazole (PROTONIX) 40 MG tablet TAKE 1 TABLET EVERY DAY    potassium chloride SA (K-DUR,KLOR-CON) 20 MEQ tablet TAKE 1 TABLET TWICE DAILY    pravastatin (PRAVACHOL) 40 MG tablet TAKE 1 TABLET ONE TIME DAILY IN THE  MORNING    spironolactone (ALDACTONE) 50 MG tablet TAKE 1 TABLET ONE TIME DAILY    tamsulosin (FLOMAX) 0.8 mg, Oral, Daily, Take in evening.      03/22/2024 EKG shows sinus rhythm with isolated premature ventricular complexes poor R-wave progression to suggest anterior infarct pattern otherwise no acute ST changes noted.    Assessment & Plan     Coronary artery disease without angina pectoris  He was documented coronary artery disease by coronary calcium score are.  At this point because of progressive symptoms and recent stress test being negative he is concerned about progression of ischemic heart disease.  Recommend cardiac catheterization for more definite diagnosis.  At this point patient prefers to have this rather noninvasive testing    Discussed with patient the risks and benefits of the procedure including, but not limited to, the following 1:1000 risk of Heart attack, stroke and death   And 3-5% Risk of bleeding, vessel damage, even needing surgical repair and the need for emergent CABG Surgery.  If intervention is needed atrial risk of emergent bypass surgery, MI is 1-3%  All questions have been answered to patient's full satisfaction.  Informed consent obtained for both cardiac catheterization and possible PCI  Will advise the patient to have nothing by mouth post midnight and proceed with the coronary angiography possible PCI in the morning of the procedure.          Essential hypertension  Blood pressure is fairly well controlled at 124/80 mm Hg continue on amlodipine valsartan combination 5-320 mg once a day, maintain on low-salt diet continue on Aldactone 20 50 mg once daily as well.    Hyperlipidemia  He is presently on pravastatin at 40 mg a day  Is due to have his lipids evaluated again this was done with primary care physician elsewhere.  To his lipid levels for monitoring or optimizing his therapy.      SOB (shortness of breath)  Progressive symptoms of shortness of breath.  He is concerned  about progression of ischemic heart disease and coronary artery disease will in the presence of calcium score elevation and progressive decline in effort capacity is prudent to consider angiographic assessment more definite diagnosis as listed above.  Risks and benefits include risk of stroke heart attack and death and as major complications including arrhythmias as well as risk of bleeding arterial damage thrombosis needing surgical intervention at the vascular access sites as well.    Morbid (severe) obesity due to excess calories  Morbid obesity with a BMI of 38.17 kilograms/meter squared suspect this is contributing to a large component of his symptoms.  Regardless of coronary findings I have encouraged him to get on a strict diet low-fat low-cholesterol diet calorie restriction discussed with his primary care regarding optimize therapy for weight management as well.    Sleep apnea  Is not using his sleep assist device at this time          Follow up in about 4 weeks (around 4/19/2024).

## 2024-03-22 NOTE — ASSESSMENT & PLAN NOTE
He is presently on pravastatin at 40 mg a day  Is due to have his lipids evaluated again this was done with primary care physician elsewhere.  To his lipid levels for monitoring or optimizing his therapy.

## 2024-03-22 NOTE — ASSESSMENT & PLAN NOTE
He was documented coronary artery disease by coronary calcium score are.  At this point because of progressive symptoms and recent stress test being negative he is concerned about progression of ischemic heart disease.  Recommend cardiac catheterization for more definite diagnosis.  At this point patient prefers to have this rather noninvasive testing    Discussed with patient the risks and benefits of the procedure including, but not limited to, the following 1:1000 risk of Heart attack, stroke and death   And 3-5% Risk of bleeding, vessel damage, even needing surgical repair and the need for emergent CABG Surgery.  If intervention is needed atrial risk of emergent bypass surgery, MI is 1-3%  All questions have been answered to patient's full satisfaction.  Informed consent obtained for both cardiac catheterization and possible PCI  Will advise the patient to have nothing by mouth post midnight and proceed with the coronary angiography possible PCI in the morning of the procedure.

## 2024-03-22 NOTE — ASSESSMENT & PLAN NOTE
Morbid obesity with a BMI of 38.17 kilograms/meter squared suspect this is contributing to a large component of his symptoms.  Regardless of coronary findings I have encouraged him to get on a strict diet low-fat low-cholesterol diet calorie restriction discussed with his primary care regarding optimize therapy for weight management as well.

## 2024-03-22 NOTE — ASSESSMENT & PLAN NOTE
Progressive symptoms of shortness of breath.  He is concerned about progression of ischemic heart disease and coronary artery disease will in the presence of calcium score elevation and progressive decline in effort capacity is prudent to consider angiographic assessment more definite diagnosis as listed above.  Risks and benefits include risk of stroke heart attack and death and as major complications including arrhythmias as well as risk of bleeding arterial damage thrombosis needing surgical intervention at the vascular access sites as well.

## 2024-03-22 NOTE — ASSESSMENT & PLAN NOTE
Blood pressure is fairly well controlled at 124/80 mm Hg continue on amlodipine valsartan combination 5-320 mg once a day, maintain on low-salt diet continue on Aldactone 20 50 mg once daily as well.

## 2024-03-25 ENCOUNTER — TELEPHONE (OUTPATIENT)
Dept: CARDIOLOGY | Facility: CLINIC | Age: 74
End: 2024-03-25
Payer: MEDICARE

## 2024-03-25 DIAGNOSIS — R06.02 SOB (SHORTNESS OF BREATH): Primary | ICD-10-CM

## 2024-03-25 NOTE — TELEPHONE ENCOUNTER
----- Message from Phyllis Dupont RN sent at 3/25/2024 10:25 AM CDT -----  Regarding: 3/22 EKG Order  The EKG performed on 3/22/24 is missing an order.  Please place an order so that the EKG can be read in [place].    Thank you,  Phyllis Dupont RN  [facility]   [facility]  3rd Floor Cardiology Clinic  [phone]/ [id number]

## 2024-04-02 ENCOUNTER — HOSPITAL ENCOUNTER (OUTPATIENT)
Dept: PREADMISSION TESTING | Facility: HOSPITAL | Age: 74
Discharge: HOME | End: 2024-04-02
Attending: INTERNAL MEDICINE
Payer: MEDICARE

## 2024-04-02 VITALS
HEART RATE: 75 BPM | WEIGHT: 261 LBS | BODY MASS INDEX: 37.37 KG/M2 | OXYGEN SATURATION: 94 % | SYSTOLIC BLOOD PRESSURE: 122 MMHG | DIASTOLIC BLOOD PRESSURE: 79 MMHG | RESPIRATION RATE: 18 BRPM | HEIGHT: 70 IN | TEMPERATURE: 98 F

## 2024-04-02 DIAGNOSIS — R06.02 SOB (SHORTNESS OF BREATH): ICD-10-CM

## 2024-04-02 DIAGNOSIS — I25.10 CORONARY ARTERY DISEASE WITHOUT ANGINA PECTORIS, UNSPECIFIED VESSEL OR LESION TYPE, UNSPECIFIED WHETHER NATIVE OR TRANSPLANTED HEART: ICD-10-CM

## 2024-04-02 DIAGNOSIS — I25.119 ATHEROSCLEROSIS OF NATIVE CORONARY ARTERY OF NATIVE HEART WITH ANGINA PECTORIS: ICD-10-CM

## 2024-04-02 LAB
ANION GAP SERPL CALC-SCNC: 6 MMOL/L (ref 8–16)
APTT PPP: 29.8 SEC (ref 21–32)
BASOPHILS # BLD AUTO: 0.02 K/UL (ref 0–0.2)
BASOPHILS NFR BLD: 0.4 % (ref 0–1.9)
BUN SERPL-MCNC: 22 MG/DL (ref 8–23)
CALCIUM SERPL-MCNC: 9.3 MG/DL (ref 8.7–10.5)
CHLORIDE SERPL-SCNC: 103 MMOL/L (ref 95–110)
CO2 SERPL-SCNC: 29 MMOL/L (ref 23–29)
CREAT SERPL-MCNC: 1 MG/DL (ref 0.5–1.4)
DIFFERENTIAL METHOD BLD: ABNORMAL
EOSINOPHIL # BLD AUTO: 0.1 K/UL (ref 0–0.5)
EOSINOPHIL NFR BLD: 1.8 % (ref 0–8)
ERYTHROCYTE [DISTWIDTH] IN BLOOD BY AUTOMATED COUNT: 12.3 % (ref 11.5–14.5)
EST. GFR  (NO RACE VARIABLE): >60 ML/MIN/1.73 M^2
GLUCOSE SERPL-MCNC: 125 MG/DL (ref 70–110)
HCT VFR BLD AUTO: 39.7 % (ref 40–54)
HGB BLD-MCNC: 13.3 G/DL (ref 14–18)
IMM GRANULOCYTES # BLD AUTO: 0.01 K/UL (ref 0–0.04)
IMM GRANULOCYTES NFR BLD AUTO: 0.2 % (ref 0–0.5)
LYMPHOCYTES # BLD AUTO: 1.5 K/UL (ref 1–4.8)
LYMPHOCYTES NFR BLD: 26.7 % (ref 18–48)
MCH RBC QN AUTO: 31.5 PG (ref 27–31)
MCHC RBC AUTO-ENTMCNC: 33.5 G/DL (ref 32–36)
MCV RBC AUTO: 94 FL (ref 82–98)
MONOCYTES # BLD AUTO: 0.3 K/UL (ref 0.3–1)
MONOCYTES NFR BLD: 5.5 % (ref 4–15)
NEUTROPHILS # BLD AUTO: 3.6 K/UL (ref 1.8–7.7)
NEUTROPHILS NFR BLD: 65.4 % (ref 38–73)
NRBC BLD-RTO: 0 /100 WBC
PLATELET # BLD AUTO: 234 K/UL (ref 150–450)
PMV BLD AUTO: 9.8 FL (ref 9.2–12.9)
POTASSIUM SERPL-SCNC: 4.1 MMOL/L (ref 3.5–5.1)
RBC # BLD AUTO: 4.22 M/UL (ref 4.6–6.2)
SODIUM SERPL-SCNC: 138 MMOL/L (ref 136–145)
WBC # BLD AUTO: 5.43 K/UL (ref 3.9–12.7)

## 2024-04-02 PROCEDURE — 85730 THROMBOPLASTIN TIME PARTIAL: CPT | Performed by: INTERNAL MEDICINE

## 2024-04-02 PROCEDURE — 36415 COLL VENOUS BLD VENIPUNCTURE: CPT | Performed by: INTERNAL MEDICINE

## 2024-04-02 PROCEDURE — 85025 COMPLETE CBC W/AUTO DIFF WBC: CPT | Performed by: INTERNAL MEDICINE

## 2024-04-02 PROCEDURE — 80048 BASIC METABOLIC PNL TOTAL CA: CPT | Performed by: INTERNAL MEDICINE

## 2024-04-02 RX ORDER — ORAL SEMAGLUTIDE 7 MG/1
1 TABLET ORAL DAILY
COMMUNITY
Start: 2024-02-22

## 2024-04-02 RX ORDER — CHOLECALCIFEROL (VITAMIN D3) 25 MCG
1000 TABLET ORAL DAILY
COMMUNITY

## 2024-04-02 RX ORDER — PREDNISONE 50 MG/1
50 TABLET ORAL 3 TIMES DAILY
Qty: 3 TABLET | Refills: 0 | Status: SHIPPED | OUTPATIENT
Start: 2024-04-02 | End: 2024-04-03

## 2024-04-02 NOTE — PRE ADMISSION SCREENING
Preadmit assessment complete. Review of patient health history and home medications. Questions answered. Patient/wife  voiced understanding. Chlorhexidine scrub given to patient for preop shower Preop education per AVS  ok to use recent EKG from 3/22/24.  Pt states very mild itch from iodine in the past, will inform heart center staff

## 2024-04-02 NOTE — DISCHARGE INSTRUCTIONS
Your procedure is scheduled for: Thursday 4/4/24          Arrive thru the [facility] entrance at: 0645am    Nothing to eat or drink after midnight the night before your procedure.  Do not take any medications the morning of your procedure  Bring all your medications with you in the original pill bottles from pharmacy.  If you take blood thinners, ask your doctor if you should stop taking them.  Do not take metformin 24 hours prior to your procedure.  Adjust your insulin or other diabetes medications if needed.   Do your chlorhexidine wash the night before and morning of your procedure.  If you use a CPAP or BiPAP at home, please bring it with you the day of your procedure.  Make arrangements for someone you know to drive you home after your procedure. Taxi and Uber are not acceptable.         Any questions call the [facility] at [phone]

## 2024-04-02 NOTE — NURSING
MD notified of patient allergy to iodine. PO prednisone ordered to pre-treat per MD order. Patient aware and has no questions at this time.

## 2024-04-03 RX ORDER — PREDNISONE 50 MG/1
50 TABLET ORAL 3 TIMES DAILY
Qty: 3 TABLET | Refills: 0 | Status: SHIPPED | OUTPATIENT
Start: 2024-04-03 | End: 2024-04-04

## 2024-04-04 ENCOUNTER — HOSPITAL ENCOUNTER (OUTPATIENT)
Facility: HOSPITAL | Age: 74
Discharge: HOME | End: 2024-04-04
Attending: INTERNAL MEDICINE | Admitting: INTERNAL MEDICINE
Payer: MEDICARE

## 2024-04-04 VITALS
OXYGEN SATURATION: 94 % | RESPIRATION RATE: 20 BRPM | SYSTOLIC BLOOD PRESSURE: 150 MMHG | HEART RATE: 104 BPM | DIASTOLIC BLOOD PRESSURE: 82 MMHG

## 2024-04-04 DIAGNOSIS — I25.10 CAD (CORONARY ARTERY DISEASE): ICD-10-CM

## 2024-04-04 DIAGNOSIS — I25.119 ATHEROSCLEROSIS OF NATIVE CORONARY ARTERY OF NATIVE HEART WITH ANGINA PECTORIS: ICD-10-CM

## 2024-04-04 DIAGNOSIS — R06.02 SOB (SHORTNESS OF BREATH): ICD-10-CM

## 2024-04-04 DIAGNOSIS — R07.89 OTHER CHEST PAIN: ICD-10-CM

## 2024-04-04 DIAGNOSIS — I25.10 CORONARY ARTERY DISEASE WITHOUT ANGINA PECTORIS, UNSPECIFIED VESSEL OR LESION TYPE, UNSPECIFIED WHETHER NATIVE OR TRANSPLANTED HEART: ICD-10-CM

## 2024-04-04 PROCEDURE — 99152 MOD SED SAME PHYS/QHP 5/>YRS: CPT | Performed by: INTERNAL MEDICINE

## 2024-04-04 PROCEDURE — 99152 MOD SED SAME PHYS/QHP 5/>YRS: CPT | Mod: ,,, | Performed by: INTERNAL MEDICINE

## 2024-04-04 PROCEDURE — 93458 L HRT ARTERY/VENTRICLE ANGIO: CPT | Performed by: INTERNAL MEDICINE

## 2024-04-04 PROCEDURE — 63600175 PHARM REV CODE 636 W HCPCS: Performed by: INTERNAL MEDICINE

## 2024-04-04 PROCEDURE — C1769 GUIDE WIRE: HCPCS | Performed by: INTERNAL MEDICINE

## 2024-04-04 PROCEDURE — C1894 INTRO/SHEATH, NON-LASER: HCPCS | Performed by: INTERNAL MEDICINE

## 2024-04-04 PROCEDURE — 25500020 PHARM REV CODE 255: Performed by: INTERNAL MEDICINE

## 2024-04-04 PROCEDURE — 25000003 PHARM REV CODE 250: Performed by: INTERNAL MEDICINE

## 2024-04-04 PROCEDURE — 25000003 PHARM REV CODE 250

## 2024-04-04 PROCEDURE — 93458 L HRT ARTERY/VENTRICLE ANGIO: CPT | Mod: 26,,, | Performed by: INTERNAL MEDICINE

## 2024-04-04 PROCEDURE — C1887 CATHETER, GUIDING: HCPCS | Performed by: INTERNAL MEDICINE

## 2024-04-04 RX ORDER — MIDAZOLAM HYDROCHLORIDE 1 MG/ML
INJECTION INTRAMUSCULAR; INTRAVENOUS
Status: DISCONTINUED | OUTPATIENT
Start: 2024-04-04 | End: 2024-04-04 | Stop reason: HOSPADM

## 2024-04-04 RX ORDER — DIPHENHYDRAMINE HCL 25 MG
50 CAPSULE ORAL
Status: DISCONTINUED | OUTPATIENT
Start: 2024-04-04 | End: 2024-04-04 | Stop reason: HOSPADM

## 2024-04-04 RX ORDER — DIPHENHYDRAMINE HCL 25 MG
CAPSULE ORAL
Status: COMPLETED
Start: 2024-04-04 | End: 2024-04-04

## 2024-04-04 RX ORDER — HEPARIN SODIUM 10000 [USP'U]/ML
INJECTION, SOLUTION INTRAVENOUS; SUBCUTANEOUS
Status: DISCONTINUED | OUTPATIENT
Start: 2024-04-04 | End: 2024-04-04 | Stop reason: HOSPADM

## 2024-04-04 RX ORDER — NITROGLYCERIN 5 MG/ML
INJECTION, SOLUTION INTRAVENOUS
Status: DISCONTINUED | OUTPATIENT
Start: 2024-04-04 | End: 2024-04-04 | Stop reason: HOSPADM

## 2024-04-04 RX ORDER — ACETAMINOPHEN 325 MG/1
650 TABLET ORAL EVERY 4 HOURS PRN
Status: DISCONTINUED | OUTPATIENT
Start: 2024-04-04 | End: 2024-04-04 | Stop reason: HOSPADM

## 2024-04-04 RX ORDER — FENTANYL CITRATE 50 UG/ML
INJECTION, SOLUTION INTRAMUSCULAR; INTRAVENOUS
Status: DISCONTINUED | OUTPATIENT
Start: 2024-04-04 | End: 2024-04-04 | Stop reason: HOSPADM

## 2024-04-04 RX ORDER — ONDANSETRON 4 MG/1
8 TABLET, ORALLY DISINTEGRATING ORAL EVERY 8 HOURS PRN
Status: DISCONTINUED | OUTPATIENT
Start: 2024-04-04 | End: 2024-04-04 | Stop reason: HOSPADM

## 2024-04-04 RX ORDER — LIDOCAINE HYDROCHLORIDE 10 MG/ML
INJECTION INFILTRATION; PERINEURAL
Status: DISCONTINUED | OUTPATIENT
Start: 2024-04-04 | End: 2024-04-04 | Stop reason: HOSPADM

## 2024-04-04 RX ORDER — SODIUM CHLORIDE 9 MG/ML
INJECTION, SOLUTION INTRAVENOUS ONCE
Status: COMPLETED | OUTPATIENT
Start: 2024-04-04 | End: 2024-04-04

## 2024-04-04 RX ORDER — SODIUM CHLORIDE 450 MG/100ML
INJECTION, SOLUTION INTRAVENOUS CONTINUOUS
Status: DISCONTINUED | OUTPATIENT
Start: 2024-04-04 | End: 2024-04-04 | Stop reason: HOSPADM

## 2024-04-04 RX ORDER — IODIXANOL 320 MG/ML
INJECTION, SOLUTION INTRAVASCULAR
Status: DISCONTINUED | OUTPATIENT
Start: 2024-04-04 | End: 2024-04-04 | Stop reason: HOSPADM

## 2024-04-04 RX ORDER — LABETALOL HYDROCHLORIDE 5 MG/ML
INJECTION, SOLUTION INTRAVENOUS
Status: DISCONTINUED | OUTPATIENT
Start: 2024-04-04 | End: 2024-04-04 | Stop reason: HOSPADM

## 2024-04-04 RX ADMIN — Medication 50 MG: at 06:04

## 2024-04-04 RX ADMIN — SODIUM CHLORIDE: 9 INJECTION, SOLUTION INTRAVENOUS at 06:04

## 2024-04-04 RX ADMIN — DIPHENHYDRAMINE HYDROCHLORIDE 50 MG: 25 CAPSULE ORAL at 06:04

## 2024-04-04 NOTE — Clinical Note
45 ml of contrast were injected throughout the case. 30 mL of contrast was the total wasted during the case. 75 mL was the total amount used during the case.

## 2024-04-07 ENCOUNTER — HOSPITAL ENCOUNTER (EMERGENCY)
Facility: HOSPITAL | Age: 74
Discharge: HOME | End: 2024-04-07
Attending: EMERGENCY MEDICINE
Payer: MEDICARE

## 2024-04-07 VITALS
BODY MASS INDEX: 38.51 KG/M2 | DIASTOLIC BLOOD PRESSURE: 75 MMHG | WEIGHT: 269 LBS | OXYGEN SATURATION: 94 % | HEART RATE: 74 BPM | RESPIRATION RATE: 17 BRPM | HEIGHT: 70 IN | SYSTOLIC BLOOD PRESSURE: 125 MMHG | TEMPERATURE: 98 F

## 2024-04-07 DIAGNOSIS — R21 RASH: Primary | ICD-10-CM

## 2024-04-07 LAB — GLUCOSE SERPL-MCNC: 108 MG/DL (ref 70–110)

## 2024-04-07 PROCEDURE — 96374 THER/PROPH/DIAG INJ IV PUSH: CPT

## 2024-04-07 PROCEDURE — 25000003 PHARM REV CODE 250: Performed by: EMERGENCY MEDICINE

## 2024-04-07 PROCEDURE — 96375 TX/PRO/DX INJ NEW DRUG ADDON: CPT

## 2024-04-07 PROCEDURE — 82962 GLUCOSE BLOOD TEST: CPT

## 2024-04-07 PROCEDURE — 99284 EMERGENCY DEPT VISIT MOD MDM: CPT

## 2024-04-07 PROCEDURE — 63600175 PHARM REV CODE 636 W HCPCS: Performed by: EMERGENCY MEDICINE

## 2024-04-07 RX ORDER — DIPHENHYDRAMINE HYDROCHLORIDE 50 MG/ML
25 INJECTION INTRAMUSCULAR; INTRAVENOUS
Status: COMPLETED | OUTPATIENT
Start: 2024-04-07 | End: 2024-04-07

## 2024-04-07 RX ORDER — METHYLPREDNISOLONE SOD SUCC 125 MG
125 VIAL (EA) INJECTION ONCE
Status: COMPLETED | OUTPATIENT
Start: 2024-04-07 | End: 2024-04-07

## 2024-04-07 RX ORDER — FAMOTIDINE 10 MG/ML
20 INJECTION INTRAVENOUS
Status: COMPLETED | OUTPATIENT
Start: 2024-04-07 | End: 2024-04-07

## 2024-04-07 RX ORDER — PREDNISONE 20 MG/1
40 TABLET ORAL DAILY
Qty: 8 TABLET | Refills: 0 | Status: SHIPPED | OUTPATIENT
Start: 2024-04-07 | End: 2024-04-11

## 2024-04-07 RX ADMIN — DIPHENHYDRAMINE HYDROCHLORIDE 25 MG: 50 INJECTION, SOLUTION INTRAMUSCULAR; INTRAVENOUS at 01:04

## 2024-04-07 RX ADMIN — METHYLPREDNISOLONE SODIUM SUCCINATE 125 MG: 125 INJECTION, POWDER, FOR SOLUTION INTRAMUSCULAR; INTRAVENOUS at 01:04

## 2024-04-07 RX ADMIN — FAMOTIDINE 20 MG: 10 INJECTION INTRAVENOUS at 01:04

## 2024-04-07 NOTE — Clinical Note
"    [place] [address]  Phone: [phone]  Fax: [phone]      Return to School    Sammy "Denisse Muniz was seen and treated in our emergency department on 4/7/2024.     COVID-19 is present in our communities across the state. There is limited testing for COVID at this time, so not all patients can be tested. In this situation, your employee meets the following criteria:    Sammy Muniz has met the criteria for COVID-19 testing and has a POSITIVE result. He can return to school once they are asymptomatic for 24 hours without the use of fever reducing medications AND at least five days from the first positive result. A mask is recommended for 5 days post quarantine.     If you have any questions or concerns, or if I can be of further assistance, please do not hesitate to contact me.    Sincerely,         "

## 2024-04-07 NOTE — DISCHARGE INSTRUCTIONS
Please read and follow discharge instructions and return precautions.  Take Benadryl 25 mg over-the-counter every 6 hours if needed for rash or itching.  Do not drive, drink alcohol or operate machinery while taking this medication as it may make you sleepy.  Prednisone as directed.  Pepcid 20 mg over-the-counter twice daily for the next 5 days.  Important to see your primary care provider tomorrow.  Rest, avoid any strenuous activity, over exertion or overheating--no hot showers either..  Keep well hydrated.  Return immediately if you develop new or worsening symptoms or if you have new problems or concerns.

## 2024-04-07 NOTE — Clinical Note
"    [place] [address]  Phone: [phone]  Fax: [phone]      Return to School    Sammy "Denisse Muniz was seen and treated in our emergency department on 4/7/2024.     COVID-19 is present in our communities across the state. There is limited testing for COVID at this time, so not all patients can be tested. In this situation, your employee meets the following criteria:    Sammy Muniz has met the criteria for COVID-19 testing and has a POSITIVE result. He can return to school once they are asymptomatic for 24 hours without the use of fever reducing medications AND at least five days from the first positive result. A mask is recommended for 5 days post quarantine.     If you have any questions or concerns, or if I can be of further assistance, please do not hesitate to contact me.    Sincerely,         "

## 2024-04-07 NOTE — Clinical Note
"    [place] [address]  Phone: [phone]  Fax: [phone]      Return to School    Sammy "Denisse Muniz was seen and treated in our emergency department on 4/7/2024.     COVID-19 is present in our communities across the state. There is limited testing for COVID at this time, so not all patients can be tested. In this situation, your employee meets the following criteria:    Sammy Muniz has met the criteria for COVID-19 testing and has a POSITIVE result. He can return to school once they are asymptomatic for 24 hours without the use of fever reducing medications AND at least five days from the first positive result. A mask is recommended for 5 days post quarantine.     If you have any questions or concerns, or if I can be of further assistance, please do not hesitate to contact me.    Sincerely,         "

## 2024-04-07 NOTE — ED NOTES
Pt states he had an angiogram Thursday and since has noted redness, itching and what appears to be a rash to his right flank, right chest, right neck and lower back.

## 2024-04-07 NOTE — ED NOTES
The patient is resting quietly, communicates with entry of staff. Airway is open and patent, respirations are spontaneous, normal respiratory effort and rate noted, skin warm and dry, appearance: in no acute distress and resting comfortably. Family at bedside.

## 2024-04-07 NOTE — Clinical Note
"    [place] [address]  Phone: [phone]  Fax: [phone]      Return to School    Sammy "Denisse Muniz was seen and treated in our emergency department on 4/7/2024.     COVID-19 is present in our communities across the state. There is limited testing for COVID at this time, so not all patients can be tested. In this situation, your employee meets the following criteria:    Sammy Muniz has met the criteria for COVID-19 testing and has a POSITIVE result. He can return to school once they are asymptomatic for 24 hours without the use of fever reducing medications AND at least five days from the first positive result. A mask is recommended for 5 days post quarantine.     If you have any questions or concerns, or if I can be of further assistance, please do not hesitate to contact me.    Sincerely,         "

## 2024-04-07 NOTE — ED NOTES
Adult Physical Assessment  LOC: Sammy Muniz, 73 y.o. male verified via two identifiers.  The patient is awake, alert, oriented and speaking appropriately at this time.  APPEARANCE: Patient resting comfortably and appears to be in no acute distress at this time. Patient is clean and well groomed, patient's clothing is properly fastened.  SKIN:The skin is warm and dry, patient has normal skin turgor and moist mucus membranes, redness with itching noted to pt back, right flank, and neck, pt states present since Thursday.   MUSCULOSKELETAL: Patient moving all extremities well, no obvious swelling or deformities noted.  RESPIRATORY: Airway is open and patent, respirations are spontaneous, patient has a normal effort and rate, no accessory muscle use noted.  CARDIAC: Patient has a normal rate, no periphreal edema noted in any extremity  ABDOMEN: Soft and non tender to palpation, no abdominal distention noted.  NEUROLOGIC: Eyes open spontaneously, behavior appropriate to situation, follows commands

## 2024-04-09 ENCOUNTER — TELEPHONE (OUTPATIENT)
Dept: CARDIOLOGY | Facility: CLINIC | Age: 74
End: 2024-04-09
Payer: MEDICARE

## 2024-04-09 NOTE — TELEPHONE ENCOUNTER
----- Message from Arabella Henderson sent at 4/9/2024 12:22 PM CDT -----  Contact: Self  Type:  Sooner Appointment Request    Caller is requesting a sooner appointment.  Caller declined first available appointment listed below.  Caller will not accept being placed on the waitlist and is requesting a message be sent to doctor.    Name of Caller:  Patient  When is the first available appointment?  N/A  Symptoms:  [facility] f/u & Angiogram f/u  Would the patient rather a call back or a response via [facility]? Call  Best Call Back Number:  [phone]  Additional Information:  Can we please call pt back to assist. Thank You

## 2024-04-15 NOTE — ED PROVIDER NOTES
Encounter Date: 4/7/2024       History     Chief Complaint   Patient presents with    Itching     Angiogram Thursday w/ itching and redness to back, neck, arms since. Pt used benadryl cream w/o relief     73-year-old male reports that he had an angiogram 3 days ago and has had some diffuse skin itching since then.  Denies urticaria, denies any oropharyngeal edema or swelling.  He is able to eat drink and swallow.  He denies chest pain wheezing or shortness of breath.  He has an iodine history however was pretreated for the procedure.  He denies any other problems or complaints.        Review of patient's allergies indicates:   Allergen Reactions    Iodinated contrast media Itching     Past Medical History:   Diagnosis Date    Anxiety and depression     Arthritis     b/l Knee and Shoulder replacements    Atherosclerosis of native coronary artery of native heart with angina pectoris 3/22/2024    Biliary colic 11/2022    BPH (benign prostatic hyperplasia)     Hyperlipidemia     Hypertension     Kidney stone     Sleep apnea     Doews not use C-pap     Past Surgical History:   Procedure Laterality Date    ARTHROPLASTY OF SHOULDER Left 09/24/2019    Procedure: ARTHROPLASTY, SHOULDER;  Surgeon: Abad Miller Jr., MD;  Location: [facility];  Service: Orthopedics;  Laterality: Left;  [employer] Lopes notified 9/20-tcb    CERVICAL FUSION      CYSTOSCOPY      ENDOSCOPIC ULTRASOUND OF UPPER GASTROINTESTINAL TRACT Left 5/10/2023    Procedure: ULTRASOUND, UPPER GI TRACT, ENDOSCOPIC;  Surgeon: Nhan Garza MD;  Location: [facility];  Service: Endoscopy;  Laterality: Left;    ESOPHAGOGASTRODUODENOSCOPY N/A 5/10/2023    Procedure: EGD (ESOPHAGOGASTRODUODENOSCOPY);  Surgeon: Nhan Garza MD;  Location: [facility];  Service: Endoscopy;  Laterality: N/A;    HERNIA REPAIR      ventral hernia    JOINT REPLACEMENT      Bilateral knees & Rt shoulder    LEFT HEART CATHETERIZATION Left 4/4/2024    Procedure: Left heart cath;   Surgeon: Clay Montana MD;  Location: [facility] CATH/EP LAB;  Service: Cardiology;  Laterality: Left;    PROSTATE SURGERY      greenlight laser    ROBOT-ASSISTED CHOLECYSTECTOMY USING DA NOLA XI N/A 11/15/2022    Procedure: XI ROBOTIC CHOLECYSTECTOMY;  Surgeon: Yung Pond MD;  Location: [facility] OR;  Service: General;  Laterality: N/A;    SLEEVE GASTROPLASTY  2012    TONSILLECTOMY      TOTAL KNEE ARTHROPLASTY  left    TOTAL SHOULDER ARTHROPLASTY  right    TYMPANOPLASTY  left    VASECTOMY       Family History   Problem Relation Name Age of Onset    Benign prostatic hyperplasia Brother      Heart disease Mother      Kidney disease Mother      Arthritis Mother      Mental illness Father  84        dementia    Diabetes Father      Hypertension Father      Heart disease Father      Cancer Father  75        colon    Rectal cancer Brother      Prostate cancer Neg Hx      Urolithiasis Neg Hx      Kidney cancer Neg Hx       Social History     Tobacco Use    Smoking status: Former     Types: Cigarettes    Smokeless tobacco: Never    Tobacco comments:     Smoked about 1 year    Substance Use Topics    Alcohol use: No    Drug use: No     Review of Systems   Constitutional: Negative.  Negative for activity change, appetite change, chills, diaphoresis, fatigue, fever and unexpected weight change.   HENT: Negative.  Negative for congestion, dental problem, ear pain, nosebleeds, rhinorrhea, sinus pain, sore throat, trouble swallowing and voice change.    Eyes: Negative.  Negative for pain and visual disturbance.   Respiratory: Negative.  Negative for cough, chest tightness, shortness of breath and wheezing.    Cardiovascular: Negative.  Negative for chest pain, palpitations and leg swelling.   Gastrointestinal: Negative.  Negative for abdominal pain, blood in stool, constipation, diarrhea, nausea and vomiting.   Endocrine: Negative.    Genitourinary: Negative.  Negative for difficulty urinating, dysuria, flank pain, frequency,  penile pain, testicular pain and urgency.   Musculoskeletal: Negative.  Negative for arthralgias, back pain, gait problem, joint swelling, myalgias, neck pain and neck stiffness.   Skin:  Positive for color change. Negative for pallor and rash.        Report diffuse  itching and redness at times, no specific rash   Neurological: Negative.  Negative for dizziness, seizures, syncope, facial asymmetry, speech difficulty, weakness, light-headedness, numbness and headaches.   Hematological: Negative.  Does not bruise/bleed easily.   Psychiatric/Behavioral: Negative.  Negative for confusion.    All other systems reviewed and are negative.      Physical Exam     Initial Vitals [04/07/24 1222]   BP Pulse Resp Temp SpO2   (!) 151/113 75 17 97.7 °F (36.5 °C) (!) 94 %      MAP       --         Physical Exam    Nursing note and vitals reviewed.  Constitutional: He is active and cooperative. He does not have a sickly appearance. He does not appear ill. No distress.   HENT:   Head: Normocephalic and atraumatic.   Nose: Nose normal. No mucosal edema, rhinorrhea or sinus tenderness.   Mouth/Throat: Uvula is midline, oropharynx is clear and moist and mucous membranes are normal. No oral lesions. No trismus in the jaw. No uvula swelling. No oropharyngeal exudate, posterior oropharyngeal edema, posterior oropharyngeal erythema or tonsillar abscesses.   No angioedema noted, airway is widely patent, voice is normal.   Eyes: Conjunctivae, EOM and lids are normal. Pupils are equal, round, and reactive to light. Right eye exhibits no discharge. Left eye exhibits no discharge. No scleral icterus.   Neck: Trachea normal and phonation normal. Neck supple. No stridor present. No tracheal deviation present. No JVD present.   Normal range of motion.   Full passive range of motion without pain.     Cardiovascular:  Normal rate, regular rhythm, normal heart sounds, intact distal pulses and normal pulses.     Exam reveals no gallop, no distant  heart sounds, no friction rub and no decreased pulses.       No murmur heard.  Pulmonary/Chest: Effort normal and breath sounds normal. No stridor. No respiratory distress. He has no decreased breath sounds. He has no wheezes. He has no rhonchi. He has no rales.   Abdominal: Abdomen is soft. Bowel sounds are normal. He exhibits no distension, no pulsatile midline mass and no mass. There is no abdominal tenderness. No hernia. There is no rebound and no guarding.   Musculoskeletal:         General: No tenderness or edema. Normal range of motion.      Right hand: Normal. Normal capillary refill. Normal pulse.      Left hand: Normal. Normal capillary refill. Normal pulse.      Cervical back: Normal, full passive range of motion without pain, normal range of motion and neck supple. No erythema, tenderness or bony tenderness. No pain with movement, spinous process tenderness or muscular tenderness. Normal range of motion and normal range of motion. Normal.      Thoracic back: Normal. No swelling, tenderness or bony tenderness. Normal range of motion.      Lumbar back: Normal. No swelling, tenderness or bony tenderness. Normal range of motion.      Right lower leg: No tenderness. No edema.      Left lower leg: No tenderness. No edema.      Right foot: Normal. Normal capillary refill. No swelling. Normal pulse.      Left foot: Normal. Normal capillary refill. No swelling. Normal pulse.      Comments: Pulses are 2+ throughout, cap refill is less than 2 sec throughout, no edema noted, extremities are nontender throughout with full range of motion     Lymphadenopathy:     He has no cervical adenopathy.   Neurological: He is alert and oriented to person, place, and time. He has normal strength. No cranial nerve deficit or sensory deficit. Coordination and gait normal.   No focal deficits   Skin: Skin is warm and dry. Capillary refill takes less than 2 seconds. No ecchymosis, no petechiae, no purpura and no rash noted. Rash is  not macular, not papular, not maculopapular, not pustular, not vesicular and not urticarial. There is erythema. No cyanosis. No pallor.   Mild erythema noted to the torso, no specific rash, no angioedema noted.   Psychiatric: He has a normal mood and affect. His speech is normal and behavior is normal. Cognition and memory are normal.         ED Course   Procedures  Labs Reviewed   POCT GLUCOSE          Imaging Results    None          Medications   diphenhydrAMINE injection 25 mg (25 mg Intravenous Given 24 1343)   famotidine (PF) injection 20 mg (20 mg Intravenous Given 24 1343)   methylPREDNISolone sodium succinate injection 125 mg (125 mg Intravenous Given 24 1343)     Medical Decision Making  Patient reports his symptoms have improved after ED treatment.  He has no evidence to suggest anaphylaxis.  Patient will be treated with antihistamines as well as prednisone.  Patient has not been discharged with an EpiPen as per findings of coronary artery disease on angiogram which could put him at risk for adverse effect if EpiPen used when not absolutely indicated and as he has not demonstrated evidence of acute anaphylaxis I advise immediate presentation to the emergency room by calling EMS in case of any allergic type symptoms which have been explained in detail.    Risk  Prescription drug management.                                      Clinical Impression:  Final diagnoses:  [R21] Rash (Primary)          ED Disposition Condition    Discharge Stable          ED Prescriptions       Medication Sig Dispense Start Date End Date Auth. Provider    predniSONE (DELTASONE) 20 MG tablet () Take 2 tablets (40 mg total) by mouth once daily. for 4 days 8 tablet 2024 Delia Tillman MD          Follow-up Information    None          Delia Tillman MD  [id number]

## 2024-04-18 ENCOUNTER — TELEPHONE (OUTPATIENT)
Dept: CARDIOLOGY | Facility: CLINIC | Age: 74
End: 2024-04-18
Payer: MEDICARE

## 2024-04-18 NOTE — TELEPHONE ENCOUNTER
----- Message from David Hobson, Patient Care Assistant sent at 4/18/2024  9:31 AM CDT -----  Contact: Pt  Type: Return Call    Who Called: Pt  Who Left Message for Pt: Emma  Does the patient know what this is regarding: Yes/Cardiac Clearance  Best Call Back Number: [phone]  Thank you~

## 2024-04-18 NOTE — LETTER
2024    Sammy Muniz  [address] [address] [address]             [facility] [place] [facility]  [address]  [facility] [facility] [address]  Phone: [phone]  Fax: [phone] Patient: Sammy Muniz  : 1950  Referring Doctor: DR. HOLDER  PROCEDURE: INTERVENTIONAL PAIN PROCEDURES      Current Outpatient Medications   Medication Sig Dispense Refill    amlodipine-valsartan (EXFORGE) 5-320 mg per tablet TAKE 1 TABLET EVERY DAY 90 tablet 3    citalopram (CELEXA) 20 MG tablet TAKE 1 TABLET EVERY DAY 90 tablet 1    colestipoL (COLESTID) 1 gram Tab Take 2 g by mouth.      fish oil-dha-epa 1,200-144-216 mg Cap Take 1 capsule by mouth once daily.      labetaloL (NORMODYNE) 200 MG tablet TAKE 1 TABLET TWICE DAILY 180 tablet 0    multivit-mins no.63/iron/folic (M-VIT ORAL) Take 1 tablet by mouth once daily.      pantoprazole (PROTONIX) 40 MG tablet TAKE 1 TABLET EVERY DAY 90 tablet 0    potassium chloride SA (K-DUR,KLOR-CON) 20 MEQ tablet TAKE 1 TABLET TWICE DAILY (Patient taking differently: 40 mEq once daily.) 180 tablet 3    pravastatin (PRAVACHOL) 40 MG tablet TAKE 1 TABLET ONE TIME DAILY IN THE MORNING 90 tablet 3    RYBELSUS 7 mg tablet Take 1 tablet by mouth once daily.      spironolactone (ALDACTONE) 50 MG tablet TAKE 1 TABLET ONE TIME DAILY 90 tablet 3    tamsulosin (FLOMAX) 0.4 mg Cap Take 2 capsules (0.8 mg total) by mouth once daily. Take in evening. (Patient taking differently: Take 0.8 mg by mouth nightly. Take in evening.) 180 capsule 3    vitamin D (VITAMIN D3) 1000 units Tab Take 1,000 Units by mouth once daily.       No current facility-administered medications for this visit.       This patient has been assessed for risk factors for clearance of surgery with the following stipulations:    ___ No contraindications  ___ Recommendations for antiplatelet/anticoagulant medications: NONE  ___ LOW RISK __ MODERATE RISK __  HIGH RISK    If you have any questions regarding the above, please contact my office at [phone].    Sincerely,

## 2024-05-01 ENCOUNTER — TELEPHONE (OUTPATIENT)
Dept: CARDIOLOGY | Facility: CLINIC | Age: 74
End: 2024-05-01
Payer: MEDICARE

## 2024-05-01 NOTE — LETTER
May 1, 2024    Sammy Muniz  [address] [address] [address]             [facility] [place] [facility] [place]  [address]  [facility] [address]  Phone: [phone]  Fax: [phone] Patient: Sammy Muniz  : 1950  Referring Doctor: DR. HOLDER  PROCEDURE: THORACIC CATA T10/T11    Current Outpatient Medications   Medication Sig Dispense Refill    amlodipine-valsartan (EXFORGE) 5-320 mg per tablet TAKE 1 TABLET EVERY DAY 90 tablet 3    citalopram (CELEXA) 20 MG tablet TAKE 1 TABLET EVERY DAY 90 tablet 1    colestipoL (COLESTID) 1 gram Tab Take 2 g by mouth.      fish oil-dha-epa 1,200-144-216 mg Cap Take 1 capsule by mouth once daily.      labetaloL (NORMODYNE) 200 MG tablet TAKE 1 TABLET TWICE DAILY 180 tablet 0    multivit-mins no.63/iron/folic (M-VIT ORAL) Take 1 tablet by mouth once daily.      pantoprazole (PROTONIX) 40 MG tablet TAKE 1 TABLET EVERY DAY 90 tablet 0    potassium chloride SA (K-DUR,KLOR-CON) 20 MEQ tablet TAKE 1 TABLET TWICE DAILY (Patient taking differently: 40 mEq once daily.) 180 tablet 3    pravastatin (PRAVACHOL) 40 MG tablet TAKE 1 TABLET ONE TIME DAILY IN THE MORNING 90 tablet 3    RYBELSUS 7 mg tablet Take 1 tablet by mouth once daily.      spironolactone (ALDACTONE) 50 MG tablet TAKE 1 TABLET ONE TIME DAILY 90 tablet 3    tamsulosin (FLOMAX) 0.4 mg Cap Take 2 capsules (0.8 mg total) by mouth once daily. Take in evening. (Patient taking differently: Take 0.8 mg by mouth nightly. Take in evening.) 180 capsule 3    vitamin D (VITAMIN D3) 1000 units Tab Take 1,000 Units by mouth once daily.       No current facility-administered medications for this visit.       This patient has been assessed for risk factors for clearance of surgery with the following stipulations:    ___ No contraindications  ___ Recommendations for antiplatelet/anticoagulant medications: NONE  ___ LOW RISK __ MODERATE RISK __ HIGH RISK        If you have any questions regarding the above, please contact my office at [phone].    Sincerely,

## 2024-05-21 LAB
OHS QRS DURATION: 100 MS
OHS QTC CALCULATION: 456 MS

## 2024-08-27 DIAGNOSIS — I25.119 ATHEROSCLEROSIS OF NATIVE CORONARY ARTERY OF NATIVE HEART WITH ANGINA PECTORIS: ICD-10-CM

## 2024-08-27 DIAGNOSIS — I10 ESSENTIAL HYPERTENSION: Primary | ICD-10-CM

## 2024-08-27 NOTE — TELEPHONE ENCOUNTER
Patient was last seen in the office on 3/22/24 w/ V Hermes . Pt is requesting a refill for EXFORGE 5-325 MG request will be sent to provider for approval

## 2024-09-03 RX ORDER — AMLODIPINE AND VALSARTAN 5; 320 MG/1; MG/1
TABLET ORAL
Qty: 90 TABLET | Refills: 3 | Status: SHIPPED | OUTPATIENT
Start: 2024-09-03

## 2024-10-05 ENCOUNTER — PATIENT OUTREACH (OUTPATIENT)
Dept: ADMINISTRATIVE | Facility: HOSPITAL | Age: 74
End: 2024-10-05
Payer: MEDICARE

## 2025-05-05 ENCOUNTER — HOSPITAL ENCOUNTER (OUTPATIENT)
Dept: RADIOLOGY | Facility: HOSPITAL | Age: 75
Discharge: HOME | End: 2025-05-05
Attending: NURSE PRACTITIONER
Payer: MEDICARE

## 2025-05-05 DIAGNOSIS — N20.0 KIDNEY STONE: ICD-10-CM

## 2025-05-05 DIAGNOSIS — R10.9 AP (ABDOMINAL PAIN): ICD-10-CM

## 2025-05-05 PROCEDURE — 74019 RADEX ABDOMEN 2 VIEWS: CPT | Mod: 26,,, | Performed by: RADIOLOGY

## 2025-05-05 PROCEDURE — 74019 RADEX ABDOMEN 2 VIEWS: CPT | Mod: TC,PN

## (undated) DEVICE — SCRUB HIBICLENS 4% CHG 4OZ

## (undated) DEVICE — SOL IRR NACL .9% 3000ML

## (undated) DEVICE — SLING SHLDR IMMOBILIZER LG

## (undated) DEVICE — SEE MEDLINE ITEM 157216

## (undated) DEVICE — SUT VICRYL 1 MO-4 18 CR/8

## (undated) DEVICE — SUT #2 TI-CRON HGS-21 30IN

## (undated) DEVICE — DRAPE INCISE IOBAN 2 23X17IN

## (undated) DEVICE — UNDERGLOVES BIOGEL PI SZ 7 LF

## (undated) DEVICE — GLOVE SURG ULTRA TOUCH 7

## (undated) DEVICE — MIXER BONE CEMENT

## (undated) DEVICE — EVACUATOR WOUND BULB 100CC

## (undated) DEVICE — DRAIN SINGLE ROUND 3/16 15F

## (undated) DEVICE — SEE MEDLINE ITEM 152622

## (undated) DEVICE — SPONGE SUPER KERLIX 6X6.75IN

## (undated) DEVICE — WIRE GUIDE WHOLEY MOD J .035

## (undated) DEVICE — DRESSING N ADH OIL EMUL 3X8 3S

## (undated) DEVICE — Device

## (undated) DEVICE — TAPE MEDIPORE 4IN X 2YDS

## (undated) DEVICE — INTERPULSE SET

## (undated) DEVICE — TAPE MEDIPORE 3 X 10YD

## (undated) DEVICE — PACK BASIC

## (undated) DEVICE — SUT MONOCRYL 3-0 PS-1

## (undated) DEVICE — CATH DXTERITY JR40 100CM 5FR

## (undated) DEVICE — UNDERGLOVES BIOGEL PI SIZE 8

## (undated) DEVICE — GUIDEWIRE INQWIRE SE 3MM JTIP

## (undated) DEVICE — GLOVE SURG ULTRA TOUCH 8

## (undated) DEVICE — GLOVE SURG ULTRA TOUCH 7.5

## (undated) DEVICE — SLING SHLDR IMMOBILIZER MED

## (undated) DEVICE — DRAPE STERI INSTRUMENT 1018

## (undated) DEVICE — SEE MEDLINE ITEM 152530

## (undated) DEVICE — STRAP OR TABLE 5IN X 72IN

## (undated) DEVICE — POSITIONER IV ARMBOARD FOAM

## (undated) DEVICE — CATH DXTERITY JL40 100CM 5FR

## (undated) DEVICE — SEE MEDLINE ITEM 157166

## (undated) DEVICE — TRAY DRY SPONGE SCRUB W FOAM

## (undated) DEVICE — CLOSURE SKIN STERI STRIP 1/2X4

## (undated) DEVICE — CUBE COLD THERAPY POLAR CARE

## (undated) DEVICE — PAD ABD 8X10 STERILE

## (undated) DEVICE — BLADE SAGITTAL 25MMX75MMX89MM

## (undated) DEVICE — STAPLER SKIN ROTATING HEAD

## (undated) DEVICE — SYR 50ML CATH TIP

## (undated) DEVICE — SUT VCRL 2-0 GYN 8-18IN MO4

## (undated) DEVICE — HEMOSTAT VASC BAND LONG 27CM

## (undated) DEVICE — DRAPE STERI U-SHAPED 47X51IN

## (undated) DEVICE — KIT GLIDESHEATH SLEND 6FR 10CM

## (undated) DEVICE — GOWN X-LG STERILE BACK

## (undated) DEVICE — MANIFOLD 4 PORT

## (undated) DEVICE — UNDERGLOVE BIOGEL PI SZ 6.5 LF

## (undated) DEVICE — PACK CUSTOM UNIV BASIN SLI

## (undated) DEVICE — ALCOHOL 70% ISOP RUBBING 4OZ

## (undated) DEVICE — TUBE SUCTION YANKAUER HI CAP

## (undated) DEVICE — SEE MEDLINE ITEM 157131

## (undated) DEVICE — PIN STEINMANN COMP 3.2MMX9
Type: IMPLANTABLE DEVICE | Site: SHOULDER | Status: NON-FUNCTIONAL
Removed: 2019-09-24

## (undated) DEVICE — ELECTRODE REM PLYHSV RETURN 9

## (undated) DEVICE — SEE MEDLINE ITEM 107746